# Patient Record
Sex: FEMALE | URBAN - METROPOLITAN AREA
[De-identification: names, ages, dates, MRNs, and addresses within clinical notes are randomized per-mention and may not be internally consistent; named-entity substitution may affect disease eponyms.]

---

## 2023-07-26 ENCOUNTER — HOSPITAL ENCOUNTER (OUTPATIENT)
Age: 78
Setting detail: SPECIMEN
Discharge: HOME | End: 2023-07-26

## 2023-07-26 LAB
ALBUMIN SERPL-MCNC: 4.1 G/DL (ref 3.5–5.2)
ALBUMIN/GLOB SERPL: 1.8 {RATIO} (ref 1–2.5)
ALP SERPL-CCNC: 55 U/L (ref 35–104)
ALT SERPL-CCNC: 15 U/L (ref 5–33)
ANION GAP SERPL CALCULATED.3IONS-SCNC: 14 MMOL/L (ref 9–17)
AST SERPL-CCNC: 17 U/L
BILIRUB SERPL-MCNC: 0.5 MG/DL (ref 0.3–1.2)
BUN SERPL-MCNC: 12 MG/DL (ref 8–23)
CALCIUM SERPL-MCNC: 9.7 MG/DL (ref 8.6–10.4)
CHLORIDE SERPL-SCNC: 104 MMOL/L (ref 98–107)
CO2 SERPL-SCNC: 24 MMOL/L (ref 20–31)
CREAT SERPL-MCNC: 0.5 MG/DL (ref 0.5–0.9)
EST. AVERAGE GLUCOSE BLD GHB EST-MCNC: 123 MG/DL
GFR SERPL CREATININE-BSD FRML MDRD: >60 ML/MIN/1.73M2
GLUCOSE SERPL-MCNC: 116 MG/DL (ref 70–99)
HBA1C MFR BLD: 5.9 % (ref 4–6)
POTASSIUM SERPL-SCNC: 4.2 MMOL/L (ref 3.7–5.3)
PROT SERPL-MCNC: 6.4 G/DL (ref 6.4–8.3)
SODIUM SERPL-SCNC: 142 MMOL/L (ref 135–144)
T4 FREE SERPL-MCNC: 1.3 NG/DL (ref 0.9–1.7)
TSH SERPL DL<=0.05 MIU/L-ACNC: 1.62 UIU/ML (ref 0.3–5)
VIT B12 SERPL-MCNC: 300 PG/ML (ref 232–1245)

## 2023-07-27 ENCOUNTER — HOSPITAL ENCOUNTER (OUTPATIENT)
Age: 78
Setting detail: SPECIMEN
Discharge: HOME | End: 2023-07-27

## 2023-07-27 LAB
CREAT UR-MCNC: 184 MG/DL (ref 28–217)
MICROALBUMIN UR-MCNC: 14 MG/L
MICROALBUMIN/CREAT UR-RTO: 8 MCG/MG CREAT

## 2023-09-05 RX ORDER — TRAZODONE HYDROCHLORIDE 100 MG/1
100 TABLET ORAL NIGHTLY
Qty: 30 TABLET | Refills: 5 | Status: SHIPPED | OUTPATIENT
Start: 2023-09-05 | End: 2024-02-02

## 2023-09-05 RX ORDER — TRAZODONE HYDROCHLORIDE 100 MG/1
TABLET ORAL
Qty: 90 TABLET | Refills: 1 | OUTPATIENT
Start: 2023-09-05

## 2023-09-05 RX ORDER — TRAZODONE HYDROCHLORIDE 100 MG/1
100 TABLET ORAL NIGHTLY
COMMUNITY
Start: 2020-09-28 | End: 2023-09-05 | Stop reason: SDUPTHER

## 2023-09-05 NOTE — TELEPHONE ENCOUNTER
Karlie Du is calling to request a refill on the following medication(s):    Medication Request:  Requested Prescriptions     Pending Prescriptions Disp Refills    traZODone (DESYREL) 100 MG tablet       Sig: Take 1 tablet by mouth nightly       Last Visit Date (If Applicable):  Visit date not found    Next Visit Date:    12/6/2023

## 2023-10-03 ENCOUNTER — TELEPHONE (OUTPATIENT)
Age: 78
End: 2023-10-03

## 2023-10-03 RX ORDER — FLUCONAZOLE 150 MG/1
TABLET ORAL
Qty: 3 TABLET | Refills: 2 | Status: SHIPPED | OUTPATIENT
Start: 2023-10-03

## 2023-11-30 ENCOUNTER — HOSPITAL ENCOUNTER (OUTPATIENT)
Age: 78
Setting detail: SPECIMEN
Discharge: HOME | End: 2023-11-30

## 2023-11-30 LAB
ALBUMIN SERPL-MCNC: 4 G/DL (ref 3.5–5.2)
ALBUMIN/GLOB SERPL: 1 {RATIO} (ref 1–2.5)
ALP SERPL-CCNC: 85 U/L (ref 35–104)
ALT SERPL-CCNC: 16 U/L (ref 10–35)
ANION GAP SERPL CALCULATED.3IONS-SCNC: 10 MMOL/L (ref 9–16)
AST SERPL-CCNC: 22 U/L (ref 10–35)
BASOPHILS # BLD: 0.09 K/UL (ref 0–0.2)
BASOPHILS NFR BLD: 1 % (ref 0–2)
BILIRUB SERPL-MCNC: 0.5 MG/DL (ref 0–1.2)
BUN SERPL-MCNC: 14 MG/DL (ref 8–23)
CALCIUM SERPL-MCNC: 9.4 MG/DL (ref 8.6–10.4)
CHLORIDE SERPL-SCNC: 104 MMOL/L (ref 98–107)
CHOLEST SERPL-MCNC: 166 MG/DL (ref 0–199)
CHOLESTEROL/HDL RATIO: 4
CO2 SERPL-SCNC: 28 MMOL/L (ref 20–31)
CREAT SERPL-MCNC: 0.7 MG/DL (ref 0.5–0.9)
EOSINOPHIL # BLD: 0.18 K/UL (ref 0–0.44)
EOSINOPHILS RELATIVE PERCENT: 3 % (ref 1–4)
ERYTHROCYTE [DISTWIDTH] IN BLOOD BY AUTOMATED COUNT: 14.3 % (ref 11.8–14.4)
EST. AVERAGE GLUCOSE BLD GHB EST-MCNC: 148 MG/DL
GFR SERPL CREATININE-BSD FRML MDRD: >60 ML/MIN/1.73M2
GLUCOSE SERPL-MCNC: 144 MG/DL (ref 74–99)
HBA1C MFR BLD: 6.8 % (ref 4–6)
HCT VFR BLD AUTO: 46.2 % (ref 36.3–47.1)
HDLC SERPL-MCNC: 47 MG/DL
HGB BLD-MCNC: 13.8 G/DL (ref 11.9–15.1)
IMM GRANULOCYTES # BLD AUTO: <0.03 K/UL (ref 0–0.3)
IMM GRANULOCYTES NFR BLD: 0 %
LDLC SERPL CALC-MCNC: 76 MG/DL (ref 0–100)
LYMPHOCYTES NFR BLD: 1.95 K/UL (ref 1.1–3.7)
LYMPHOCYTES RELATIVE PERCENT: 30 % (ref 24–43)
MCH RBC QN AUTO: 27.2 PG (ref 25.2–33.5)
MCHC RBC AUTO-ENTMCNC: 29.9 G/DL (ref 28.4–34.8)
MCV RBC AUTO: 90.9 FL (ref 82.6–102.9)
MONOCYTES NFR BLD: 0.56 K/UL (ref 0.1–1.2)
MONOCYTES NFR BLD: 9 % (ref 3–12)
NEUTROPHILS NFR BLD: 57 % (ref 36–65)
NEUTS SEG NFR BLD: 3.62 K/UL (ref 1.5–8.1)
NRBC BLD-RTO: 0 PER 100 WBC
PLATELET # BLD AUTO: 254 K/UL (ref 138–453)
PMV BLD AUTO: 10 FL (ref 8.1–13.5)
POTASSIUM SERPL-SCNC: 3.9 MMOL/L (ref 3.7–5.3)
PROT SERPL-MCNC: 6.7 G/DL (ref 6.6–8.7)
RBC # BLD AUTO: 5.08 M/UL (ref 3.95–5.11)
SODIUM SERPL-SCNC: 142 MMOL/L (ref 136–145)
TRIGL SERPL-MCNC: 216 MG/DL (ref 0–149)
VLDLC SERPL CALC-MCNC: 43 MG/DL
WBC OTHER # BLD: 6.4 K/UL (ref 3.5–11.3)

## 2023-11-30 NOTE — PROGRESS NOTES
[address] [address]     Date of Visit:  2023  Patient Name: Austin Beltran   Patient :  1945     CHIEF COMPLAINT/HPI:     Chief Complaint   Patient presents with    Check-Up    Discuss Medications        HPI      Austin Beltran, 69 y.o. presents today for 4 month follow-up of hypertension, hyperlipidemia, diabetes, hypothyroidism, and depression. She reports itching and raw feeling of vulva since starting Jardiance, so she has been taking Diflucan 150 mg. Her COPD has been well controlled with Breztri. She continues to experience shortness of breath when walking long distances, but she is no longer experiencing wheezing when she lays down in bed at night. She also reports mucus production in throat has resolved. She notes 3 month supply of Breztri is $500. She has been taking Omeprazole OTC with good control of acid reflux. She denies break through symptoms on medication. She requested prescription. She continues to experience knee pain, however she states it's not as bad as before. She reports experiencing back pain if she over does it such as running the sweeper. Pain improves with sitting down. She takes Meloxicam as needed for pain. She denies sinus headaches or pressure. She reports experiencing rhinorrhea and had been constantly sneezing and blowing her nose a couple weeks ago    She denies headaches, dizziness, syncope, chest pain, shortness of breath, nausea, vomiting, diarrhea, constipation, blood in her stool, lower extremity edema, vision changes, fever, or chills. She denies numbness and tingling in her feet. She denies family history of thyroid disease. She reports gaining weight easily and mentions gaining 5 lbs over . She follows with Dr. Kathi Arechiga for dermatology and recently had procedure on left lower extremity.      She is doing ok with grief, however she admits this time

## 2023-12-04 SDOH — ECONOMIC STABILITY: TRANSPORTATION INSECURITY
IN THE PAST 12 MONTHS, HAS LACK OF TRANSPORTATION KEPT YOU FROM MEETINGS, WORK, OR FROM GETTING THINGS NEEDED FOR DAILY LIVING?: NO

## 2023-12-04 SDOH — ECONOMIC STABILITY: INCOME INSECURITY: HOW HARD IS IT FOR YOU TO PAY FOR THE VERY BASICS LIKE FOOD, HOUSING, MEDICAL CARE, AND HEATING?: NOT HARD AT ALL

## 2023-12-04 SDOH — ECONOMIC STABILITY: HOUSING INSECURITY
IN THE LAST 12 MONTHS, WAS THERE A TIME WHEN YOU DID NOT HAVE A STEADY PLACE TO SLEEP OR SLEPT IN A SHELTER (INCLUDING NOW)?: NO

## 2023-12-04 SDOH — ECONOMIC STABILITY: FOOD INSECURITY: WITHIN THE PAST 12 MONTHS, THE FOOD YOU BOUGHT JUST DIDN'T LAST AND YOU DIDN'T HAVE MONEY TO GET MORE.: NEVER TRUE

## 2023-12-04 SDOH — ECONOMIC STABILITY: FOOD INSECURITY: WITHIN THE PAST 12 MONTHS, YOU WORRIED THAT YOUR FOOD WOULD RUN OUT BEFORE YOU GOT MONEY TO BUY MORE.: NEVER TRUE

## 2023-12-06 ENCOUNTER — OFFICE VISIT (OUTPATIENT)
Age: 78
End: 2023-12-06
Payer: MEDICARE

## 2023-12-06 VITALS
HEART RATE: 63 BPM | SYSTOLIC BLOOD PRESSURE: 120 MMHG | WEIGHT: 196 LBS | RESPIRATION RATE: 12 BRPM | BODY MASS INDEX: 32.65 KG/M2 | DIASTOLIC BLOOD PRESSURE: 88 MMHG | HEIGHT: 65 IN | OXYGEN SATURATION: 96 % | TEMPERATURE: 98.4 F

## 2023-12-06 DIAGNOSIS — Z00.00 MEDICARE ANNUAL WELLNESS VISIT, SUBSEQUENT: ICD-10-CM

## 2023-12-06 DIAGNOSIS — E78.1 HYPERTRIGLYCERIDEMIA: ICD-10-CM

## 2023-12-06 DIAGNOSIS — R42 VERTIGO: ICD-10-CM

## 2023-12-06 DIAGNOSIS — I38 MILD VALVULAR HEART DISEASE: ICD-10-CM

## 2023-12-06 DIAGNOSIS — Z12.31 SCREENING MAMMOGRAM FOR BREAST CANCER: ICD-10-CM

## 2023-12-06 DIAGNOSIS — G47.00 INSOMNIA, UNSPECIFIED TYPE: ICD-10-CM

## 2023-12-06 DIAGNOSIS — K57.90 DIVERTICULOSIS: Primary | ICD-10-CM

## 2023-12-06 DIAGNOSIS — R09.82 POST-NASAL DRIP: ICD-10-CM

## 2023-12-06 DIAGNOSIS — E03.9 HYPOTHYROIDISM, UNSPECIFIED TYPE: ICD-10-CM

## 2023-12-06 DIAGNOSIS — I10 ESSENTIAL HYPERTENSION: ICD-10-CM

## 2023-12-06 DIAGNOSIS — E78.2 MIXED HYPERLIPIDEMIA: ICD-10-CM

## 2023-12-06 DIAGNOSIS — Z12.11 SCREENING FOR COLON CANCER: ICD-10-CM

## 2023-12-06 DIAGNOSIS — K21.9 GASTROESOPHAGEAL REFLUX DISEASE WITHOUT ESOPHAGITIS: ICD-10-CM

## 2023-12-06 DIAGNOSIS — E11.8 TYPE II DIABETES MELLITUS WITH COMPLICATION (HCC): Primary | ICD-10-CM

## 2023-12-06 DIAGNOSIS — F33.0 MAJOR DEPRESSIVE DISORDER, RECURRENT, MILD (HCC): ICD-10-CM

## 2023-12-06 DIAGNOSIS — Z78.0 POST-MENOPAUSAL: ICD-10-CM

## 2023-12-06 DIAGNOSIS — J44.9 CHRONIC OBSTRUCTIVE PULMONARY DISEASE, UNSPECIFIED COPD TYPE (HCC): ICD-10-CM

## 2023-12-06 PROBLEM — E11.9 TYPE 2 DIABETES MELLITUS WITHOUT COMPLICATION (HCC): Status: ACTIVE | Noted: 2023-12-06

## 2023-12-06 PROBLEM — F33.1 MAJOR DEPRESSIVE DISORDER, RECURRENT, MODERATE (HCC): Status: ACTIVE | Noted: 2023-12-06

## 2023-12-06 PROBLEM — M19.90 DJD (DEGENERATIVE JOINT DISEASE): Status: ACTIVE | Noted: 2023-12-06

## 2023-12-06 PROBLEM — F33.9 MAJOR DEPRESSIVE DISORDER, RECURRENT, UNSPECIFIED (HCC): Status: ACTIVE | Noted: 2023-12-06

## 2023-12-06 PROBLEM — G47.30 SLEEP APNEA: Status: ACTIVE | Noted: 2023-12-06

## 2023-12-06 PROCEDURE — G0439 PPPS, SUBSEQ VISIT: HCPCS | Performed by: FAMILY MEDICINE

## 2023-12-06 PROCEDURE — G8417 CALC BMI ABV UP PARAM F/U: HCPCS | Performed by: FAMILY MEDICINE

## 2023-12-06 PROCEDURE — G8484 FLU IMMUNIZE NO ADMIN: HCPCS | Performed by: FAMILY MEDICINE

## 2023-12-06 PROCEDURE — 1123F ACP DISCUSS/DSCN MKR DOCD: CPT | Performed by: FAMILY MEDICINE

## 2023-12-06 PROCEDURE — 1036F TOBACCO NON-USER: CPT | Performed by: FAMILY MEDICINE

## 2023-12-06 PROCEDURE — 3079F DIAST BP 80-89 MM HG: CPT | Performed by: FAMILY MEDICINE

## 2023-12-06 PROCEDURE — 3023F SPIROM DOC REV: CPT | Performed by: FAMILY MEDICINE

## 2023-12-06 PROCEDURE — 3044F HG A1C LEVEL LT 7.0%: CPT | Performed by: FAMILY MEDICINE

## 2023-12-06 PROCEDURE — 99214 OFFICE O/P EST MOD 30 MIN: CPT | Performed by: FAMILY MEDICINE

## 2023-12-06 PROCEDURE — G8400 PT W/DXA NO RESULTS DOC: HCPCS | Performed by: FAMILY MEDICINE

## 2023-12-06 PROCEDURE — G8427 DOCREV CUR MEDS BY ELIG CLIN: HCPCS | Performed by: FAMILY MEDICINE

## 2023-12-06 PROCEDURE — 1090F PRES/ABSN URINE INCON ASSESS: CPT | Performed by: FAMILY MEDICINE

## 2023-12-06 PROCEDURE — 3074F SYST BP LT 130 MM HG: CPT | Performed by: FAMILY MEDICINE

## 2023-12-06 RX ORDER — CITALOPRAM 20 MG/1
20 TABLET ORAL DAILY
COMMUNITY

## 2023-12-06 RX ORDER — ASPIRIN 81 MG/1
81 TABLET ORAL
COMMUNITY
Start: 2023-01-09 | End: 2023-12-06

## 2023-12-06 RX ORDER — CANDESARTAN 32 MG/1
32 TABLET ORAL DAILY
COMMUNITY
Start: 2020-09-28

## 2023-12-06 RX ORDER — OMEPRAZOLE 20 MG/1
20 CAPSULE, DELAYED RELEASE ORAL DAILY
COMMUNITY
End: 2023-12-06 | Stop reason: SDUPTHER

## 2023-12-06 RX ORDER — OMEPRAZOLE 20 MG/1
20 CAPSULE, DELAYED RELEASE ORAL DAILY
Qty: 90 CAPSULE | Refills: 3 | Status: SHIPPED | OUTPATIENT
Start: 2023-12-06 | End: 2024-11-30

## 2023-12-06 RX ORDER — UBIDECARENONE 200 MG
200 CAPSULE ORAL DAILY
COMMUNITY

## 2023-12-06 RX ORDER — CARVEDILOL 6.25 MG/1
6.25 TABLET ORAL 2 TIMES DAILY WITH MEALS
COMMUNITY
Start: 2020-09-28

## 2023-12-06 RX ORDER — LEVOTHYROXINE SODIUM 112 UG/1
112 TABLET ORAL
COMMUNITY
Start: 2020-09-28

## 2023-12-06 RX ORDER — ROSUVASTATIN CALCIUM 20 MG/1
20 TABLET, COATED ORAL DAILY
COMMUNITY

## 2023-12-06 RX ORDER — EMPAGLIFLOZIN 10 MG/1
TABLET, FILM COATED ORAL
COMMUNITY
Start: 2023-10-22 | End: 2023-12-06

## 2023-12-06 RX ORDER — SEMAGLUTIDE 1.34 MG/ML
0.25 INJECTION, SOLUTION SUBCUTANEOUS WEEKLY
Qty: 1 ADJUSTABLE DOSE PRE-FILLED PEN SYRINGE | Refills: 0 | Status: SHIPPED | OUTPATIENT
Start: 2023-12-06

## 2023-12-06 RX ORDER — MELOXICAM 15 MG/1
15 TABLET ORAL DAILY
COMMUNITY
Start: 2020-09-28

## 2023-12-06 RX ORDER — BUDESONIDE, GLYCOPYRROLATE, AND FORMOTEROL FUMARATE 160; 9; 4.8 UG/1; UG/1; UG/1
AEROSOL, METERED RESPIRATORY (INHALATION)
COMMUNITY

## 2023-12-06 RX ORDER — VALSARTAN 320 MG/1
320 TABLET ORAL DAILY
COMMUNITY
End: 2023-12-06

## 2023-12-06 RX ORDER — AMLODIPINE BESYLATE 5 MG/1
5 TABLET ORAL DAILY
COMMUNITY
Start: 2020-09-28

## 2023-12-06 ASSESSMENT — PATIENT HEALTH QUESTIONNAIRE - PHQ9
2. FEELING DOWN, DEPRESSED OR HOPELESS: 0
SUM OF ALL RESPONSES TO PHQ QUESTIONS 1-9: 0
1. LITTLE INTEREST OR PLEASURE IN DOING THINGS: 0
SUM OF ALL RESPONSES TO PHQ QUESTIONS 1-9: 0
SUM OF ALL RESPONSES TO PHQ9 QUESTIONS 1 & 2: 0

## 2023-12-15 ENCOUNTER — HOSPITAL ENCOUNTER (OUTPATIENT)
Dept: MAMMOGRAPHY | Age: 78
End: 2023-12-15
Payer: MEDICARE

## 2023-12-15 ENCOUNTER — ANCILLARY ORDERS (OUTPATIENT)
Age: 78
End: 2023-12-15

## 2023-12-15 DIAGNOSIS — F33.0 MAJOR DEPRESSIVE DISORDER, RECURRENT, MILD (HCC): ICD-10-CM

## 2023-12-15 DIAGNOSIS — K21.9 GASTROESOPHAGEAL REFLUX DISEASE WITHOUT ESOPHAGITIS: ICD-10-CM

## 2023-12-15 DIAGNOSIS — E03.9 HYPOTHYROIDISM, UNSPECIFIED TYPE: ICD-10-CM

## 2023-12-15 DIAGNOSIS — R09.82 POST-NASAL DRIP: ICD-10-CM

## 2023-12-15 DIAGNOSIS — G47.00 INSOMNIA, UNSPECIFIED TYPE: ICD-10-CM

## 2023-12-15 DIAGNOSIS — I10 ESSENTIAL HYPERTENSION: ICD-10-CM

## 2023-12-15 DIAGNOSIS — E11.8 TYPE II DIABETES MELLITUS WITH COMPLICATION (HCC): Primary | ICD-10-CM

## 2023-12-15 DIAGNOSIS — Z00.00 MEDICARE ANNUAL WELLNESS VISIT, SUBSEQUENT: ICD-10-CM

## 2023-12-15 DIAGNOSIS — Z78.0 POST-MENOPAUSAL: ICD-10-CM

## 2023-12-15 DIAGNOSIS — Z12.11 SCREENING FOR COLON CANCER: ICD-10-CM

## 2023-12-15 DIAGNOSIS — Z12.31 SCREENING MAMMOGRAM FOR BREAST CANCER: ICD-10-CM

## 2023-12-15 DIAGNOSIS — E78.2 MIXED HYPERLIPIDEMIA: ICD-10-CM

## 2023-12-15 DIAGNOSIS — J44.9 CHRONIC OBSTRUCTIVE PULMONARY DISEASE, UNSPECIFIED COPD TYPE (HCC): ICD-10-CM

## 2023-12-15 PROCEDURE — 77063 BREAST TOMOSYNTHESIS BI: CPT

## 2024-01-03 ENCOUNTER — TELEPHONE (OUTPATIENT)
Age: 79
End: 2024-01-03

## 2024-01-03 RX ORDER — SEMAGLUTIDE 1.34 MG/ML
0.5 INJECTION, SOLUTION SUBCUTANEOUS WEEKLY
Qty: 1 ADJUSTABLE DOSE PRE-FILLED PEN SYRINGE | Refills: 1 | Status: SHIPPED | OUTPATIENT
Start: 2024-01-03

## 2024-01-03 NOTE — TELEPHONE ENCOUNTER
Clary was wondering if she could get a phone call about her Faizan results she had done on 12/27/23 in the [facility].    Thank you.

## 2024-01-03 NOTE — TELEPHONE ENCOUNTER
Hypertriglyceridemia     Hypothyroidism     Mild valvular heart disease     Sleep apnea     Type 2 diabetes mellitus without complication (HCC)     Vertigo     Major depressive disorder, recurrent, mild     Major depressive disorder, recurrent, moderate     Major depressive disorder, recurrent, unspecified

## 2024-01-15 ENCOUNTER — TELEPHONE (OUTPATIENT)
Age: 79
End: 2024-01-15

## 2024-01-15 NOTE — TELEPHONE ENCOUNTER
Patient wondering the status of the prior auth for her Ozempic    States she has been waiting on this for a couple weeks and now is due for her  next shot this week Thursday    Needs it to be sent to [facility]    She looked up the progress of the prior auth and states that they are waiting on Dr BOUCHER    Patient is aware that Dr BOUCHER not in office until this week Thursday and rounding on Wednesday    Patient asking for an update

## 2024-01-17 ENCOUNTER — TELEPHONE (OUTPATIENT)
Age: 79
End: 2024-01-17

## 2024-01-17 RX ORDER — SEMAGLUTIDE 1.34 MG/ML
0.5 INJECTION, SOLUTION SUBCUTANEOUS WEEKLY
Qty: 1 ADJUSTABLE DOSE PRE-FILLED PEN SYRINGE | Refills: 1 | Status: SHIPPED | OUTPATIENT
Start: 2024-01-17

## 2024-01-17 NOTE — TELEPHONE ENCOUNTER
Spoke to patient and she talked with mail order and they did not get the PA done in time.      Patient needs the PAPER COPY so she can take it to [facility] herself.     Made sure the script was sent to [facility]

## 2024-01-29 RX ORDER — SEMAGLUTIDE 0.68 MG/ML
INJECTION, SOLUTION SUBCUTANEOUS
Qty: 3 ML | Refills: 3 | Status: SHIPPED | OUTPATIENT
Start: 2024-01-29

## 2024-01-29 NOTE — TELEPHONE ENCOUNTER
Clary Harris is calling to request a refill on the following medication(s):    Medication Request:  Requested Prescriptions     Pending Prescriptions Disp Refills    OZEMPIC, 0.25 OR 0.5 MG/DOSE, 2 MG/3ML SOPN [Pharmacy Med Name: OZEMPIC 0.25-0.5 MG/DOSE(2 MG/3 ML)] 3 mL      Sig: INJECT 0.25MG UNDER THE SKIN ONCE A WEEK       Last Visit Date (If Applicable):  12/6/2023    Next Visit Date:    6/10/2024

## 2024-02-27 RX ORDER — SEMAGLUTIDE 0.68 MG/ML
INJECTION, SOLUTION SUBCUTANEOUS
Qty: 3 ML | Refills: 12 | Status: SHIPPED | OUTPATIENT
Start: 2024-02-27

## 2024-02-27 NOTE — TELEPHONE ENCOUNTER
Clary Harris is calling to request a refill on the following medication(s):    Medication Request:  Requested Prescriptions     Pending Prescriptions Disp Refills    OZEMPIC, 0.25 OR 0.5 MG/DOSE, 2 MG/3ML SOPN [Pharmacy Med Name: OZEMPIC PEN (0.25/0.5MG) 3ML 2MG/3ML] 3 mL 12     Sig: INJECT 0.5 MG UNDER THE SKIN ONCE A WEEK       Last Visit Date (If Applicable):  12/6/2023    Next Visit Date:    6/10/2024

## 2024-04-16 DIAGNOSIS — K21.9 GASTROESOPHAGEAL REFLUX DISEASE WITHOUT ESOPHAGITIS: ICD-10-CM

## 2024-04-16 RX ORDER — OMEPRAZOLE 20 MG/1
20 CAPSULE, DELAYED RELEASE ORAL DAILY
Qty: 90 CAPSULE | Refills: 2 | Status: SHIPPED | OUTPATIENT
Start: 2024-04-16

## 2024-04-16 NOTE — TELEPHONE ENCOUNTER
Clary Harris is calling to request a refill on the following medication(s):    Medication Request:  Requested Prescriptions     Pending Prescriptions Disp Refills    omeprazole (PRILOSEC) 20 MG delayed release capsule [Pharmacy Med Name: OMEPRAZOLE DR CAPS 20MG] 90 capsule 0     Sig: Take 1 capsule by mouth Daily       Last Visit Date (If Applicable):  12/6/2023    Next Visit Date:    6/10/2024

## 2024-06-04 ENCOUNTER — HOSPITAL ENCOUNTER (OUTPATIENT)
Age: 79
Setting detail: SPECIMEN
Discharge: HOME | End: 2024-06-04

## 2024-06-04 DIAGNOSIS — E03.9 HYPOTHYROIDISM, UNSPECIFIED TYPE: ICD-10-CM

## 2024-06-04 DIAGNOSIS — E78.2 MIXED HYPERLIPIDEMIA: ICD-10-CM

## 2024-06-04 DIAGNOSIS — E11.8 TYPE II DIABETES MELLITUS WITH COMPLICATION (HCC): ICD-10-CM

## 2024-06-04 LAB
ALBUMIN SERPL-MCNC: 4.3 G/DL (ref 3.5–5.2)
ALBUMIN/GLOB SERPL: 2 {RATIO} (ref 1–2.5)
ALP SERPL-CCNC: 84 U/L (ref 35–104)
ALT SERPL-CCNC: 18 U/L (ref 10–35)
ANION GAP SERPL CALCULATED.3IONS-SCNC: 9 MMOL/L (ref 9–16)
AST SERPL-CCNC: 26 U/L (ref 10–35)
BILIRUB SERPL-MCNC: 0.7 MG/DL (ref 0–1.2)
BUN SERPL-MCNC: 17 MG/DL (ref 8–23)
CALCIUM SERPL-MCNC: 9.7 MG/DL (ref 8.6–10.4)
CHLORIDE SERPL-SCNC: 105 MMOL/L (ref 98–107)
CHOLEST SERPL-MCNC: 134 MG/DL (ref 0–199)
CHOLESTEROL/HDL RATIO: 3
CO2 SERPL-SCNC: 30 MMOL/L (ref 20–31)
CREAT SERPL-MCNC: 0.9 MG/DL (ref 0.5–0.9)
EST. AVERAGE GLUCOSE BLD GHB EST-MCNC: 123 MG/DL
GFR, ESTIMATED: 71 ML/MIN/1.73M2
GLUCOSE SERPL-MCNC: 97 MG/DL (ref 74–99)
HBA1C MFR BLD: 5.9 % (ref 4–6)
HDLC SERPL-MCNC: 50 MG/DL
LDLC SERPL CALC-MCNC: 55 MG/DL (ref 0–100)
POTASSIUM SERPL-SCNC: 4.3 MMOL/L (ref 3.7–5.3)
PROT SERPL-MCNC: 6.7 G/DL (ref 6.6–8.7)
SODIUM SERPL-SCNC: 144 MMOL/L (ref 136–145)
T4 FREE SERPL-MCNC: 1.1 NG/DL (ref 0.92–1.68)
TRIGL SERPL-MCNC: 143 MG/DL
TSH SERPL DL<=0.05 MIU/L-ACNC: 1.63 UIU/ML (ref 0.27–4.2)
VIT B12 SERPL-MCNC: 1351 PG/ML (ref 232–1245)
VLDLC SERPL CALC-MCNC: 29 MG/DL

## 2024-06-06 ENCOUNTER — HOSPITAL ENCOUNTER (OUTPATIENT)
Age: 79
Setting detail: SPECIMEN
Discharge: HOME | End: 2024-06-06

## 2024-06-06 DIAGNOSIS — Z12.11 SCREENING FOR COLON CANCER: ICD-10-CM

## 2024-06-06 DIAGNOSIS — E11.8 TYPE II DIABETES MELLITUS WITH COMPLICATION (HCC): ICD-10-CM

## 2024-06-06 LAB
CREAT UR-MCNC: 20.3 MG/DL (ref 28–217)
MICROALBUMIN UR-MCNC: <12 MG/L (ref 0–20)
MICROALBUMIN/CREAT UR-RTO: ABNORMAL MCG/MG CREAT (ref 0–25)

## 2024-06-10 ENCOUNTER — OFFICE VISIT (OUTPATIENT)
Age: 79
End: 2024-06-10
Payer: MEDICARE

## 2024-06-10 VITALS
BODY MASS INDEX: 32.18 KG/M2 | DIASTOLIC BLOOD PRESSURE: 78 MMHG | RESPIRATION RATE: 14 BRPM | WEIGHT: 193.4 LBS | HEART RATE: 70 BPM | SYSTOLIC BLOOD PRESSURE: 120 MMHG | OXYGEN SATURATION: 97 % | TEMPERATURE: 97.9 F

## 2024-06-10 DIAGNOSIS — F33.0 MAJOR DEPRESSIVE DISORDER, RECURRENT, MILD (HCC): ICD-10-CM

## 2024-06-10 DIAGNOSIS — I10 ESSENTIAL HYPERTENSION: ICD-10-CM

## 2024-06-10 DIAGNOSIS — M15.9 PRIMARY OSTEOARTHRITIS INVOLVING MULTIPLE JOINTS: ICD-10-CM

## 2024-06-10 DIAGNOSIS — E78.2 MIXED HYPERLIPIDEMIA: ICD-10-CM

## 2024-06-10 DIAGNOSIS — E03.9 HYPOTHYROIDISM, UNSPECIFIED TYPE: Primary | ICD-10-CM

## 2024-06-10 DIAGNOSIS — K21.9 GASTROESOPHAGEAL REFLUX DISEASE WITHOUT ESOPHAGITIS: ICD-10-CM

## 2024-06-10 DIAGNOSIS — J44.9 CHRONIC OBSTRUCTIVE PULMONARY DISEASE, UNSPECIFIED COPD TYPE (HCC): ICD-10-CM

## 2024-06-10 DIAGNOSIS — E11.8 TYPE II DIABETES MELLITUS WITH COMPLICATION (HCC): ICD-10-CM

## 2024-06-10 PROCEDURE — 90677 PCV20 VACCINE IM: CPT | Performed by: FAMILY MEDICINE

## 2024-06-10 PROCEDURE — G8427 DOCREV CUR MEDS BY ELIG CLIN: HCPCS | Performed by: FAMILY MEDICINE

## 2024-06-10 PROCEDURE — 1090F PRES/ABSN URINE INCON ASSESS: CPT | Performed by: FAMILY MEDICINE

## 2024-06-10 PROCEDURE — 3023F SPIROM DOC REV: CPT | Performed by: FAMILY MEDICINE

## 2024-06-10 PROCEDURE — 99214 OFFICE O/P EST MOD 30 MIN: CPT | Performed by: FAMILY MEDICINE

## 2024-06-10 PROCEDURE — G8400 PT W/DXA NO RESULTS DOC: HCPCS | Performed by: FAMILY MEDICINE

## 2024-06-10 PROCEDURE — G0009 ADMIN PNEUMOCOCCAL VACCINE: HCPCS | Performed by: FAMILY MEDICINE

## 2024-06-10 PROCEDURE — 1123F ACP DISCUSS/DSCN MKR DOCD: CPT | Performed by: FAMILY MEDICINE

## 2024-06-10 PROCEDURE — 3078F DIAST BP <80 MM HG: CPT | Performed by: FAMILY MEDICINE

## 2024-06-10 PROCEDURE — 3044F HG A1C LEVEL LT 7.0%: CPT | Performed by: FAMILY MEDICINE

## 2024-06-10 PROCEDURE — G8417 CALC BMI ABV UP PARAM F/U: HCPCS | Performed by: FAMILY MEDICINE

## 2024-06-10 PROCEDURE — 3074F SYST BP LT 130 MM HG: CPT | Performed by: FAMILY MEDICINE

## 2024-06-10 PROCEDURE — 1036F TOBACCO NON-USER: CPT | Performed by: FAMILY MEDICINE

## 2024-06-10 RX ORDER — CARVEDILOL 6.25 MG/1
6.25 TABLET ORAL 2 TIMES DAILY WITH MEALS
Qty: 180 TABLET | Refills: 3 | Status: SHIPPED | OUTPATIENT
Start: 2024-06-10

## 2024-06-10 RX ORDER — BUDESONIDE, GLYCOPYRROLATE, AND FORMOTEROL FUMARATE 160; 9; 4.8 UG/1; UG/1; UG/1
2 AEROSOL, METERED RESPIRATORY (INHALATION) 2 TIMES DAILY
Qty: 3 EACH | Refills: 3 | Status: SHIPPED | OUTPATIENT
Start: 2024-06-10

## 2024-06-10 RX ORDER — CITALOPRAM 20 MG/1
20 TABLET ORAL DAILY
Qty: 90 TABLET | Refills: 3 | Status: SHIPPED | OUTPATIENT
Start: 2024-06-10

## 2024-06-10 RX ORDER — CANDESARTAN 32 MG/1
32 TABLET ORAL DAILY
Qty: 90 TABLET | Refills: 3 | Status: SHIPPED | OUTPATIENT
Start: 2024-06-10

## 2024-06-10 RX ORDER — ROSUVASTATIN CALCIUM 20 MG/1
20 TABLET, COATED ORAL DAILY
Qty: 90 TABLET | Refills: 3 | Status: SHIPPED | OUTPATIENT
Start: 2024-06-10

## 2024-06-10 RX ORDER — LEVOTHYROXINE SODIUM 112 UG/1
112 TABLET ORAL DAILY
Qty: 90 TABLET | Refills: 3 | Status: SHIPPED | OUTPATIENT
Start: 2024-06-10

## 2024-06-10 RX ORDER — MELOXICAM 15 MG/1
15 TABLET ORAL DAILY
Qty: 90 TABLET | Refills: 3 | Status: SHIPPED | OUTPATIENT
Start: 2024-06-10

## 2024-06-10 RX ORDER — SEMAGLUTIDE 1.34 MG/ML
1 INJECTION, SOLUTION SUBCUTANEOUS
Qty: 3 ADJUSTABLE DOSE PRE-FILLED PEN SYRINGE | Refills: 2 | Status: SHIPPED | OUTPATIENT
Start: 2024-06-10 | End: 2024-09-08

## 2024-06-10 RX ORDER — TRAZODONE HYDROCHLORIDE 100 MG/1
100 TABLET ORAL NIGHTLY
Qty: 90 TABLET | Refills: 3 | Status: SHIPPED | OUTPATIENT
Start: 2024-06-10 | End: 2025-06-05

## 2024-06-10 RX ORDER — AMLODIPINE BESYLATE 5 MG/1
5 TABLET ORAL DAILY
Qty: 90 TABLET | Refills: 3 | Status: SHIPPED | OUTPATIENT
Start: 2024-06-10

## 2024-06-10 ASSESSMENT — PATIENT HEALTH QUESTIONNAIRE - PHQ9
SUM OF ALL RESPONSES TO PHQ QUESTIONS 1-9: 0
10. IF YOU CHECKED OFF ANY PROBLEMS, HOW DIFFICULT HAVE THESE PROBLEMS MADE IT FOR YOU TO DO YOUR WORK, TAKE CARE OF THINGS AT HOME, OR GET ALONG WITH OTHER PEOPLE: NOT DIFFICULT AT ALL
6. FEELING BAD ABOUT YOURSELF - OR THAT YOU ARE A FAILURE OR HAVE LET YOURSELF OR YOUR FAMILY DOWN: NOT AT ALL
9. THOUGHTS THAT YOU WOULD BE BETTER OFF DEAD, OR OF HURTING YOURSELF: NOT AT ALL
SUM OF ALL RESPONSES TO PHQ QUESTIONS 1-9: 0
7. TROUBLE CONCENTRATING ON THINGS, SUCH AS READING THE NEWSPAPER OR WATCHING TELEVISION: NOT AT ALL
2. FEELING DOWN, DEPRESSED OR HOPELESS: NOT AT ALL
SUM OF ALL RESPONSES TO PHQ QUESTIONS 1-9: 0
3. TROUBLE FALLING OR STAYING ASLEEP: NOT AT ALL
5. POOR APPETITE OR OVEREATING: NOT AT ALL
4. FEELING TIRED OR HAVING LITTLE ENERGY: NOT AT ALL
SUM OF ALL RESPONSES TO PHQ QUESTIONS 1-9: 0
1. LITTLE INTEREST OR PLEASURE IN DOING THINGS: NOT AT ALL
SUM OF ALL RESPONSES TO PHQ9 QUESTIONS 1 & 2: 0
8. MOVING OR SPEAKING SO SLOWLY THAT OTHER PEOPLE COULD HAVE NOTICED. OR THE OPPOSITE, BEING SO FIGETY OR RESTLESS THAT YOU HAVE BEEN MOVING AROUND A LOT MORE THAN USUAL: NOT AT ALL

## 2024-06-10 NOTE — PATIENT INSTRUCTIONS
Clary    Thank you for choosing [facility].  We know you have options when it comes to your healthcare; we appreciate that you chose us. Our goal is to provide exceptional  service and world class care to every patient.  You will be receiving a survey via email or text message asking for your feedback.  Please take a few minutes to share your thoughts about your recent visit. Your comments help us understand what we do well and ways we can improve.  Thank you in advance for your valuable feedback.      Dr. Donna Villafuerte MA

## 2024-06-10 NOTE — PROGRESS NOTES
[facility]  [facility] MEDICINE  [address]  [place] [address]     Date of Visit:  6/10/2024  Patient Name: Clary Harris   Patient :  1945     CHIEF COMPLAINT/HPI:     Chief Complaint   Patient presents with    Diabetes     Patient is  here for  6 month check up  on  Diabetes  and  labs done         HPI      Clary Harris, 78 y.o. presents today for follow-up of hypothyroidism, COPD, depression, diabetes, hypertension, hyperlipidemia, GERD, and osteoarthritis.     She was taken off of Jardiance due to yeast infections and started Ozempic. She is currently using 0.5 mg which she reports tolerating well without side effects. She initially noticed appetite suppression, however she is no longer experiencing that and would like to increase her dosage as she is hoping to lose more weight. She has lost 3 lbs since her last visit. She reports improvement in aching since stopping Metformin and starting Ozempic.     She has been taking Meloxicam daily but is willing to wean to every other day due to recent improvement in aching.     Her wheezing has improved and she reports benefit from routine use of Breztri 160-9-4.8 mcg.     Denies headaches, dizziness, syncope, chest pain, shortness of breath, nausea, vomiting, diarrhea, constipation, blood in the stool, edema, skin changes, vision changes, fever, or chills. Denies numbness or tingling in her feet.     She follows with dermatology and the eye doctor routinely.     She's been doing well with grieving and has lots of family support.     REVIEW OF SYSTEM      Review of Systems:   Constitutional:  Negative for chills, fatigue, fever and unexpected weight change.   Eyes:  Negative for visual disturbance.   Respiratory:  Negative for cough, chest tightness, shortness of breath and wheezing.    Cardiovascular:  Negative for chest pain, palpitations and leg swelling.   Gastrointestinal:  Negative for abdominal distention,

## 2024-06-10 NOTE — PROGRESS NOTES
After obtaining consent, and per orders of Dr. Thompson, injection of Prenvar 20 given in Left deltoid by Josie Villafuerte MA. Patient tolerated the injection well.

## 2024-10-30 RX ORDER — ROSUVASTATIN CALCIUM 5 MG/1
5 TABLET, COATED ORAL DAILY
Qty: 90 TABLET | Refills: 3 | Status: SHIPPED | OUTPATIENT
Start: 2024-10-30 | End: 2024-11-02 | Stop reason: ALTCHOICE

## 2024-11-02 ENCOUNTER — APPOINTMENT (OUTPATIENT)
Dept: GENERAL RADIOLOGY | Age: 79
DRG: 193 | End: 2024-11-02
Payer: MEDICARE

## 2024-11-02 ENCOUNTER — HOSPITAL ENCOUNTER (INPATIENT)
Age: 79
LOS: 20 days | Discharge: HOME HEALTH | DRG: 193 | End: 2024-11-22
Attending: EMERGENCY MEDICINE | Admitting: FAMILY MEDICINE
Payer: MEDICARE

## 2024-11-02 DIAGNOSIS — R07.9 CHEST PAIN, UNSPECIFIED TYPE: Primary | ICD-10-CM

## 2024-11-02 DIAGNOSIS — R06.00 DYSPNEA, UNSPECIFIED TYPE: ICD-10-CM

## 2024-11-02 DIAGNOSIS — J18.9 PNEUMONIA OF BOTH LOWER LOBES DUE TO INFECTIOUS ORGANISM: ICD-10-CM

## 2024-11-02 DIAGNOSIS — J44.9 CHRONIC OBSTRUCTIVE PULMONARY DISEASE, UNSPECIFIED COPD TYPE (HCC): ICD-10-CM

## 2024-11-02 DIAGNOSIS — E03.9 HYPOTHYROIDISM, UNSPECIFIED TYPE: ICD-10-CM

## 2024-11-02 DIAGNOSIS — R09.02 HYPOXIA: ICD-10-CM

## 2024-11-02 DIAGNOSIS — I10 PRIMARY HYPERTENSION: ICD-10-CM

## 2024-11-02 LAB
ALBUMIN SERPL-MCNC: 4 G/DL (ref 3.5–5.2)
ALBUMIN/GLOB SERPL: 1.1 {RATIO} (ref 1–2.5)
ALP SERPL-CCNC: 84 U/L (ref 35–104)
ALT SERPL-CCNC: 12 U/L (ref 5–33)
ANION GAP SERPL CALCULATED.3IONS-SCNC: 9 MMOL/L (ref 9–17)
AST SERPL-CCNC: 14 U/L
BASOPHILS # BLD: 0 K/UL (ref 0–0.2)
BASOPHILS NFR BLD: 0 % (ref 0–2)
BILIRUB SERPL-MCNC: 0.6 MG/DL (ref 0.3–1.2)
BNP SERPL-MCNC: 90 PG/ML
BUN SERPL-MCNC: 27 MG/DL (ref 8–23)
CALCIUM SERPL-MCNC: 9.2 MG/DL (ref 8.6–10.4)
CHLORIDE SERPL-SCNC: 100 MMOL/L (ref 98–107)
CO2 SERPL-SCNC: 27 MMOL/L (ref 20–31)
CREAT SERPL-MCNC: 0.9 MG/DL (ref 0.5–0.9)
D DIMER PPP FEU-MCNC: 1.72 UG/ML FEU
EOSINOPHIL # BLD: 0 K/UL (ref 0–0.4)
EOSINOPHILS RELATIVE PERCENT: 0 % (ref 1–4)
ERYTHROCYTE [DISTWIDTH] IN BLOOD BY AUTOMATED COUNT: 14.5 % (ref 12.5–15.4)
GFR, ESTIMATED: 66 ML/MIN/1.73M2
GLUCOSE SERPL-MCNC: 242 MG/DL (ref 70–99)
HCT VFR BLD AUTO: 40.6 % (ref 36–46)
HGB BLD-MCNC: 13.4 G/DL (ref 12–16)
INR PPP: 0.9
LACTATE BLDV-SCNC: 1.7 MMOL/L (ref 0.5–2.2)
LYMPHOCYTES NFR BLD: 1.2 K/UL (ref 1–4.8)
LYMPHOCYTES RELATIVE PERCENT: 7 % (ref 24–44)
MCH RBC QN AUTO: 28.5 PG (ref 26–34)
MCHC RBC AUTO-ENTMCNC: 32.9 G/DL (ref 31–37)
MCV RBC AUTO: 86.7 FL (ref 80–100)
MONOCYTES NFR BLD: 1.2 K/UL (ref 0.1–1.2)
MONOCYTES NFR BLD: 8 % (ref 2–11)
NEUTROPHILS NFR BLD: 85 % (ref 36–66)
NEUTS SEG NFR BLD: 14.1 K/UL (ref 1.8–7.7)
PARTIAL THROMBOPLASTIN TIME: 25.9 SEC (ref 21.3–31.3)
PLATELET # BLD AUTO: 342 K/UL (ref 140–450)
PMV BLD AUTO: 7.2 FL (ref 6–12)
POTASSIUM SERPL-SCNC: 4 MMOL/L (ref 3.7–5.3)
PROT SERPL-MCNC: 7.6 G/DL (ref 6.4–8.3)
PROTHROMBIN TIME: 9.9 SEC (ref 9.4–12.6)
RBC # BLD AUTO: 4.68 M/UL (ref 4–5.2)
SARS-COV-2 RDRP RESP QL NAA+PROBE: NOT DETECTED
SODIUM SERPL-SCNC: 136 MMOL/L (ref 135–144)
SPECIMEN DESCRIPTION: NORMAL
TROPONIN I SERPL HS-MCNC: <6 NG/L (ref 0–14)
WBC OTHER # BLD: 16.6 K/UL (ref 3.5–11)

## 2024-11-02 PROCEDURE — 83880 ASSAY OF NATRIURETIC PEPTIDE: CPT

## 2024-11-02 PROCEDURE — 6360000002 HC RX W HCPCS: Performed by: FAMILY MEDICINE

## 2024-11-02 PROCEDURE — 87635 SARS-COV-2 COVID-19 AMP PRB: CPT

## 2024-11-02 PROCEDURE — 93005 ELECTROCARDIOGRAM TRACING: CPT | Performed by: EMERGENCY MEDICINE

## 2024-11-02 PROCEDURE — 83605 ASSAY OF LACTIC ACID: CPT

## 2024-11-02 PROCEDURE — 85610 PROTHROMBIN TIME: CPT

## 2024-11-02 PROCEDURE — 85379 FIBRIN DEGRADATION QUANT: CPT

## 2024-11-02 PROCEDURE — 99285 EMERGENCY DEPT VISIT HI MDM: CPT

## 2024-11-02 PROCEDURE — 80053 COMPREHEN METABOLIC PANEL: CPT

## 2024-11-02 PROCEDURE — 36415 COLL VENOUS BLD VENIPUNCTURE: CPT

## 2024-11-02 PROCEDURE — 87040 BLOOD CULTURE FOR BACTERIA: CPT

## 2024-11-02 PROCEDURE — 84484 ASSAY OF TROPONIN QUANT: CPT

## 2024-11-02 PROCEDURE — 71045 X-RAY EXAM CHEST 1 VIEW: CPT

## 2024-11-02 PROCEDURE — 85730 THROMBOPLASTIN TIME PARTIAL: CPT

## 2024-11-02 PROCEDURE — 1200000000 HC SEMI PRIVATE

## 2024-11-02 PROCEDURE — 85025 COMPLETE CBC W/AUTO DIFF WBC: CPT

## 2024-11-02 RX ORDER — POLYETHYLENE GLYCOL 3350 17 G/17G
17 POWDER, FOR SOLUTION ORAL DAILY PRN
Status: DISCONTINUED | OUTPATIENT
Start: 2024-11-02 | End: 2024-11-22 | Stop reason: HOSPADM

## 2024-11-02 RX ORDER — SODIUM CHLORIDE 0.9 % (FLUSH) 0.9 %
5-40 SYRINGE (ML) INJECTION PRN
Status: DISCONTINUED | OUTPATIENT
Start: 2024-11-02 | End: 2024-11-22 | Stop reason: HOSPADM

## 2024-11-02 RX ORDER — SODIUM CHLORIDE 9 MG/ML
INJECTION, SOLUTION INTRAVENOUS CONTINUOUS
Status: ACTIVE | OUTPATIENT
Start: 2024-11-02 | End: 2024-11-03

## 2024-11-02 RX ORDER — VALSARTAN 160 MG/1
80 TABLET ORAL DAILY
Status: DISCONTINUED | OUTPATIENT
Start: 2024-11-03 | End: 2024-11-22 | Stop reason: HOSPADM

## 2024-11-02 RX ORDER — CARVEDILOL 3.12 MG/1
6.25 TABLET ORAL 2 TIMES DAILY WITH MEALS
Status: DISCONTINUED | OUTPATIENT
Start: 2024-11-03 | End: 2024-11-22 | Stop reason: HOSPADM

## 2024-11-02 RX ORDER — MAGNESIUM SULFATE IN WATER 40 MG/ML
2000 INJECTION, SOLUTION INTRAVENOUS PRN
Status: DISCONTINUED | OUTPATIENT
Start: 2024-11-02 | End: 2024-11-22 | Stop reason: HOSPADM

## 2024-11-02 RX ORDER — ACETAMINOPHEN 325 MG/1
650 TABLET ORAL EVERY 6 HOURS PRN
Status: DISCONTINUED | OUTPATIENT
Start: 2024-11-02 | End: 2024-11-22 | Stop reason: HOSPADM

## 2024-11-02 RX ORDER — PANTOPRAZOLE SODIUM 40 MG/1
40 TABLET, DELAYED RELEASE ORAL
Status: DISCONTINUED | OUTPATIENT
Start: 2024-11-03 | End: 2024-11-22 | Stop reason: HOSPADM

## 2024-11-02 RX ORDER — CITALOPRAM HYDROBROMIDE 20 MG/1
20 TABLET ORAL DAILY
Status: DISCONTINUED | OUTPATIENT
Start: 2024-11-03 | End: 2024-11-22 | Stop reason: HOSPADM

## 2024-11-02 RX ORDER — POTASSIUM CHLORIDE 1500 MG/1
40 TABLET, EXTENDED RELEASE ORAL PRN
Status: DISCONTINUED | OUTPATIENT
Start: 2024-11-02 | End: 2024-11-22 | Stop reason: HOSPADM

## 2024-11-02 RX ORDER — ACETAMINOPHEN 650 MG/1
650 SUPPOSITORY RECTAL EVERY 6 HOURS PRN
Status: DISCONTINUED | OUTPATIENT
Start: 2024-11-02 | End: 2024-11-22 | Stop reason: HOSPADM

## 2024-11-02 RX ORDER — ENOXAPARIN SODIUM 100 MG/ML
40 INJECTION SUBCUTANEOUS DAILY
Status: DISCONTINUED | OUTPATIENT
Start: 2024-11-03 | End: 2024-11-22 | Stop reason: HOSPADM

## 2024-11-02 RX ORDER — MORPHINE SULFATE 2 MG/ML
2 INJECTION, SOLUTION INTRAMUSCULAR; INTRAVENOUS EVERY 4 HOURS PRN
Status: DISPENSED | OUTPATIENT
Start: 2024-11-02 | End: 2024-11-05

## 2024-11-02 RX ORDER — AMLODIPINE BESYLATE 5 MG/1
5 TABLET ORAL DAILY
Status: DISCONTINUED | OUTPATIENT
Start: 2024-11-03 | End: 2024-11-22 | Stop reason: HOSPADM

## 2024-11-02 RX ORDER — POTASSIUM CHLORIDE 7.45 MG/ML
10 INJECTION INTRAVENOUS PRN
Status: DISCONTINUED | OUTPATIENT
Start: 2024-11-02 | End: 2024-11-22 | Stop reason: HOSPADM

## 2024-11-02 RX ORDER — ONDANSETRON 4 MG/1
4 TABLET, ORALLY DISINTEGRATING ORAL EVERY 8 HOURS PRN
Status: DISCONTINUED | OUTPATIENT
Start: 2024-11-02 | End: 2024-11-22 | Stop reason: HOSPADM

## 2024-11-02 RX ORDER — ONDANSETRON 2 MG/ML
4 INJECTION INTRAMUSCULAR; INTRAVENOUS EVERY 6 HOURS PRN
Status: DISCONTINUED | OUTPATIENT
Start: 2024-11-02 | End: 2024-11-22 | Stop reason: HOSPADM

## 2024-11-02 RX ORDER — SODIUM CHLORIDE 0.9 % (FLUSH) 0.9 %
5-40 SYRINGE (ML) INJECTION EVERY 12 HOURS SCHEDULED
Status: DISCONTINUED | OUTPATIENT
Start: 2024-11-02 | End: 2024-11-22 | Stop reason: HOSPADM

## 2024-11-02 RX ORDER — SODIUM CHLORIDE 9 MG/ML
INJECTION, SOLUTION INTRAVENOUS PRN
Status: DISCONTINUED | OUTPATIENT
Start: 2024-11-02 | End: 2024-11-22 | Stop reason: HOSPADM

## 2024-11-02 RX ADMIN — MORPHINE SULFATE 2 MG: 2 INJECTION, SOLUTION INTRAMUSCULAR; INTRAVENOUS at 23:53

## 2024-11-02 RX ADMIN — Medication 1000 MG: at 23:45

## 2024-11-02 ASSESSMENT — LIFESTYLE VARIABLES
HOW MANY STANDARD DRINKS CONTAINING ALCOHOL DO YOU HAVE ON A TYPICAL DAY: PATIENT DOES NOT DRINK
HOW OFTEN DO YOU HAVE A DRINK CONTAINING ALCOHOL: NEVER

## 2024-11-02 ASSESSMENT — PAIN SCALES - GENERAL: PAINLEVEL_OUTOF10: 9

## 2024-11-02 ASSESSMENT — PAIN - FUNCTIONAL ASSESSMENT: PAIN_FUNCTIONAL_ASSESSMENT: 0-10

## 2024-11-03 ENCOUNTER — APPOINTMENT (OUTPATIENT)
Dept: CT IMAGING | Age: 79
DRG: 193 | End: 2024-11-03
Payer: MEDICARE

## 2024-11-03 PROBLEM — R07.9 RIGHT-SIDED CHEST PAIN: Status: ACTIVE | Noted: 2024-11-03

## 2024-11-03 LAB
B PARAP IS1001 DNA NPH QL NAA+NON-PROBE: NOT DETECTED
B PERT DNA SPEC QL NAA+PROBE: NOT DETECTED
BASOPHILS # BLD: 0 K/UL (ref 0–0.2)
BASOPHILS NFR BLD: 0 % (ref 0–2)
C PNEUM DNA NPH QL NAA+NON-PROBE: NOT DETECTED
EOSINOPHIL # BLD: 0 K/UL (ref 0–0.4)
EOSINOPHILS RELATIVE PERCENT: 0 % (ref 1–4)
ERYTHROCYTE [DISTWIDTH] IN BLOOD BY AUTOMATED COUNT: 14.6 % (ref 12.5–15.4)
EST. AVERAGE GLUCOSE BLD GHB EST-MCNC: 128 MG/DL
FLUAV RNA NPH QL NAA+NON-PROBE: NOT DETECTED
FLUBV RNA NPH QL NAA+NON-PROBE: NOT DETECTED
GLUCOSE BLD-MCNC: 168 MG/DL (ref 65–105)
GLUCOSE BLD-MCNC: 195 MG/DL (ref 65–105)
GLUCOSE BLD-MCNC: 199 MG/DL (ref 65–105)
HADV DNA NPH QL NAA+NON-PROBE: NOT DETECTED
HBA1C MFR BLD: 6.1 % (ref 4–6)
HCOV 229E RNA NPH QL NAA+NON-PROBE: NOT DETECTED
HCOV HKU1 RNA NPH QL NAA+NON-PROBE: NOT DETECTED
HCOV NL63 RNA NPH QL NAA+NON-PROBE: NOT DETECTED
HCOV OC43 RNA NPH QL NAA+NON-PROBE: NOT DETECTED
HCT VFR BLD AUTO: 38.1 % (ref 36–46)
HGB BLD-MCNC: 12.2 G/DL (ref 12–16)
HMPV RNA NPH QL NAA+NON-PROBE: NOT DETECTED
HPIV1 RNA NPH QL NAA+NON-PROBE: NOT DETECTED
HPIV2 RNA NPH QL NAA+NON-PROBE: NOT DETECTED
HPIV3 RNA NPH QL NAA+NON-PROBE: NOT DETECTED
HPIV4 RNA NPH QL NAA+NON-PROBE: NOT DETECTED
LYMPHOCYTES NFR BLD: 0.97 K/UL (ref 1–4.8)
LYMPHOCYTES RELATIVE PERCENT: 4 % (ref 24–44)
M PNEUMO DNA NPH QL NAA+NON-PROBE: NOT DETECTED
MCH RBC QN AUTO: 28 PG (ref 26–34)
MCHC RBC AUTO-ENTMCNC: 32 G/DL (ref 31–37)
MCV RBC AUTO: 87.5 FL (ref 80–100)
MONOCYTES NFR BLD: 1.7 K/UL (ref 0.1–1.2)
MONOCYTES NFR BLD: 7 % (ref 2–11)
MORPHOLOGY: NORMAL
NEUTROPHILS NFR BLD: 89 % (ref 36–66)
NEUTS SEG NFR BLD: 21.63 K/UL (ref 1.8–7.7)
PLATELET # BLD AUTO: 280 K/UL (ref 140–450)
PMV BLD AUTO: 7.2 FL (ref 6–12)
PROCALCITONIN SERPL-MCNC: 0.51 NG/ML (ref 0–0.09)
RBC # BLD AUTO: 4.36 M/UL (ref 4–5.2)
RSV RNA NPH QL NAA+NON-PROBE: NOT DETECTED
RV+EV RNA NPH QL NAA+NON-PROBE: NOT DETECTED
SARS-COV-2 RNA NPH QL NAA+NON-PROBE: NOT DETECTED
SPECIMEN DESCRIPTION: NORMAL
WBC OTHER # BLD: 24.3 K/UL (ref 3.5–11)

## 2024-11-03 PROCEDURE — 6370000000 HC RX 637 (ALT 250 FOR IP): Performed by: NURSE PRACTITIONER

## 2024-11-03 PROCEDURE — 94640 AIRWAY INHALATION TREATMENT: CPT

## 2024-11-03 PROCEDURE — 71260 CT THORAX DX C+: CPT

## 2024-11-03 PROCEDURE — 85025 COMPLETE CBC W/AUTO DIFF WBC: CPT

## 2024-11-03 PROCEDURE — 87449 NOS EACH ORGANISM AG IA: CPT

## 2024-11-03 PROCEDURE — 6370000000 HC RX 637 (ALT 250 FOR IP): Performed by: FAMILY MEDICINE

## 2024-11-03 PROCEDURE — 84145 PROCALCITONIN (PCT): CPT

## 2024-11-03 PROCEDURE — 2700000000 HC OXYGEN THERAPY PER DAY

## 2024-11-03 PROCEDURE — 36415 COLL VENOUS BLD VENIPUNCTURE: CPT

## 2024-11-03 PROCEDURE — 6360000002 HC RX W HCPCS: Performed by: FAMILY MEDICINE

## 2024-11-03 PROCEDURE — 1200000000 HC SEMI PRIVATE

## 2024-11-03 PROCEDURE — 0202U NFCT DS 22 TRGT SARS-COV-2: CPT

## 2024-11-03 PROCEDURE — 83036 HEMOGLOBIN GLYCOSYLATED A1C: CPT

## 2024-11-03 PROCEDURE — 87899 AGENT NOS ASSAY W/OPTIC: CPT

## 2024-11-03 PROCEDURE — 99223 1ST HOSP IP/OBS HIGH 75: CPT | Performed by: FAMILY MEDICINE

## 2024-11-03 PROCEDURE — 6360000004 HC RX CONTRAST MEDICATION: Performed by: INTERNAL MEDICINE

## 2024-11-03 PROCEDURE — 2580000003 HC RX 258: Performed by: FAMILY MEDICINE

## 2024-11-03 PROCEDURE — 2580000003 HC RX 258: Performed by: INTERNAL MEDICINE

## 2024-11-03 PROCEDURE — 82947 ASSAY GLUCOSE BLOOD QUANT: CPT

## 2024-11-03 RX ORDER — DEXTROMETHORPHAN POLISTIREX 30 MG/5ML
60 SUSPENSION ORAL EVERY 12 HOURS SCHEDULED
Status: DISCONTINUED | OUTPATIENT
Start: 2024-11-03 | End: 2024-11-22 | Stop reason: HOSPADM

## 2024-11-03 RX ORDER — IPRATROPIUM BROMIDE AND ALBUTEROL SULFATE 2.5; .5 MG/3ML; MG/3ML
1 SOLUTION RESPIRATORY (INHALATION)
Status: DISCONTINUED | OUTPATIENT
Start: 2024-11-03 | End: 2024-11-03

## 2024-11-03 RX ORDER — IPRATROPIUM BROMIDE AND ALBUTEROL SULFATE 2.5; .5 MG/3ML; MG/3ML
1 SOLUTION RESPIRATORY (INHALATION)
Status: DISCONTINUED | OUTPATIENT
Start: 2024-11-03 | End: 2024-11-04

## 2024-11-03 RX ORDER — TRAZODONE HYDROCHLORIDE 100 MG/1
100 TABLET ORAL NIGHTLY
Status: DISCONTINUED | OUTPATIENT
Start: 2024-11-03 | End: 2024-11-22 | Stop reason: HOSPADM

## 2024-11-03 RX ORDER — BENZONATATE 100 MG/1
200 CAPSULE ORAL 3 TIMES DAILY PRN
Status: DISCONTINUED | OUTPATIENT
Start: 2024-11-03 | End: 2024-11-22 | Stop reason: HOSPADM

## 2024-11-03 RX ORDER — 0.9 % SODIUM CHLORIDE 0.9 %
80 INTRAVENOUS SOLUTION INTRAVENOUS ONCE
Status: DISCONTINUED | OUTPATIENT
Start: 2024-11-03 | End: 2024-11-22 | Stop reason: HOSPADM

## 2024-11-03 RX ORDER — MELOXICAM 7.5 MG/1
15 TABLET ORAL DAILY
Status: DISCONTINUED | OUTPATIENT
Start: 2024-11-03 | End: 2024-11-22 | Stop reason: HOSPADM

## 2024-11-03 RX ORDER — SODIUM CHLORIDE 0.9 % (FLUSH) 0.9 %
10 SYRINGE (ML) INJECTION ONCE
Status: COMPLETED | OUTPATIENT
Start: 2024-11-03 | End: 2024-11-03

## 2024-11-03 RX ORDER — ROSUVASTATIN CALCIUM 20 MG/1
20 TABLET, COATED ORAL NIGHTLY
Status: DISCONTINUED | OUTPATIENT
Start: 2024-11-03 | End: 2024-11-08

## 2024-11-03 RX ORDER — IOPAMIDOL 755 MG/ML
75 INJECTION, SOLUTION INTRAVASCULAR
Status: COMPLETED | OUTPATIENT
Start: 2024-11-03 | End: 2024-11-03

## 2024-11-03 RX ADMIN — CARVEDILOL 6.25 MG: 3.12 TABLET, FILM COATED ORAL at 17:58

## 2024-11-03 RX ADMIN — TRAZODONE HYDROCHLORIDE 100 MG: 100 TABLET ORAL at 00:31

## 2024-11-03 RX ADMIN — MELOXICAM 15 MG: 7.5 TABLET ORAL at 09:22

## 2024-11-03 RX ADMIN — IOPAMIDOL 75 ML: 755 INJECTION, SOLUTION INTRAVENOUS at 14:28

## 2024-11-03 RX ADMIN — AMLODIPINE BESYLATE 5 MG: 5 TABLET ORAL at 09:43

## 2024-11-03 RX ADMIN — Medication 80 ML: at 14:28

## 2024-11-03 RX ADMIN — MORPHINE SULFATE 2 MG: 2 INJECTION, SOLUTION INTRAMUSCULAR; INTRAVENOUS at 19:56

## 2024-11-03 RX ADMIN — IPRATROPIUM BROMIDE AND ALBUTEROL SULFATE 1 DOSE: .5; 2.5 SOLUTION RESPIRATORY (INHALATION) at 20:44

## 2024-11-03 RX ADMIN — MORPHINE SULFATE 2 MG: 2 INJECTION, SOLUTION INTRAMUSCULAR; INTRAVENOUS at 13:33

## 2024-11-03 RX ADMIN — AZITHROMYCIN DIHYDRATE 500 MG: 500 INJECTION, POWDER, LYOPHILIZED, FOR SOLUTION INTRAVENOUS at 00:02

## 2024-11-03 RX ADMIN — VALSARTAN 80 MG: 160 TABLET, FILM COATED ORAL at 09:43

## 2024-11-03 RX ADMIN — SODIUM CHLORIDE: 9 INJECTION, SOLUTION INTRAVENOUS at 13:14

## 2024-11-03 RX ADMIN — SODIUM CHLORIDE, PRESERVATIVE FREE 10 ML: 5 INJECTION INTRAVENOUS at 09:43

## 2024-11-03 RX ADMIN — CARVEDILOL 6.25 MG: 3.12 TABLET, FILM COATED ORAL at 09:21

## 2024-11-03 RX ADMIN — SODIUM CHLORIDE, PRESERVATIVE FREE 10 ML: 5 INJECTION INTRAVENOUS at 14:28

## 2024-11-03 RX ADMIN — IPRATROPIUM BROMIDE AND ALBUTEROL SULFATE 1 DOSE: .5; 2.5 SOLUTION RESPIRATORY (INHALATION) at 11:12

## 2024-11-03 RX ADMIN — ENOXAPARIN SODIUM 40 MG: 100 INJECTION SUBCUTANEOUS at 09:22

## 2024-11-03 RX ADMIN — ROSUVASTATIN 20 MG: 20 TABLET, FILM COATED ORAL at 00:31

## 2024-11-03 RX ADMIN — DEXTROMETHORPHAN POLISTIREX 60 MG: 30 SUSPENSION ORAL at 21:34

## 2024-11-03 RX ADMIN — ROSUVASTATIN 20 MG: 20 TABLET, FILM COATED ORAL at 21:25

## 2024-11-03 RX ADMIN — CITALOPRAM HYDROBROMIDE 20 MG: 20 TABLET ORAL at 09:42

## 2024-11-03 RX ADMIN — MORPHINE SULFATE 2 MG: 2 INJECTION, SOLUTION INTRAMUSCULAR; INTRAVENOUS at 09:22

## 2024-11-03 RX ADMIN — MORPHINE SULFATE 2 MG: 2 INJECTION, SOLUTION INTRAMUSCULAR; INTRAVENOUS at 04:27

## 2024-11-03 RX ADMIN — PANTOPRAZOLE SODIUM 40 MG: 40 TABLET, DELAYED RELEASE ORAL at 09:22

## 2024-11-03 RX ADMIN — TRAZODONE HYDROCHLORIDE 100 MG: 100 TABLET ORAL at 21:25

## 2024-11-03 RX ADMIN — DEXTROMETHORPHAN POLISTIREX 60 MG: 30 SUSPENSION ORAL at 11:59

## 2024-11-03 ASSESSMENT — PAIN SCALES - GENERAL
PAINLEVEL_OUTOF10: 8
PAINLEVEL_OUTOF10: 5
PAINLEVEL_OUTOF10: 3
PAINLEVEL_OUTOF10: 9
PAINLEVEL_OUTOF10: 6

## 2024-11-03 ASSESSMENT — PAIN DESCRIPTION - ORIENTATION
ORIENTATION: RIGHT
ORIENTATION: RIGHT

## 2024-11-03 ASSESSMENT — PAIN DESCRIPTION - LOCATION
LOCATION: RIB CAGE
LOCATION: RIB CAGE

## 2024-11-03 ASSESSMENT — PAIN DESCRIPTION - ONSET: ONSET: ON-GOING

## 2024-11-03 ASSESSMENT — PAIN DESCRIPTION - DESCRIPTORS
DESCRIPTORS: ACHING;GNAWING;DISCOMFORT
DESCRIPTORS: ACHING;GNAWING

## 2024-11-03 ASSESSMENT — PAIN SCALES - WONG BAKER: WONGBAKER_NUMERICALRESPONSE: NO HURT

## 2024-11-03 ASSESSMENT — PAIN - FUNCTIONAL ASSESSMENT
PAIN_FUNCTIONAL_ASSESSMENT: ACTIVITIES ARE NOT PREVENTED
PAIN_FUNCTIONAL_ASSESSMENT: ACTIVITIES ARE NOT PREVENTED

## 2024-11-03 ASSESSMENT — PAIN DESCRIPTION - FREQUENCY: FREQUENCY: INTERMITTENT

## 2024-11-03 ASSESSMENT — PAIN DESCRIPTION - PAIN TYPE: TYPE: ACUTE PAIN

## 2024-11-03 NOTE — PROGRESS NOTES
11/03/24 1219   RT Protocol   History Pulmonary Disease 1   Respiratory pattern 0   Breath sounds 2   Cough 1   Indications for Bronchodilator Therapy Wheezing associated with pulm disorder   Bronchodilator Assessment Score 4

## 2024-11-03 NOTE — ED PROVIDER NOTES
[facility] Emergency Department  [address]  [facility] [address]  Phone: [phone]  Fax: [phone]    eMERGENCY dEPARTMENT eNCOUnter        Pt Name: Clray Harris  MRN: [id number]  Birthdate 1945  Date of evaluation: 11/2/24    CHIEF COMPLAINT     Chief Complaint   Patient presents with    Shortness of Breath    Chest Pain     Onset Wednesday    Chills       HISTORY OF PRESENT ILLNESS    Clary Harris is a 78 y.o. female who presents to the emergency department with pain in the right posterior shoulder and wrapping around to underneath her breast on that same right side.  She states been pretty constant waxing and waning with intensity since.  Patient stated deep breathing actually intensifies the pain and discomfort.  She also has shortness of breath and chest discomfort as described on that right side only.  It does not radiate to her neck or down her arm.  She denies any cough or congestion.  No recent immunizations.  No exposure to flu or COVID that she is aware.  She had subjective chills denied any fever.  No abdominal pain nausea or vomiting or diarrhea.  No recent falls injury or trauma.  No leg pain or swelling.  She does have history of COPD is not on home oxygen.  She quit smoking 30 years ago.  She has no history of heart disease kidney disease or cancer that she is aware.    REVIEW OF SYSTEMS     Review of Systems   Constitutional:  Negative for chills and fever.   HENT:  Negative for congestion, ear pain and sore throat.    Respiratory:  Positive for shortness of breath. Negative for cough and wheezing.    Cardiovascular:  Positive for chest pain. Negative for palpitations and leg swelling.   Gastrointestinal:  Negative for abdominal pain, diarrhea, nausea and vomiting.   Genitourinary:  Negative for dysuria, flank pain, frequency and hematuria.   Musculoskeletal:  Negative for back pain.   Skin:  Negative for rash.   Neurological:  Negative for  dizziness, light-headedness, numbness and headaches.       PAST MEDICAL HISTORY    has a past medical history of COPD (chronic obstructive pulmonary disease) (HCC), Diverticulosis, DJD (degenerative joint disease), Hypertension, Hypertriglyceridemia, Hypothyroidism, Mild valvular heart disease, Osteoarthritis, Sleep apnea, Type 2 diabetes mellitus without complication (HCC), and Vertigo.    SURGICAL HISTORY      has a past surgical history that includes Tubal ligation; Cholecystectomy; Colonoscopy; and Breast biopsy.    CURRENT MEDICATIONS       Previous Medications    AMLODIPINE (NORVASC) 5 MG TABLET    Take 1 tablet by mouth daily    BREZTRI AEROSPHERE 160-9-4.8 MCG/ACT AERO    Inhale 2 puffs into the lungs in the morning and at bedtime    CANDESARTAN (ATACAND) 32 MG TABLET    Take 1 tablet by mouth daily    CARVEDILOL (COREG) 6.25 MG TABLET    Take 1 tablet by mouth 2 times daily (with meals)    CITALOPRAM (CELEXA) 20 MG TABLET    Take 1 tablet by mouth daily    COENZYME Q10 200 MG CAPS CAPSULE    Take 1 capsule by mouth daily    LEVOTHYROXINE (SYNTHROID) 112 MCG TABLET    Take 1 tablet by mouth Daily    MELOXICAM (MOBIC) 15 MG TABLET    Take 1 tablet by mouth daily    OMEPRAZOLE (PRILOSEC) 20 MG DELAYED RELEASE CAPSULE    Take 1 capsule by mouth Daily    ROSUVASTATIN (CRESTOR) 20 MG TABLET    Take 1 tablet by mouth daily    SEMAGLUTIDE, 1 MG/DOSE, 2 MG/1.5ML SOPN    Inject into the skin    TRAZODONE (DESYREL) 100 MG TABLET    Take 1 tablet by mouth nightly       ALLERGIES     is allergic to furosemide, hydrochlorothiazide, and sulfa antibiotics.    FAMILY HISTORY     She indicated that her mother is . She indicated that her father is .     family history includes Heart Disease in her father; High Blood Pressure in her father.    SOCIAL HISTORY      reports that she quit smoking about 34 years ago. Her smoking use included cigarettes. She started smoking about 60 years ago. She has a 13 pack-year

## 2024-11-03 NOTE — H&P
[facility] Medicine   IN-PATIENT [facility]    HISTORY AND PHYSICAL EXAMINATION            Date:   11/3/2024  Patient name:  Clary Harris  Date of admission:  11/2/2024  9:06 PM  MRN:   [id number]  Account:  [id number]  YOB: 1945  PCP:    Kandi Thompson DO  Room:   [address] [place] [address]  Code Status:    Full Code      History Obtained From:     patient and daughter who is present    History of Present Illness:     Clary Harris is a 78 y.o. Non- / non  female who presents with Shortness of Breath, Chest Pain (Onset Wednesday), and Chills   and is admitted to the hospital for the management of Pneumonia due to infectious agent.    Patient developed pain right chest last week, no obvious precipitating factor.  Had noticed with that some ongoing intermittent increase in wheezing and shortness of breath.  No significant fever, occasional chills, came to ER where she was diagnosed to have acute pneumonia with pleuritic component.  Pulmonary service has been consulted to clarify if minimal elevation D-dimer could be related to blood clot, and CT scan chest may be ordered.  Patient has not seen pulmonary specialist for years, does take Breztri and were not we were not able to schedule it through hospital pharmacy.      Review of Systems     Constitutional denies fever, occasional chills, ongoing right chest pain  HEENT no sore throat,  Cardiac no palpitations  Respiratory increased dyspnea and increased wheezing, and ongoing pain right chest which gets worse with a deep breath  Gastrointestinal no nausea vomiting or diarrhea  Genitourinary unremarkable  Neurologic unremarkable  Bones and joints discomfort right chest as mentioned above  Skin no rashes  Psychological unremarkable      Past Medical History:     Past Medical History:   Diagnosis Date    COPD (chronic obstructive pulmonary disease) (HCC)     Diverticulosis     DJD (degenerative joint disease)     right  4.0 - 5.2 m/uL    Hemoglobin 12.2 12.0 - 16.0 g/dL    Hematocrit 38.1 36 - 46 %    MCV 87.5 80 - 100 fL    MCH 28.0 26 - 34 pg    MCHC 32.0 31 - 37 g/dL    RDW 14.6 12.5 - 15.4 %    Platelets 280 140 - 450 k/uL    MPV 7.2 6.0 - 12.0 fL    Neutrophils % 89 (H) 36 - 66 %    Lymphocytes % 4 (L) 24 - 44 %    Monocytes % 7 2 - 11 %    Eosinophils % 0 (L) 1 - 4 %    Basophils % 0 0 - 2 %    Neutrophils Absolute 21.63 (H) 1.8 - 7.7 k/uL    Lymphocytes Absolute 0.97 (L) 1.0 - 4.8 k/uL    Monocytes Absolute 1.70 (H) 0.1 - 1.2 k/uL    Eosinophils Absolute 0.00 0.0 - 0.4 k/uL    Basophils Absolute 0.00 0.0 - 0.2 k/uL    Morphology Normal        Imaging/Diagnostics:  XR CHEST PORTABLE    Result Date: 11/2/2024  1. Limited study due to small lung volumes and elevation the right hemidiaphragm. 2. Mild bibasilar opacities are predominately linear and favored to represent atelectasis related to small lung volumes.  Pneumonia is considered less likely.       Assessment :      Hospital Problems             Last Modified POA    * (Principal) Pneumonia due to infectious agent 11/2/2024 Yes    COPD (chronic obstructive pulmonary disease) ([facility]) 11/3/2024 Yes    Hypertension 11/3/2024 Yes    Hypothyroidism 11/3/2024 Yes    Mild valvular heart disease 11/3/2024 Yes    Type 2 diabetes mellitus without complication ([facility]) 11/3/2024 Yes    Right-sided chest pain 11/3/2024 Yes       Plan:     Patient admitted with pneumonia with pleuritic component, pulmonary service involved and sorting out whether or not patient may need further testing such as CT chest to exclude pulmonary embolism.  No obvious trauma to account for her right-sided chest pain, and could be related to pleuritic component of pneumonia.  Appropriate meds ordered for pain relief, and we will order some oral tramadol since Tylenol ineffective and only alternative otherwise is IV morphine.  Pulmonary service has been consulted.  Will repeat chest x-ray or get CT chest in the near

## 2024-11-03 NOTE — PLAN OF CARE
Problem: Respiratory - Adult  Goal: Achieves optimal ventilation and oxygenation  Outcome: Progressing  Flowsheets (Taken 11/3/2024 1135)  Achieves optimal ventilation and oxygenation: Assess for changes in respiratory status

## 2024-11-03 NOTE — CONSULTS
[facility] PULMONARY, CRITICAL CARE & SLEEP   Jose Ramon Oh MD/ Lopez ROJAS AGACNPOLO-BC NP-C  Elisabet ROJAS NP-C  Bruno ROJAS NP-C   CONSULT NOTE      Date of Admission: 11/2/2024  9:06 PM    Chief complaint: \"I was short of breath\"    Referring Physician: * No referring provider recorded for this case *  PCP: Kandi Thompson DO     History of Present Illness: Clary Harris is a 78 y.o. White (non-)   female who presents with chills, fatigue, cough and shortness of breath.  She states her symptoms started on Wednesday.  However she does tell me she had been ill for about 2 weeks.  She states she is on Breztri for history of COPD, she does not follow-up with pulmonology.  She states years ago she did see Dr. Olea and did have a PFT at [facility] showing some mild COPD.  She is not on an albuterol rescue inhaler or a nebulizer.  She lives alone, she states she has not been exposed anyone that has been ill that she is aware of.  She did have a rapid COVID which was negative.  She did have an elevated white count upon admission greater than 16.  She was placed on empiric antibiotics.  Chest x-ray did show some bibasilar infiltrates.    She tells me she started to have some right sided under her breast pain, worse with deep breath.  She states she is not bringing up any phlegm.  She did have a pulse ox of 86% on room air when she came into the emergency room, she was placed on 2 L.  She now has a pulse ox 93 to 94%.  She is not normally on any oxygen.  She does admit to feeling better today.  She did have a temp 99 range in the ER.  She states at home she never had a temp greater than 99.    She denies any nausea, vomiting or diarrhea.  She denies any history of allergies.  She denies any prior history of asthma.  She did have history of sleep apnea many years ago and was placed on CPAP for a while however she had been retested and was        Family History:   Family History   Problem Relation Age of Onset    High Blood Pressure Father     Heart Disease Father        Review of Systems  Gen: Positive for fatigue, chills and malaise  HEENT: Denies any sore throat or runny nose  CV: No chest pain  Lungs: Positive for pleuritic pain on the right side, worse with deep breathing or cough, positive for some shortness of breath, had wheezing earlier which has resolved prior to admission, no hemoptysis  Abd: No abdominal pain, no nausea vomiting diarrhea  Extr: No lower extremity calf pain or swelling  Skin: No new rashes or lesions  Neuro: No headaches or dizziness    Physical Exam  Vital Signs: Blood pressure 101/88, pulse 95, temperature 98.5 °F (36.9 °C), temperature source Oral, resp. rate 17, height 1.651 m (5' 5\"), weight 83.9 kg (185 lb), SpO2 92%.    Admission Weight: Weight - Scale: 83.9 kg (185 lb)    General: Alert, elderly female, looks fairly well for her age, not in any distress sitting up in bed  Ears: Hearing is intact  Nose: Nares are patent, nasal cannula in place  Throat: No oral candidiasis  Neck: Supple, no JVD  Chest: Chest wall expansion is equal bilaterally  Lungs: Lung sounds with bibasilar rales noted, worse on the left, no wheezing, no rhonchi, respirations easy at rest on 2 L, pulse ox 93 to 94%  Cardiac: S1-S2, no murmur  Abdomen: Obese, soft  Back: Normal curvature  Extremities: No edema  Neurologic: Alert and appropriate  Skin: Pink, warm, dry       XR CHEST PORTABLE    Result Date: 11/2/2024  EXAMINATION: ONE XRAY VIEW OF THE CHEST 11/2/2024 8:27 pm COMPARISON: None. HISTORY: ORDERING SYSTEM PROVIDED HISTORY: cp TECHNOLOGIST PROVIDED HISTORY: cp Reason for Exam: short of breath, chest pain FINDINGS: Lung volumes are small.  There is mild elevation the right hemidiaphragm. There are mild opacities at the lung bases which appear predominately linear. The lungs are otherwise clear.  No effusion is noted.  Cardiac silhouette is

## 2024-11-03 NOTE — ED TRIAGE NOTES
Meds:  Medications   sodium chloride flush 0.9 % injection 5-40 mL (has no administration in time range)   sodium chloride flush 0.9 % injection 5-40 mL (has no administration in time range)   0.9 % sodium chloride infusion (has no administration in time range)   potassium chloride (KLOR-CON M) extended release tablet 40 mEq (has no administration in time range)     Or   potassium bicarb-citric acid (EFFER-K) effervescent tablet 40 mEq (has no administration in time range)     Or   potassium chloride 10 mEq/100 mL IVPB (Peripheral Line) (has no administration in time range)   magnesium sulfate 2000 mg in 50 mL IVPB premix (has no administration in time range)   enoxaparin (LOVENOX) injection 40 mg (has no administration in time range)   ondansetron (ZOFRAN-ODT) disintegrating tablet 4 mg (has no administration in time range)     Or   ondansetron (ZOFRAN) injection 4 mg (has no administration in time range)   polyethylene glycol (GLYCOLAX) packet 17 g (has no administration in time range)   acetaminophen (TYLENOL) tablet 650 mg (has no administration in time range)     Or   acetaminophen (TYLENOL) suppository 650 mg (has no administration in time range)   0.9 % sodium chloride infusion (has no administration in time range)   cefTRIAXone (ROCEPHIN) 1000 mg in sterile water 10 mL IV syringe (has no administration in time range)   azithromycin (ZITHROMAX) 500 mg in sodium chloride 0.9 % 250 mL IVPB ([id number]) (has no administration in time range)          Ambulatory Status:  Presents to emergency department  because of falls (Syncope, seizure, or loss of consciousness): No, Age > 70: Yes, Altered Mental Status, Intoxication with alcohol or substance confusion (Disorientation, impaired judgment, poor safety awaremess, or inability to follow instructions): No, Impaired Mobility: Ambulates or transfers with assistive devices or assistance; Unable to ambulate or transer.: No, Nursing Judgement:  daily    LEVOTHYROXINE (SYNTHROID) 112 MCG TABLET    Take 1 tablet by mouth Daily    MELOXICAM (MOBIC) 15 MG TABLET    Take 1 tablet by mouth daily    OMEPRAZOLE (PRILOSEC) 20 MG DELAYED RELEASE CAPSULE    Take 1 capsule by mouth Daily    ROSUVASTATIN (CRESTOR) 20 MG TABLET    Take 1 tablet by mouth daily    SEMAGLUTIDE, 1 MG/DOSE, 2 MG/1.5ML SOPN    Inject into the skin    TRAZODONE (DESYREL) 100 MG TABLET    Take 1 tablet by mouth nightly     Orders Placed This Encounter   Medications    DISCONTD: cefTRIAXone (ROCEPHIN) 1000 mg in sterile water 10 mL IV syringe     Order Specific Question:   Antimicrobial Indications     Answer:   Pneumonia (CAP)    DISCONTD: azithromycin (ZITHROMAX) 500 mg in sodium chloride 0.9 % 250 mL IVPB ([id number])     Order Specific Question:   Antimicrobial Indications     Answer:   Pneumonia (CAP)    sodium chloride flush 0.9 % injection 5-40 mL    sodium chloride flush 0.9 % injection 5-40 mL    0.9 % sodium chloride infusion    OR Linked Order Group     potassium chloride (KLOR-CON M) extended release tablet 40 mEq     potassium bicarb-citric acid (EFFER-K) effervescent tablet 40 mEq     potassium chloride 10 mEq/100 mL IVPB (Peripheral Line)    magnesium sulfate 2000 mg in 50 mL IVPB premix    enoxaparin (LOVENOX) injection 40 mg     Order Specific Question:   Indication of Use     Answer:   Prophylaxis-DVT/PE    OR Linked Order Group     ondansetron (ZOFRAN-ODT) disintegrating tablet 4 mg     ondansetron (ZOFRAN) injection 4 mg    polyethylene glycol (GLYCOLAX) packet 17 g    OR Linked Order Group     acetaminophen (TYLENOL) tablet 650 mg     acetaminophen (TYLENOL) suppository 650 mg    0.9 % sodium chloride infusion    cefTRIAXone (ROCEPHIN) 1000 mg in sterile water 10 mL IV syringe     Order Specific Question:   Antimicrobial Indications     Answer:   Pneumonia (CAP)     Order Specific Question:   CAP duration of therapy     Answer:   5 days     Order Specific Question:   Suspected

## 2024-11-04 ENCOUNTER — APPOINTMENT (OUTPATIENT)
Dept: GENERAL RADIOLOGY | Age: 79
DRG: 193 | End: 2024-11-04
Payer: MEDICARE

## 2024-11-04 ENCOUNTER — APPOINTMENT (OUTPATIENT)
Dept: ULTRASOUND IMAGING | Age: 79
DRG: 193 | End: 2024-11-04
Attending: INTERNAL MEDICINE
Payer: MEDICARE

## 2024-11-04 LAB
ERYTHROCYTE [DISTWIDTH] IN BLOOD BY AUTOMATED COUNT: 15.2 % (ref 12.5–15.4)
GLUCOSE BLD-MCNC: 119 MG/DL (ref 65–105)
GLUCOSE BLD-MCNC: 176 MG/DL (ref 65–105)
GLUCOSE BLD-MCNC: 247 MG/DL (ref 65–105)
HCT VFR BLD AUTO: 37.8 % (ref 36–46)
HGB BLD-MCNC: 12.2 G/DL (ref 12–16)
L PNEUMO1 AG UR QL IA.RAPID: NEGATIVE
MCH RBC QN AUTO: 28.5 PG (ref 26–34)
MCHC RBC AUTO-ENTMCNC: 32.3 G/DL (ref 31–37)
MCV RBC AUTO: 88.2 FL (ref 80–100)
PLATELET # BLD AUTO: 342 K/UL (ref 140–450)
PMV BLD AUTO: 7.6 FL (ref 6–12)
RBC # BLD AUTO: 4.29 M/UL (ref 4–5.2)
S PNEUM AG SPEC QL: NEGATIVE
SPECIMEN SOURCE: NORMAL
WBC OTHER # BLD: 22.1 K/UL (ref 3.5–11)

## 2024-11-04 PROCEDURE — 6360000002 HC RX W HCPCS: Performed by: FAMILY MEDICINE

## 2024-11-04 PROCEDURE — 76604 US EXAM CHEST: CPT

## 2024-11-04 PROCEDURE — 6370000000 HC RX 637 (ALT 250 FOR IP): Performed by: FAMILY MEDICINE

## 2024-11-04 PROCEDURE — 85027 COMPLETE CBC AUTOMATED: CPT

## 2024-11-04 PROCEDURE — 36415 COLL VENOUS BLD VENIPUNCTURE: CPT

## 2024-11-04 PROCEDURE — 1200000000 HC SEMI PRIVATE

## 2024-11-04 PROCEDURE — 94640 AIRWAY INHALATION TREATMENT: CPT

## 2024-11-04 PROCEDURE — 2700000000 HC OXYGEN THERAPY PER DAY

## 2024-11-04 PROCEDURE — 71045 X-RAY EXAM CHEST 1 VIEW: CPT

## 2024-11-04 PROCEDURE — 6370000000 HC RX 637 (ALT 250 FOR IP): Performed by: NURSE PRACTITIONER

## 2024-11-04 PROCEDURE — 82947 ASSAY GLUCOSE BLOOD QUANT: CPT

## 2024-11-04 PROCEDURE — 94761 N-INVAS EAR/PLS OXIMETRY MLT: CPT

## 2024-11-04 PROCEDURE — 2580000003 HC RX 258: Performed by: FAMILY MEDICINE

## 2024-11-04 RX ORDER — ALBUTEROL SULFATE 0.83 MG/ML
2.5 SOLUTION RESPIRATORY (INHALATION) EVERY 4 HOURS PRN
Status: DISCONTINUED | OUTPATIENT
Start: 2024-11-04 | End: 2024-11-22 | Stop reason: HOSPADM

## 2024-11-04 RX ORDER — IPRATROPIUM BROMIDE AND ALBUTEROL SULFATE 2.5; .5 MG/3ML; MG/3ML
1 SOLUTION RESPIRATORY (INHALATION)
Status: DISCONTINUED | OUTPATIENT
Start: 2024-11-05 | End: 2024-11-05

## 2024-11-04 RX ORDER — LEVOTHYROXINE SODIUM 50 UG/1
100 TABLET ORAL DAILY
Status: DISCONTINUED | OUTPATIENT
Start: 2024-11-04 | End: 2024-11-22 | Stop reason: HOSPADM

## 2024-11-04 RX ADMIN — Medication 1000 MG: at 00:03

## 2024-11-04 RX ADMIN — SODIUM CHLORIDE, PRESERVATIVE FREE 10 ML: 5 INJECTION INTRAVENOUS at 20:57

## 2024-11-04 RX ADMIN — VALSARTAN 80 MG: 160 TABLET, FILM COATED ORAL at 08:53

## 2024-11-04 RX ADMIN — AMLODIPINE BESYLATE 5 MG: 5 TABLET ORAL at 08:53

## 2024-11-04 RX ADMIN — AZITHROMYCIN DIHYDRATE 500 MG: 500 INJECTION, POWDER, LYOPHILIZED, FOR SOLUTION INTRAVENOUS at 00:12

## 2024-11-04 RX ADMIN — DEXTROMETHORPHAN POLISTIREX 60 MG: 30 SUSPENSION ORAL at 08:54

## 2024-11-04 RX ADMIN — SODIUM CHLORIDE, PRESERVATIVE FREE 10 ML: 5 INJECTION INTRAVENOUS at 08:54

## 2024-11-04 RX ADMIN — IPRATROPIUM BROMIDE AND ALBUTEROL SULFATE 1 DOSE: .5; 2.5 SOLUTION RESPIRATORY (INHALATION) at 20:54

## 2024-11-04 RX ADMIN — PANTOPRAZOLE SODIUM 40 MG: 40 TABLET, DELAYED RELEASE ORAL at 05:52

## 2024-11-04 RX ADMIN — TRAZODONE HYDROCHLORIDE 100 MG: 100 TABLET ORAL at 21:13

## 2024-11-04 RX ADMIN — POLYETHYLENE GLYCOL 3350 17 G: 17 POWDER, FOR SOLUTION ORAL at 05:55

## 2024-11-04 RX ADMIN — DEXTROMETHORPHAN POLISTIREX 60 MG: 30 SUSPENSION ORAL at 21:15

## 2024-11-04 RX ADMIN — MELOXICAM 15 MG: 7.5 TABLET ORAL at 08:53

## 2024-11-04 RX ADMIN — CARVEDILOL 6.25 MG: 3.12 TABLET, FILM COATED ORAL at 08:54

## 2024-11-04 RX ADMIN — SODIUM CHLORIDE, PRESERVATIVE FREE 10 ML: 5 INJECTION INTRAVENOUS at 05:25

## 2024-11-04 RX ADMIN — ROSUVASTATIN 20 MG: 20 TABLET, FILM COATED ORAL at 21:13

## 2024-11-04 RX ADMIN — CITALOPRAM HYDROBROMIDE 20 MG: 20 TABLET ORAL at 08:53

## 2024-11-04 RX ADMIN — ENOXAPARIN SODIUM 40 MG: 100 INJECTION SUBCUTANEOUS at 08:53

## 2024-11-04 ASSESSMENT — PAIN DESCRIPTION - ORIENTATION: ORIENTATION: RIGHT

## 2024-11-04 ASSESSMENT — PAIN SCALES - GENERAL: PAINLEVEL_OUTOF10: 2

## 2024-11-04 ASSESSMENT — PAIN DESCRIPTION - LOCATION: LOCATION: RIB CAGE

## 2024-11-04 NOTE — CARE COORDINATION
ANA MARIA updated that patient is scheduled for 1230 IR at [place] on 11/6/24/ 1130 transportation request for 11/6/24 faxed to [facility]. RN to clarify with pulmonology if plan for chest tube will include intrapleural tpa and if patient will need transferred to [facility].

## 2024-11-04 NOTE — CARE COORDINATION
to RETURNING to current housing:   Potential Assistance needed at discharge: N/A            Potential DME:    Patient expects to discharge to: House  Plan for transportation at discharge: Family    Financial    Payor: MEDICARE / Plan: MEDICARE PART A AND B / Product Type: *No Product type* /     Does insurance require precert for SNF: Yes    Potential assistance Purchasing Medications: No  Meds-to-Beds request:        [id number] - [place], [place] - [address] - P [phone] - F [phone]  [address] [address]  Phone: [phone] Fax: [phone]    [facility] - [place], [place] - [address] [place] [address] - P [phone] - F [phone]  [address] [address]  Phone: [phone] Fax: [phone]      Notes:    Factors facilitating achievement of predicted outcomes: Motivated, Cooperative, and Pleasant    Barriers to discharge: Decreased endurance and Medical complications    Additional Case Management Notes:   CM spoke with patient to discuss transitional planning. Patient plans to return home independently at discharge. Patient is currently on 3L NC and will need home O2 eval prior to discharge if she is unable to wean off of supplemental O2.    The Plan for Transition of Care is related to the following treatment goals of Hypoxia [R09.02]  Pneumonia due to infectious agent [J18.9]  Pneumonia of both lower lobes due to infectious organism [J18.9]  Dyspnea, unspecified type [R06.00]  Chest pain, unspecified type [R07.9]    IF APPLICABLE: The Patient and/or patient representative Clary and her family were provided with a choice of provider and agrees with the discharge plan. Freedom of choice list with basic dialogue that supports the patient's individualized plan of care/goals and shares the quality data associated with the providers was provided to: Patient   Patient Representative Name:       The Patient and/or  Patient Representative Agree with the Discharge Plan? Yes    Izabela Aggarwal RN  Case Management Department

## 2024-11-04 NOTE — RT PROTOCOL NOTE
RT Inhaler-Nebulizer Bronchodilator Protocol Note    There is a bronchodilator order in the chart from a provider indicating to follow the RT Bronchodilator Protocol and there is an “Initiate RT Inhaler-Nebulizer Bronchodilator Protocol” order as well (see protocol at bottom of note).    CXR Findings:  XR CHEST PORTABLE    Result Date: 11/4/2024  Mild interval worsening or severe distribution right moderate pleural effusion with associated airspace opacities, which may represent atelectasis when compared to most recent CT chest.     XR CHEST PORTABLE    Result Date: 11/2/2024  1. Limited study due to small lung volumes and elevation the right hemidiaphragm. 2. Mild bibasilar opacities are predominately linear and favored to represent atelectasis related to small lung volumes.  Pneumonia is considered less likely.       The findings from the last RT Protocol Assessment were as follows:   History Pulmonary Disease: Smoker 15 pack years or more  Respiratory Pattern: Dyspnea on exertion or RR 21-25 bpm  Breath Sounds: Slightly diminished and/or crackles  Cough: Strong, spontaneous, non-productive  Indication for Bronchodilator Therapy: Wheezing associated with pulm disorder  Bronchodilator Assessment Score: 5    Aerosolized bronchodilator medication orders have been revised according to the RT Inhaler-Nebulizer Bronchodilator Protocol below.    Respiratory Therapist to perform RT Therapy Protocol Assessment initially then follow the protocol.  Repeat RT Therapy Protocol Assessment PRN for score 0-3 or on second treatment, BID, and PRN for scores above 3.    No Indications - adjust the frequency to every 6 hours PRN wheezing or bronchospasm, if no treatments needed after 48 hours then discontinue using Per Protocol order mode.     If indication present, adjust the RT bronchodilator orders based on the Bronchodilator Assessment Score as indicated below.  Use Inhaler orders unless patient has one or more of the following:

## 2024-11-04 NOTE — PROGRESS NOTES
Patient daughter, Milagro, was not satisfied with having the chest tube/thoracentesis on Thursday.  Requested to have Dr Brewer call her directly to answer questions.  Per daughter, Dr Brewer told her it was Dr Chand's call to transfer to patient for this procedure.  Dtr requested Dr Chand to call her directly.  Dr Chand spoke with dtr then this nurse and asked if this nurse would try to schedule at [facility] earlier than Thursday.  Called [facility], placed on schedule for Wednesday at 1230 pm arrival time.   Updated daughter, Dr Galvan, and Dr Brewer.    Dr Brewer was satisifed with this and stated \"once tube is in, he will instill TPA and/or make sure a sample is sent for lab testing\".

## 2024-11-04 NOTE — PROGRESS NOTES
11/2/2024 Yes    COPD (chronic obstructive pulmonary disease) (HCC) 11/3/2024 Yes    Hypertension 11/3/2024 Yes    Hypothyroidism 11/3/2024 Yes    Mild valvular heart disease 11/3/2024 Yes    Type 2 diabetes mellitus without complication (HCC) 11/3/2024 Yes    Right-sided chest pain 11/3/2024 Yes       Plan:     Patient being followed by pulmonary service, they are planning on trying to drain fluid from right chest today for diagnostic purposes.  Breztri has been reordered, and continue current treatment plan.    Medical Decision Making: Ildefonso Galvan MD  11/4/2024  10:03 AM

## 2024-11-04 NOTE — PROGRESS NOTES
Physician Progress Note      PATIENT:               SIN GUARDADO  CSN #:                  [id number]  :                       1945  ADMIT DATE:       2024 9:06 PM  DISCH DATE:  RESPONDING  PROVIDER #:        Luis Galvan MD          QUERY TEXT:    Pt admitted with PNA and acute hypoxic respiratory failure. Pt noted to have   WBC 16.6>24.3, Procal 0.51, temp 99.2, HR , B/P's 101/88, 139/95, RR   16-22 O2 2-4 L NC 87-92%. If possible, please document in the progress notes   and discharge summary if you are evaluating and /or treating any of the   following:  The medical record reflects the following:  Risk Factors: Age 78 PNA KARENA COPD Former smoker  Clinical Indicators:  WBC 16.6>24.3, Procal 0.51, temp 99.2, HR , B/P's   101/88, 139/95, RR 16-22 O2 2-4 L NC 87-92%. CT Chest No PE. CXR Mild   bibasilar opacities are predominately linear and favored to represent   atelectasis related to small lung volumes. Pneumonia is considered less  likely. H/P Patient admitted with pneumonia with pleuritic component Pulm   Consult Acute hypoxic respiratory insufficiency related to pneumonia, pulse ox   86% on room air, now on 2 L Community-acquired pneumonia Right-sided pleurisy   Elevated D-dimer, felt to be related to current infection  COPD, mild per patient, Leukocytosis related to pneumonia       Treatment: IV Zithromax/Rocephin, CXR CT Chest Oxygen labs monitor, Proventil,   tessalon    Any questions  Please Call  Indiana Moralez RN,BSN,[facility]  -,-[phone]  [phone]  Options provided:  -- Sepsis, present on admission  -- PNA without Sepsis  -- Other - I will add my own diagnosis  -- Disagree - Not applicable / Not valid  -- Disagree - Clinically unable to determine / Unknown  -- Refer to Clinical Documentation Reviewer    PROVIDER RESPONSE TEXT:    This patient has PNA without Sepsis.    Query created by: Indiana Moralez on 2024 11:43 AM      Electronically signed by:

## 2024-11-04 NOTE — PLAN OF CARE
Problem: Chronic Conditions and Co-morbidities  Goal: Patient's chronic conditions and co-morbidity symptoms are monitored and maintained or improved  Outcome: Progressing  Flowsheets (Taken 11/3/2024 1955)  Care Plan - Patient's Chronic Conditions and Co-Morbidity Symptoms are Monitored and Maintained or Improved: Monitor and assess patient's chronic conditions and comorbid symptoms for stability, deterioration, or improvement     Problem: Discharge Planning  Goal: Discharge to home or other facility with appropriate resources  Outcome: Progressing  Flowsheets (Taken 11/3/2024 1955)  Discharge to home or other facility with appropriate resources:   Identify barriers to discharge with patient and caregiver   Arrange for needed discharge resources and transportation as appropriate   Identify discharge learning needs (meds, wound care, etc)   Refer to discharge planning if patient needs post-hospital services based on physician order or complex needs related to functional status, cognitive ability or social support system     Problem: Pain  Goal: Verbalizes/displays adequate comfort level or baseline comfort level  Outcome: Progressing  Flowsheets  Taken 11/4/2024 0557  Verbalizes/displays adequate comfort level or baseline comfort level:   Assess pain using appropriate pain scale   Administer analgesics based on type and severity of pain and evaluate response   Implement non-pharmacological measures as appropriate and evaluate response  Taken 11/4/2024 0000  Verbalizes/displays adequate comfort level or baseline comfort level:   Assess pain using appropriate pain scale   Administer analgesics based on type and severity of pain and evaluate response   Implement non-pharmacological measures as appropriate and evaluate response  Taken 11/3/2024 2026  Verbalizes/displays adequate comfort level or baseline comfort level:   Assess pain using appropriate pain scale   Administer analgesics based on type and severity of  pain and evaluate response   Implement non-pharmacological measures as appropriate and evaluate response  Taken 11/3/2024 1955  Verbalizes/displays adequate comfort level or baseline comfort level:   Encourage patient to monitor pain and request assistance   Assess pain using appropriate pain scale   Administer analgesics based on type and severity of pain and evaluate response   Implement non-pharmacological measures as appropriate and evaluate response   Notify Licensed Independent Practitioner if interventions unsuccessful or patient reports new pain     Problem: Respiratory - Adult  Goal: Achieves optimal ventilation and oxygenation  Outcome: Progressing  Flowsheets  Taken 11/3/2024 2044 by Dari Simmons RCP  Achieves optimal ventilation and oxygenation: Assess for changes in respiratory status  Taken 11/3/2024 1955 by Shanon Cooper RN  Achieves optimal ventilation and oxygenation:   Assess for changes in respiratory status   Assess for changes in mentation and behavior   Position to facilitate oxygenation and minimize respiratory effort   Oxygen supplementation based on oxygen saturation or arterial blood gases   Respiratory therapy support as indicated   Assess and instruct to report shortness of breath or any respiratory difficulty     Problem: Safety - Adult  Goal: Free from fall injury  Outcome: Progressing

## 2024-11-04 NOTE — PROGRESS NOTES
Called [facility] IR for IR consult for chest tube placement.  Spoke with Gala, will return call to this nurse after speaking with provider.      Per Dr Hilton, not chest tube not recommended at this time and will re-evaluate on Thursday when IR is at [place].  At that time, they may place chest tube or do a simple thoracentesis.

## 2024-11-05 ENCOUNTER — APPOINTMENT (OUTPATIENT)
Dept: GENERAL RADIOLOGY | Age: 79
DRG: 193 | End: 2024-11-05
Payer: MEDICARE

## 2024-11-05 ENCOUNTER — HOSPITAL ENCOUNTER (OUTPATIENT)
Dept: INTERVENTIONAL RADIOLOGY/VASCULAR | Age: 79
Discharge: HOME | End: 2024-11-07
Payer: MEDICARE

## 2024-11-05 VITALS
RESPIRATION RATE: 28 BRPM | SYSTOLIC BLOOD PRESSURE: 116 MMHG | HEART RATE: 80 BPM | OXYGEN SATURATION: 91 % | DIASTOLIC BLOOD PRESSURE: 79 MMHG

## 2024-11-05 LAB
ALBUMIN SERPL-MCNC: 3.1 G/DL (ref 3.5–5.2)
ALBUMIN/GLOB SERPL: 0.9 {RATIO} (ref 1–2.5)
ALP SERPL-CCNC: 128 U/L (ref 35–104)
ALT SERPL-CCNC: 81 U/L (ref 5–33)
ANION GAP SERPL CALCULATED.3IONS-SCNC: 7 MMOL/L (ref 9–17)
AST SERPL-CCNC: 167 U/L
BASOPHILS # BLD: 0 K/UL (ref 0–0.2)
BASOPHILS NFR BLD: 0 % (ref 0–2)
BILIRUB SERPL-MCNC: 0.3 MG/DL (ref 0.3–1.2)
BUN SERPL-MCNC: 19 MG/DL (ref 8–23)
CALCIUM SERPL-MCNC: 8.9 MG/DL (ref 8.6–10.4)
CHLORIDE SERPL-SCNC: 101 MMOL/L (ref 98–107)
CO2 SERPL-SCNC: 28 MMOL/L (ref 20–31)
CREAT SERPL-MCNC: 0.7 MG/DL (ref 0.5–0.9)
EOSINOPHIL # BLD: 0 K/UL (ref 0–0.4)
EOSINOPHILS RELATIVE PERCENT: 0 % (ref 1–4)
ERYTHROCYTE [DISTWIDTH] IN BLOOD BY AUTOMATED COUNT: 15.1 % (ref 12.5–15.4)
GFR, ESTIMATED: 89 ML/MIN/1.73M2
GLUCOSE BLD-MCNC: 143 MG/DL (ref 65–105)
GLUCOSE SERPL-MCNC: 263 MG/DL (ref 70–99)
HCT VFR BLD AUTO: 35.7 % (ref 36–46)
HGB BLD-MCNC: 11.7 G/DL (ref 12–16)
LYMPHOCYTES NFR BLD: 0.5 K/UL (ref 1–4.8)
LYMPHOCYTES RELATIVE PERCENT: 3 % (ref 24–44)
MCH RBC QN AUTO: 28.6 PG (ref 26–34)
MCHC RBC AUTO-ENTMCNC: 32.7 G/DL (ref 31–37)
MCV RBC AUTO: 87.4 FL (ref 80–100)
MONOCYTES NFR BLD: 1 K/UL (ref 0.1–1.2)
MONOCYTES NFR BLD: 6 % (ref 2–11)
NEUTROPHILS NFR BLD: 91 % (ref 36–66)
NEUTS SEG NFR BLD: 15.9 K/UL (ref 1.8–7.7)
PLATELET # BLD AUTO: 334 K/UL (ref 140–450)
PMV BLD AUTO: 7.5 FL (ref 6–12)
POTASSIUM SERPL-SCNC: 4.2 MMOL/L (ref 3.7–5.3)
PROT SERPL-MCNC: 6.6 G/DL (ref 6.4–8.3)
RBC # BLD AUTO: 4.09 M/UL (ref 4–5.2)
SODIUM SERPL-SCNC: 136 MMOL/L (ref 135–144)
WBC OTHER # BLD: 17.4 K/UL (ref 3.5–11)

## 2024-11-05 PROCEDURE — 85025 COMPLETE CBC W/AUTO DIFF WBC: CPT

## 2024-11-05 PROCEDURE — 0W9930Z DRAINAGE OF RIGHT PLEURAL CAVITY WITH DRAINAGE DEVICE, PERCUTANEOUS APPROACH: ICD-10-PCS | Performed by: RADIOLOGY

## 2024-11-05 PROCEDURE — 87070 CULTURE OTHR SPECIMN AEROBIC: CPT

## 2024-11-05 PROCEDURE — 84157 ASSAY OF PROTEIN OTHER: CPT

## 2024-11-05 PROCEDURE — 87075 CULTR BACTERIA EXCEPT BLOOD: CPT

## 2024-11-05 PROCEDURE — 6370000000 HC RX 637 (ALT 250 FOR IP): Performed by: FAMILY MEDICINE

## 2024-11-05 PROCEDURE — 32557 INSERT CATH PLEURA W/ IMAGE: CPT

## 2024-11-05 PROCEDURE — 2580000003 HC RX 258

## 2024-11-05 PROCEDURE — 2580000003 HC RX 258: Performed by: FAMILY MEDICINE

## 2024-11-05 PROCEDURE — 82947 ASSAY GLUCOSE BLOOD QUANT: CPT

## 2024-11-05 PROCEDURE — 87205 SMEAR GRAM STAIN: CPT

## 2024-11-05 PROCEDURE — 2060000000 HC ICU INTERMEDIATE R&B

## 2024-11-05 PROCEDURE — 80053 COMPREHEN METABOLIC PANEL: CPT

## 2024-11-05 PROCEDURE — 82945 GLUCOSE OTHER FLUID: CPT

## 2024-11-05 PROCEDURE — 6360000002 HC RX W HCPCS

## 2024-11-05 PROCEDURE — 36415 COLL VENOUS BLD VENIPUNCTURE: CPT

## 2024-11-05 PROCEDURE — 32551 INSERTION OF CHEST TUBE: CPT

## 2024-11-05 PROCEDURE — 2709999900 IR CHEST TUBE INSERTION

## 2024-11-05 PROCEDURE — 99233 SBSQ HOSP IP/OBS HIGH 50: CPT | Performed by: FAMILY MEDICINE

## 2024-11-05 PROCEDURE — 83615 LACTATE (LD) (LDH) ENZYME: CPT

## 2024-11-05 PROCEDURE — 94640 AIRWAY INHALATION TREATMENT: CPT

## 2024-11-05 PROCEDURE — 71045 X-RAY EXAM CHEST 1 VIEW: CPT

## 2024-11-05 PROCEDURE — 6360000002 HC RX W HCPCS: Performed by: FAMILY MEDICINE

## 2024-11-05 PROCEDURE — 87641 MR-STAPH DNA AMP PROBE: CPT

## 2024-11-05 RX ORDER — INSULIN LISPRO 100 [IU]/ML
3-15 INJECTION, SOLUTION INTRAVENOUS; SUBCUTANEOUS
Status: DISCONTINUED | OUTPATIENT
Start: 2024-11-05 | End: 2024-11-22 | Stop reason: HOSPADM

## 2024-11-05 RX ORDER — GLUCAGON 1 MG/ML
1 KIT INJECTION PRN
Status: DISCONTINUED | OUTPATIENT
Start: 2024-11-05 | End: 2024-11-22 | Stop reason: HOSPADM

## 2024-11-05 RX ORDER — DEXTROSE MONOHYDRATE 100 MG/ML
INJECTION, SOLUTION INTRAVENOUS CONTINUOUS PRN
Status: DISCONTINUED | OUTPATIENT
Start: 2024-11-05 | End: 2024-11-22 | Stop reason: HOSPADM

## 2024-11-05 RX ADMIN — ROSUVASTATIN 20 MG: 20 TABLET, FILM COATED ORAL at 21:31

## 2024-11-05 RX ADMIN — TRAZODONE HYDROCHLORIDE 100 MG: 100 TABLET ORAL at 21:31

## 2024-11-05 RX ADMIN — MELOXICAM 15 MG: 7.5 TABLET ORAL at 09:40

## 2024-11-05 RX ADMIN — VALSARTAN 80 MG: 160 TABLET, FILM COATED ORAL at 09:39

## 2024-11-05 RX ADMIN — CARVEDILOL 6.25 MG: 3.12 TABLET, FILM COATED ORAL at 16:09

## 2024-11-05 RX ADMIN — LEVOTHYROXINE SODIUM 100 MCG: 50 TABLET ORAL at 06:14

## 2024-11-05 RX ADMIN — Medication 1000 MG: at 00:38

## 2024-11-05 RX ADMIN — AMPICILLIN SODIUM AND SULBACTAM SODIUM 3000 MG: 2; 1 INJECTION, POWDER, FOR SOLUTION INTRAMUSCULAR; INTRAVENOUS at 21:31

## 2024-11-05 RX ADMIN — SODIUM CHLORIDE, PRESERVATIVE FREE 10 ML: 5 INJECTION INTRAVENOUS at 09:40

## 2024-11-05 RX ADMIN — CARVEDILOL 6.25 MG: 3.12 TABLET, FILM COATED ORAL at 09:40

## 2024-11-05 RX ADMIN — AMLODIPINE BESYLATE 5 MG: 5 TABLET ORAL at 09:40

## 2024-11-05 RX ADMIN — PANTOPRAZOLE SODIUM 40 MG: 40 TABLET, DELAYED RELEASE ORAL at 06:14

## 2024-11-05 RX ADMIN — ENOXAPARIN SODIUM 40 MG: 100 INJECTION SUBCUTANEOUS at 09:40

## 2024-11-05 RX ADMIN — CITALOPRAM HYDROBROMIDE 20 MG: 20 TABLET ORAL at 09:39

## 2024-11-05 RX ADMIN — AMPICILLIN SODIUM AND SULBACTAM SODIUM 3000 MG: 2; 1 INJECTION, POWDER, FOR SOLUTION INTRAMUSCULAR; INTRAVENOUS at 16:12

## 2024-11-05 RX ADMIN — AZITHROMYCIN DIHYDRATE 500 MG: 500 INJECTION, POWDER, LYOPHILIZED, FOR SOLUTION INTRAVENOUS at 00:48

## 2024-11-05 RX ADMIN — SODIUM CHLORIDE: 9 INJECTION, SOLUTION INTRAVENOUS at 00:48

## 2024-11-05 RX ADMIN — SODIUM CHLORIDE, PRESERVATIVE FREE 10 ML: 5 INJECTION INTRAVENOUS at 21:32

## 2024-11-05 ASSESSMENT — PAIN SCALES - GENERAL: PAINLEVEL_OUTOF10: 6

## 2024-11-05 NOTE — PLAN OF CARE
Problem: Chronic Conditions and Co-morbidities  Goal: Patient's chronic conditions and co-morbidity symptoms are monitored and maintained or improved  11/5/2024 1537 by Adriana Abbott RN  Outcome: Progressing  Flowsheets  Taken 11/5/2024 1519 by Adriana Abbott RN  Care Plan - Patient's Chronic Conditions and Co-Morbidity Symptoms are Monitored and Maintained or Improved: Monitor and assess patient's chronic conditions and comorbid symptoms for stability, deterioration, or improvement  Taken 11/5/2024 0939 by Bee Mujica RN  Care Plan - Patient's Chronic Conditions and Co-Morbidity Symptoms are Monitored and Maintained or Improved: Monitor and assess patient's chronic conditions and comorbid symptoms for stability, deterioration, or improvement  11/5/2024 0410 by Eugenio Espinosa RN  Outcome: Progressing  Flowsheets (Taken 11/4/2024 2122)  Care Plan - Patient's Chronic Conditions and Co-Morbidity Symptoms are Monitored and Maintained or Improved: Monitor and assess patient's chronic conditions and comorbid symptoms for stability, deterioration, or improvement     Problem: Discharge Planning  Goal: Discharge to home or other facility with appropriate resources  11/5/2024 1537 by Adriana Abbott RN  Outcome: Progressing  Flowsheets  Taken 11/5/2024 1519 by Adriana Abbott RN  Discharge to home or other facility with appropriate resources: Identify barriers to discharge with patient and caregiver  Taken 11/5/2024 0939 by Bee Mujica RN  Discharge to home or other facility with appropriate resources: Identify barriers to discharge with patient and caregiver  11/5/2024 0410 by Eugenio Espinosa RN  Outcome: Progressing  Flowsheets (Taken 11/4/2024 2122)  Discharge to home or other facility with appropriate resources: Identify barriers to discharge with patient and caregiver     Problem: Pain  Goal: Verbalizes/displays adequate comfort level or baseline comfort level  11/5/2024 1537 by Adriana Abbott  RN  Outcome: Progressing  11/5/2024 0410 by Eugenio Espinosa RN  Outcome: Progressing     Problem: Respiratory - Adult  Goal: Achieves optimal ventilation and oxygenation  11/5/2024 1537 by Adriana Abbott RN  Outcome: Progressing  Flowsheets  Taken 11/5/2024 1519 by Adriana Abbott RN  Achieves optimal ventilation and oxygenation: Assess for changes in respiratory status  Taken 11/5/2024 0939 by Bee Mujica RN  Achieves optimal ventilation and oxygenation: Assess for changes in respiratory status  11/5/2024 0410 by Eugenio Espinosa RN  Outcome: Progressing  Flowsheets (Taken 11/4/2024 2122)  Achieves optimal ventilation and oxygenation: Assess for changes in respiratory status     Problem: Safety - Adult  Goal: Free from fall injury  11/5/2024 1537 by Adriana Abbott RN  Outcome: Progressing  11/5/2024 0410 by Eugenio Espinosa RN  Outcome: Progressing     Problem: Neurosensory - Adult  Goal: Achieves stable or improved neurological status  11/5/2024 1537 by Adriana Abbott RN  Outcome: Progressing  Flowsheets (Taken 11/5/2024 0939 by Bee Mujica RN)  Achieves stable or improved neurological status: Assess for and report changes in neurological status  11/5/2024 0410 by Eugenio Espinosa RN  Outcome: Progressing     Problem: Cardiovascular - Adult  Goal: Maintains optimal cardiac output and hemodynamic stability  11/5/2024 1537 by Adriana Abbott RN  Outcome: Progressing  Flowsheets (Taken 11/5/2024 0939 by Bee Mujica RN)  Maintains optimal cardiac output and hemodynamic stability: Monitor blood pressure and heart rate  11/5/2024 0410 by Eugenio Espinosa RN  Outcome: Progressing  Flowsheets (Taken 11/4/2024 2122)  Maintains optimal cardiac output and hemodynamic stability: Monitor blood pressure and heart rate     Problem: Skin/Tissue Integrity - Adult  Goal: Skin integrity remains intact  11/5/2024 1537 by Adriana Abbott RN  Outcome: Progressing  Flowsheets  Taken 11/5/2024 1519 by Scar

## 2024-11-05 NOTE — PROGRESS NOTES
[facility] Medicine  IN-PATIENT SERVICE   [facility] - Location: [place]    Progress Note    11/5/2024    11:11 AM    Name:   Clary Harris  MRN:     [id number]     Acct:      [id number]   Room:   315/315-01   Day:  3  Admit Date:  11/2/2024  9:06 PM    PCP:   Kandi Thompson DO  Code Status:  Full Code    Subjective:   Vital signs are stable patient is afebrile this morning.  SaO2 running in low 90s on 4 L sometimes will drift into the high 80s.  She feels more congested in the right lung.  No nausea no vomiting does complain of some pleuritic pain right lateral chest    Medications:     Allergies:    Allergies   Allergen Reactions    Furosemide     Hydrochlorothiazide     Sulfa Antibiotics Hives, Other (See Comments) and Itching       Current Meds:   Scheduled Meds:    levothyroxine  100 mcg Oral Daily    ipratropium 0.5 mg-albuterol 2.5 mg  1 Dose Inhalation TID RT    meloxicam  15 mg Oral Daily    rosuvastatin  20 mg Oral Nightly    traZODone  100 mg Oral Nightly    dextromethorphan  60 mg Oral 2 times per day    sodium chloride  80 mL IntraVENous Once    sodium chloride flush  5-40 mL IntraVENous 2 times per day    enoxaparin  40 mg SubCUTAneous Daily    cefTRIAXone (ROCEPHIN) IV  1,000 mg IntraVENous Q24H    azithromycin  500 mg IntraVENous Q24H    amLODIPine  5 mg Oral Daily    valsartan  80 mg Oral Daily    carvedilol  6.25 mg Oral BID WC    citalopram  20 mg Oral Daily    pantoprazole  40 mg Oral QAM AC     Continuous Infusions:    sodium chloride 10 mL/hr at 11/05/24 0048     PRN Meds: albuterol, benzonatate, sodium chloride flush, sodium chloride, potassium chloride **OR** potassium alternative oral replacement **OR** potassium chloride, magnesium sulfate, ondansetron **OR** ondansetron, polyethylene glycol, acetaminophen **OR** acetaminophen, morphine    Data:     Vitals: Signs are stable.  BP (!) 144/97   Pulse 97   Temp 98.6 °F (37 °C) (Oral)   Resp 23   Ht 1.651 m (5'  5\")   Wt 83.9 kg (185 lb)   SpO2 92%   BMI 30.79 kg/m²   Temp (24hrs), Av.1 °F (37.3 °C), Min:98.6 °F (37 °C), Max:99.6 °F (37.6 °C)    Recent Labs     24  0724  1113 11/04/24  1545   POCGLU 168* 119* 247* 176*       I/O (24Hr):  No intake or output data in the 24 hours ending 24 1111    Labs:  Hematology:  Recent Labs     24  0824  0556   WBC 16.6* 24.3* 22.1*   RBC 4.68 4.36 4.29   HGB 13.4 12.2 12.2   HCT 40.6 38.1 37.8   MCV 86.7 87.5 88.2   MCH 28.5 28.0 28.5   MCHC 32.9 32.0 32.3   RDW 14.5 14.6 15.2    280 342   MPV 7.2 7.2 7.6   INR 0.9  --   --    DDIMER 1.72  --   --      Chemistry:  Recent Labs     24      K 4.0      CO2 27   GLUCOSE 242*   BUN 27*   CREATININE 0.9   ANIONGAP 9   LABGLOM 66   CALCIUM 9.2   PROBNP 90   TROPHS <6     Recent Labs     24  1208 24  1113 11/04/24  1545   LABA1C  --  6.1*  --   --   --   --   --   --    AST 14  --   --   --   --   --   --   --    ALT 12  --   --   --   --   --   --   --    ALKPHOS 84  --   --   --   --   --   --   --    BILITOT 0.6  --   --   --   --   --   --   --    POCGLU  --   --  195* 199* 168* 119* 247* 176*     ABG:No results found for: \"POCPH\", \"PHART\", \"PH\", \"POCPCO2\", \"QZW1MHX\", \"PCO2\", \"POCPO2\", \"PO2ART\", \"PO2\", \"POCHCO3\", \"AWN4MNE\", \"HCO3\", \"NBEA\", \"PBEA\", \"BEART\", \"BE\", \"THGBART\", \"THB\", \"ICW3PHR\", \"KXIW7UOV\", \"V0CFHYHR\", \"O2SAT\", \"FIO2\"  Lab Results   Component Value Date/Time    SPECIAL LEFT HAND 10ML 2024 10:35 PM     Lab Results   Component Value Date/Time    CULTURE NO GROWTH 1 DAY 2024 10:35 PM       Radiology:  XR CHEST PORTABLE    Result Date: 2024  Unchanged patchy airspace opacity on the right. Increased size of loculated effusion superolaterally on the right. Stable moderate cardiomegaly.     US CHEST INCLUDING MEDIASTINUM    Result

## 2024-11-05 NOTE — PLAN OF CARE
Problem: Respiratory - Adult  Goal: Achieves optimal ventilation and oxygenation  Outcome: Progressing  Flowsheets  Taken 11/4/2024 2059 by Art Ennis RCP  Achieves optimal ventilation and oxygenation:   Assess for changes in respiratory status   Respiratory therapy support as indicated   Oxygen supplementation based on oxygen saturation or arterial blood gases   Encourage broncho-pulmonary hygiene including cough, deep breathe, incentive spirometry   Assess and instruct to report shortness of breath or any respiratory difficulty

## 2024-11-05 NOTE — PROGRESS NOTES
Updated Dr Mock face to face that patient was feeling worse on the right side, audibly wheezing, and chest xray showed her worsening.  Received phone call from Hellen, unit manager, to call [facility] and get patient in today for chest tube. [facility] was able to accommodate patient today by 2 pm.  Transport was unable to guarantee patient would arrive by 2.  ANG Solano requested [facility] to place patient highest priority.  Updated patient and daughter, Milagro of todays plan with patient being transferred to PCU upon return.

## 2024-11-05 NOTE — RT PROTOCOL NOTE
RT Inhaler-Nebulizer Bronchodilator Protocol Note    There is a bronchodilator order in the chart from a provider indicating to follow the RT Bronchodilator Protocol and there is an “Initiate RT Inhaler-Nebulizer Bronchodilator Protocol” order as well (see protocol at bottom of note).    CXR Findings:  XR CHEST PORTABLE    Result Date: 11/4/2024  Mild interval worsening or severe distribution right moderate pleural effusion with associated airspace opacities, which may represent atelectasis when compared to most recent CT chest.     XR CHEST PORTABLE    Result Date: 11/2/2024  1. Limited study due to small lung volumes and elevation the right hemidiaphragm. 2. Mild bibasilar opacities are predominately linear and favored to represent atelectasis related to small lung volumes.  Pneumonia is considered less likely.       The findings from the last RT Protocol Assessment were as follows:   History Pulmonary Disease: Smoker 15 pack years or more  Respiratory Pattern: Mild dyspnea at rest, irregular pattern, or RR 21-25 bpm  Breath Sounds: Intermittent or unilateral wheezes  Cough: Strong, spontaneous, non-productive  Indication for Bronchodilator Therapy: Wheezing associated with pulm disorder  Bronchodilator Assessment Score: 9    Aerosolized bronchodilator medication orders have been revised according to the RT Inhaler-Nebulizer Bronchodilator Protocol below.    Respiratory Therapist to perform RT Therapy Protocol Assessment initially then follow the protocol.  Repeat RT Therapy Protocol Assessment PRN for score 0-3 or on second treatment, BID, and PRN for scores above 3.    No Indications - adjust the frequency to every 6 hours PRN wheezing or bronchospasm, if no treatments needed after 48 hours then discontinue using Per Protocol order mode.     If indication present, adjust the RT bronchodilator orders based on the Bronchodilator Assessment Score as indicated below.  Use Inhaler orders unless patient has one or

## 2024-11-05 NOTE — PROGRESS NOTES
Pulmonary Progress Note  [facility]monary [facility] Critical Care Specialists  Dr Lopez Lynch/Dr Jose Ramon Brewer/Dr Won Posey APRN AGACNP-BC  Elisabet ROJAS NP-C      Patient - Clary Harris,  Age - 78 y.o.    - 1945      Room Number - 315/315-01   [facility] -  [id number]   [facility] # - [id number]  Date of Admission -  2024  9:06 PM        Consulting Service/Physician   Consulting - Luis Galvan MD  Primary Care Physician - Kandi Thompson DO     SUBJECTIVE   Patient evaluated laying on right side in bed, she reports increased work of breathing and shortness of breath with increased pain to the right side of her chest. She remains on 4 L of oxygen at this time and is satting in the low 90s and notably now has expiratory wheezing in addition to the diffuse bronchial breath sounds and diminished air movement.  She notes that the increased shortness of breath has limited her ability to use the incentive spirometer and declined her morning breathing treatment as she felt the treatment yesterday evening caused her discomfort with significant palpitations, instead used her Breztri inhaler this morning.    Patient has no other new concerns or questions and is hopeful that her chest tube procedure will help to improve her pneumonia.  She has been able to eat and drink without difficulty and denies having bowel or bladder dysfunction.    OBJECTIVE   VITALS    height is 1.651 m (5' 5\") and weight is 83.9 kg (185 lb). Her oral temperature is 98.6 °F (37 °C). Her blood pressure is 144/97 (abnormal) and her pulse is 97. Her respiration is 23 and oxygen saturation is 92%.     Body mass index is 30.79 kg/m².  Temperature Range: Temp: 98.6 °F (37 °C) Temp  Av.1 °F (37.3 °C)  Min: 98.6 °F (37 °C)  Max: 99.6 °F (37.6 °C)  BP Range:  Systolic (24hrs), Av , Min:136 , Max:144     Diastolic (24hrs), Av, Min:85, Max:97    Pulse Range:  Pulse  Av  Min: 94  Max: 97  Respiration Range: Resp  Av.3  Min: 20  Max: 24  Current Pulse Ox::  SpO2: 92 %  24HR Pulse Ox Range:  SpO2  Av %  Min: 87 %  Max: 92 %  Oxygen Amount and Delivery: O2 Flow Rate (L/min): 4 L/min    Wt Readings from Last 3 Encounters:   24 83.9 kg (185 lb)   06/10/24 87.7 kg (193 lb 6.4 oz)   23 88.9 kg (196 lb)       I/O (24 Hours)  No intake or output data in the 24 hours ending 24 1005    EXAM     General Appearance  Awake, alert, oriented, in no acute distress  HEENT - normocephalic, atraumatic.   Neck - Supple,  trachea midline   Lungs -Audible expiratory wheezing on examination.  Diminished air sounds over the right hemithorax.  Diffuse bronchial breath sounds present to bilateral lungs.  Cardiovascular - Heart sounds are normal.  Tachycardic rate and regular rhythm   Abdomen - Soft, nontender, nondistended, no masses or organomegaly  Neurologic - There are no focal motor or sensory deficits  Skin - No bruising or bleeding  Extremities - No clubbing, cyanosis, edema observed    MEDS      levothyroxine  100 mcg Oral Daily    ipratropium 0.5 mg-albuterol 2.5 mg  1 Dose Inhalation TID RT    meloxicam  15 mg Oral Daily    rosuvastatin  20 mg Oral Nightly    traZODone  100 mg Oral Nightly    dextromethorphan  60 mg Oral 2 times per day    sodium chloride  80 mL IntraVENous Once    sodium chloride flush  5-40 mL IntraVENous 2 times per day    enoxaparin  40 mg SubCUTAneous Daily    cefTRIAXone (ROCEPHIN) IV  1,000 mg IntraVENous Q24H    azithromycin  500 mg IntraVENous Q24H    amLODIPine  5 mg Oral Daily    valsartan  80 mg Oral Daily    carvedilol  6.25 mg Oral BID WC    citalopram  20 mg Oral Daily    pantoprazole  40 mg Oral QAM AC      sodium chloride 10 mL/hr at 24 0048     albuterol, benzonatate, sodium chloride flush, sodium chloride, potassium chloride **OR** potassium alternative oral replacement **OR** potassium chloride, magnesium sulfate,

## 2024-11-05 NOTE — PLAN OF CARE
Problem: Chronic Conditions and Co-morbidities  Goal: Patient's chronic conditions and co-morbidity symptoms are monitored and maintained or improved  Outcome: Progressing  Flowsheets (Taken 11/4/2024 2122)  Care Plan - Patient's Chronic Conditions and Co-Morbidity Symptoms are Monitored and Maintained or Improved: Monitor and assess patient's chronic conditions and comorbid symptoms for stability, deterioration, or improvement     Problem: Discharge Planning  Goal: Discharge to home or other facility with appropriate resources  Outcome: Progressing  Flowsheets (Taken 11/4/2024 2122)  Discharge to home or other facility with appropriate resources: Identify barriers to discharge with patient and caregiver     Problem: Pain  Goal: Verbalizes/displays adequate comfort level or baseline comfort level  Outcome: Progressing     Problem: Respiratory - Adult  Goal: Achieves optimal ventilation and oxygenation  11/5/2024 0410 by Eugenio Espinosa RN  Outcome: Progressing  Flowsheets (Taken 11/4/2024 2122)  Achieves optimal ventilation and oxygenation: Assess for changes in respiratory status  11/4/2024 2059 by Art Ennis RCP  Outcome: Progressing  Flowsheets  Taken 11/4/2024 2059 by Art Ennis RCP  Achieves optimal ventilation and oxygenation:   Assess for changes in respiratory status   Respiratory therapy support as indicated   Oxygen supplementation based on oxygen saturation or arterial blood gases   Encourage broncho-pulmonary hygiene including cough, deep breathe, incentive spirometry   Assess and instruct to report shortness of breath or any respiratory difficulty  Taken 11/4/2024 0849 by Bee Mujica RN  Achieves optimal ventilation and oxygenation: Assess for changes in respiratory status     Problem: Safety - Adult  Goal: Free from fall injury  Outcome: Progressing     Problem: Neurosensory - Adult  Goal: Achieves stable or improved neurological status  Outcome: Progressing     Problem:

## 2024-11-05 NOTE — CARE COORDINATION
[facility] Quality Flow/Interdisciplinary Rounds Progress Note    Quality Flow Rounds held on November 5, 2024 at 0930    Disciplines Attending:  Bedside Nurse, , and Nursing Unit Leadership    Barriers to Discharge: clinical status    Anticipated Discharge Date:   TBD    Anticipated Discharge Disposition: Home    Readmission Risk              Risk of Unplanned Readmission:  10           Discussed patient goal for the day, patient clinical progression, and barriers to discharge. Plan for chest tube placement tomorrow at [place]. Goal to return home at discharge. Watch for home O2 needs.        1140- CM notified by BETO Humphrey that patient needs STAT transportation to [facility] by 2pm for IR chest tube placement. ANA MARIA called and spoke with Nicki from [facility] and she states that they have a crew out on a STAT run right now and that they will  patient after. Nicki states that she hopes that patient will be picked up by 1:30 but she can not guarantee time. BETO Humphrey updated.

## 2024-11-06 LAB
ALBUMIN SERPL-MCNC: 3.1 G/DL (ref 3.5–5.2)
ALBUMIN/GLOB SERPL: 0.8 {RATIO} (ref 1–2.5)
ALP SERPL-CCNC: 200 U/L (ref 35–104)
ALT SERPL-CCNC: 149 U/L (ref 5–33)
ANION GAP SERPL CALCULATED.3IONS-SCNC: 6 MMOL/L (ref 9–17)
AST SERPL-CCNC: 275 U/L
BASOPHILS # BLD: 0 K/UL (ref 0–0.2)
BASOPHILS NFR BLD: 0 % (ref 0–2)
BILIRUB SERPL-MCNC: 0.4 MG/DL (ref 0.3–1.2)
BUN SERPL-MCNC: 17 MG/DL (ref 8–23)
CALCIUM SERPL-MCNC: 9.4 MG/DL (ref 8.6–10.4)
CHLORIDE SERPL-SCNC: 103 MMOL/L (ref 98–107)
CO2 SERPL-SCNC: 31 MMOL/L (ref 20–31)
CREAT SERPL-MCNC: 0.7 MG/DL (ref 0.5–0.9)
EKG ATRIAL RATE: 95 BPM
EKG P AXIS: 13 DEGREES
EKG P-R INTERVAL: 166 MS
EKG Q-T INTERVAL: 326 MS
EKG QRS DURATION: 102 MS
EKG QTC CALCULATION (BAZETT): 409 MS
EKG R AXIS: -32 DEGREES
EKG T AXIS: 79 DEGREES
EKG VENTRICULAR RATE: 95 BPM
EOSINOPHIL # BLD: 0 K/UL (ref 0–0.4)
EOSINOPHILS RELATIVE PERCENT: 0 % (ref 1–4)
ERYTHROCYTE [DISTWIDTH] IN BLOOD BY AUTOMATED COUNT: 15 % (ref 12.5–15.4)
GFR, ESTIMATED: 89 ML/MIN/1.73M2
GLUCOSE BLD-MCNC: 125 MG/DL (ref 65–105)
GLUCOSE BLD-MCNC: 158 MG/DL (ref 65–105)
GLUCOSE BLD-MCNC: 244 MG/DL (ref 65–105)
GLUCOSE SERPL-MCNC: 269 MG/DL (ref 70–99)
HCT VFR BLD AUTO: 36.6 % (ref 36–46)
HGB BLD-MCNC: 11.9 G/DL (ref 12–16)
LYMPHOCYTES NFR BLD: 0.7 K/UL (ref 1–4.8)
LYMPHOCYTES RELATIVE PERCENT: 5 % (ref 24–44)
MCH RBC QN AUTO: 28.5 PG (ref 26–34)
MCHC RBC AUTO-ENTMCNC: 32.5 G/DL (ref 31–37)
MCV RBC AUTO: 87.5 FL (ref 80–100)
MONOCYTES NFR BLD: 1 K/UL (ref 0.1–1.2)
MONOCYTES NFR BLD: 7 % (ref 2–11)
NEUTROPHILS NFR BLD: 88 % (ref 36–66)
NEUTS SEG NFR BLD: 11.8 K/UL (ref 1.8–7.7)
PLATELET # BLD AUTO: 340 K/UL (ref 140–450)
PMV BLD AUTO: 7.5 FL (ref 6–12)
POTASSIUM SERPL-SCNC: 4.1 MMOL/L (ref 3.7–5.3)
PROT SERPL-MCNC: 7 G/DL (ref 6.4–8.3)
RBC # BLD AUTO: 4.19 M/UL (ref 4–5.2)
SODIUM SERPL-SCNC: 140 MMOL/L (ref 135–144)
WBC OTHER # BLD: 13.6 K/UL (ref 3.5–11)

## 2024-11-06 PROCEDURE — 97116 GAIT TRAINING THERAPY: CPT

## 2024-11-06 PROCEDURE — 97162 PT EVAL MOD COMPLEX 30 MIN: CPT

## 2024-11-06 PROCEDURE — 82947 ASSAY GLUCOSE BLOOD QUANT: CPT

## 2024-11-06 PROCEDURE — 36415 COLL VENOUS BLD VENIPUNCTURE: CPT

## 2024-11-06 PROCEDURE — 6360000002 HC RX W HCPCS

## 2024-11-06 PROCEDURE — 97165 OT EVAL LOW COMPLEX 30 MIN: CPT

## 2024-11-06 PROCEDURE — 2700000000 HC OXYGEN THERAPY PER DAY

## 2024-11-06 PROCEDURE — 6360000002 HC RX W HCPCS: Performed by: FAMILY MEDICINE

## 2024-11-06 PROCEDURE — 6370000000 HC RX 637 (ALT 250 FOR IP): Performed by: FAMILY MEDICINE

## 2024-11-06 PROCEDURE — 94761 N-INVAS EAR/PLS OXIMETRY MLT: CPT

## 2024-11-06 PROCEDURE — 6360000002 HC RX W HCPCS: Performed by: INTERNAL MEDICINE

## 2024-11-06 PROCEDURE — 3E0L317 INTRODUCTION OF OTHER THROMBOLYTIC INTO PLEURAL CAVITY, PERCUTANEOUS APPROACH: ICD-10-PCS | Performed by: INTERNAL MEDICINE

## 2024-11-06 PROCEDURE — 2060000000 HC ICU INTERMEDIATE R&B

## 2024-11-06 PROCEDURE — 2580000003 HC RX 258

## 2024-11-06 PROCEDURE — 6370000000 HC RX 637 (ALT 250 FOR IP): Performed by: NURSE PRACTITIONER

## 2024-11-06 PROCEDURE — 99232 SBSQ HOSP IP/OBS MODERATE 35: CPT | Performed by: FAMILY MEDICINE

## 2024-11-06 PROCEDURE — 2580000003 HC RX 258: Performed by: FAMILY MEDICINE

## 2024-11-06 PROCEDURE — 85025 COMPLETE CBC W/AUTO DIFF WBC: CPT

## 2024-11-06 PROCEDURE — 94640 AIRWAY INHALATION TREATMENT: CPT

## 2024-11-06 PROCEDURE — 80053 COMPREHEN METABOLIC PANEL: CPT

## 2024-11-06 RX ADMIN — VALSARTAN 80 MG: 160 TABLET, FILM COATED ORAL at 09:23

## 2024-11-06 RX ADMIN — SODIUM CHLORIDE: 9 INJECTION, SOLUTION INTRAVENOUS at 16:07

## 2024-11-06 RX ADMIN — DEXTROMETHORPHAN POLISTIREX 60 MG: 30 SUSPENSION ORAL at 22:28

## 2024-11-06 RX ADMIN — ROSUVASTATIN 20 MG: 20 TABLET, FILM COATED ORAL at 22:29

## 2024-11-06 RX ADMIN — LEVOTHYROXINE SODIUM 100 MCG: 50 TABLET ORAL at 06:11

## 2024-11-06 RX ADMIN — ALBUTEROL SULFATE 2.5 MG: 2.5 SOLUTION RESPIRATORY (INHALATION) at 16:12

## 2024-11-06 RX ADMIN — TRAZODONE HYDROCHLORIDE 100 MG: 100 TABLET ORAL at 22:29

## 2024-11-06 RX ADMIN — CARVEDILOL 6.25 MG: 3.12 TABLET, FILM COATED ORAL at 09:23

## 2024-11-06 RX ADMIN — AMPICILLIN SODIUM AND SULBACTAM SODIUM 3000 MG: 2; 1 INJECTION, POWDER, FOR SOLUTION INTRAMUSCULAR; INTRAVENOUS at 09:52

## 2024-11-06 RX ADMIN — DEXTROMETHORPHAN POLISTIREX 60 MG: 30 SUSPENSION ORAL at 09:23

## 2024-11-06 RX ADMIN — SODIUM CHLORIDE, PRESERVATIVE FREE 10 ML: 5 INJECTION INTRAVENOUS at 22:39

## 2024-11-06 RX ADMIN — CARVEDILOL 6.25 MG: 3.12 TABLET, FILM COATED ORAL at 17:33

## 2024-11-06 RX ADMIN — PANTOPRAZOLE SODIUM 40 MG: 40 TABLET, DELAYED RELEASE ORAL at 06:11

## 2024-11-06 RX ADMIN — ALTEPLASE 10 MG: 2.2 INJECTION, POWDER, LYOPHILIZED, FOR SOLUTION INTRAVENOUS at 16:04

## 2024-11-06 RX ADMIN — SODIUM CHLORIDE, PRESERVATIVE FREE 10 ML: 5 INJECTION INTRAVENOUS at 09:29

## 2024-11-06 RX ADMIN — INSULIN LISPRO 5 UNITS: 100 INJECTION, SOLUTION INTRAVENOUS; SUBCUTANEOUS at 13:25

## 2024-11-06 RX ADMIN — WATER 5 MG: 1 INJECTION INTRAMUSCULAR; INTRAVENOUS; SUBCUTANEOUS at 16:04

## 2024-11-06 RX ADMIN — AMPICILLIN SODIUM AND SULBACTAM SODIUM 3000 MG: 2; 1 INJECTION, POWDER, FOR SOLUTION INTRAMUSCULAR; INTRAVENOUS at 16:08

## 2024-11-06 RX ADMIN — AMLODIPINE BESYLATE 5 MG: 5 TABLET ORAL at 09:23

## 2024-11-06 RX ADMIN — AZITHROMYCIN DIHYDRATE 500 MG: 500 INJECTION, POWDER, LYOPHILIZED, FOR SOLUTION INTRAVENOUS at 00:22

## 2024-11-06 RX ADMIN — AMPICILLIN SODIUM AND SULBACTAM SODIUM 3000 MG: 2; 1 INJECTION, POWDER, FOR SOLUTION INTRAMUSCULAR; INTRAVENOUS at 03:51

## 2024-11-06 RX ADMIN — AMPICILLIN SODIUM AND SULBACTAM SODIUM 3000 MG: 2; 1 INJECTION, POWDER, FOR SOLUTION INTRAMUSCULAR; INTRAVENOUS at 22:34

## 2024-11-06 RX ADMIN — CITALOPRAM HYDROBROMIDE 20 MG: 20 TABLET ORAL at 09:23

## 2024-11-06 RX ADMIN — MELOXICAM 15 MG: 7.5 TABLET ORAL at 09:23

## 2024-11-06 ASSESSMENT — PAIN SCALES - GENERAL
PAINLEVEL_OUTOF10: 3
PAINLEVEL_OUTOF10: 3

## 2024-11-06 ASSESSMENT — PAIN DESCRIPTION - LOCATION
LOCATION: BACK
LOCATION: BACK

## 2024-11-06 ASSESSMENT — PAIN DESCRIPTION - ORIENTATION
ORIENTATION: RIGHT;UPPER
ORIENTATION: RIGHT

## 2024-11-06 ASSESSMENT — PAIN - FUNCTIONAL ASSESSMENT: PAIN_FUNCTIONAL_ASSESSMENT: ACTIVITIES ARE NOT PREVENTED

## 2024-11-06 ASSESSMENT — PAIN DESCRIPTION - DESCRIPTORS
DESCRIPTORS: ACHING;NAGGING
DESCRIPTORS: ACHING;THROBBING

## 2024-11-06 NOTE — PROGRESS NOTES
Pulmonary Progress Note  [facility] Pulmonary and Critical Care Specialists  Dr Lopez Lynch/Dr Jose Ramon Brewer/Dr Won ROJAS AGACNP-BC  Elisabet ROJAS NP-C      Patient - Clary Harris,  Age - 78 y.o.    - 1945      Room Number - 343/343-01   [facility] -  [id number]   [facility] # - [id number]  Date of Admission -  2024  9:06 PM        Consulting Service/Physician   Consulting - Luis Galvan MD  Primary Care Physician - Kandi Thompson,      SUBJECTIVE   Patient evaluated at bedside lying in bed in no acute distress.  She reports that her breathing is about the same as yesterday if no significant change following chest tube placements, but does note resolution of her right-sided chest pain.  States that she still feels fatigued, but has been able to eat and drink without difficulty and denies bowel/bladder dysfunction    Patient has been able to use her Breztri inhaler and notes that her expiratory wheezing is no longer as noticeable.  Denies having any significant bleeding or discomfort at site of chest tube.    OBJECTIVE   VITALS    height is 1.651 m (5' 5\") and weight is 83.9 kg (185 lb). Her oral temperature is 98.1 °F (36.7 °C). Her blood pressure is 149/95 (abnormal) and her pulse is 89. Her respiration is 22 and oxygen saturation is 95%.     Body mass index is 30.79 kg/m².  Temperature Range: Temp: 98.1 °F (36.7 °C) Temp  Av.1 °F (36.7 °C)  Min: 98.1 °F (36.7 °C)  Max: 98.2 °F (36.8 °C)  BP Range:  Systolic (24hrs), Av , Min:115 , Max:150     Diastolic (24hrs), Av, Min:79, Max:104    Pulse Range: Pulse  Av.6  Min: 77  Max: 90  Respiration Range: Resp  Av.2  Min: 17  Max: 34  Current Pulse Ox::  SpO2: 95 %  24HR Pulse Ox Range:  SpO2  Av.9 %  Min: 91 %  Max: 97 %  Oxygen Amount and Delivery: O2 Flow Rate (L/min): 4 L/min    Wt Readings from Last 3 Encounters:   24 83.9 kg (185 lb)  years but simply stopped using it and turned it back into the [employer], she did not recall the name of the [employer] leukocytosis related to pneumonia  Former tobacco use, quitting 30 years ago, 22 pack year history age 19 to age 41 approximately 1 pack/day she tells me  Full Code  PLAN:   Pleural debris with severe loculations with no free-flowing fluid on previous bedside US. Patient has chest tube in place with plan for intrapleural tPA and dornase treatment this afternoon.  Complete pleural fluid analysis, (Culture, cytology, LDH, pH, total protein, glucose, cell count with differential), following chest tube placement 30 mL of yellow drainage was obtained  Continue antibiotic therapy with Unasyn, will await culture results from pleural fluid  Consider home Breztri breathing treatments  Diabetes management per primary team    Electronically signed by Michael Lara DO on 11/6/2024 at 10:54 AM

## 2024-11-06 NOTE — CARE COORDINATION
CM spoke with patient discussed possible need for LTAC and SNF- provided list. Patient wants to discuss with daughter she will be here this evening,  will follow tomorrow.                      Post Acute Facility/Agency List     Provided patient  with the following list, the list includes the overall star ratings obtained from CMS per the Medicare Web site (www.Medicare.gov):     [x] Long Term Acute Care Facilities  [] Acute Inpatient Rehabilitation Facilities  [x] Skilled Nursing Facilities  [] Hospice Facilities  [] Home Care    Provided verbal instructions on how to utilize the QR Code to obtain additional detailed star ratings from www.Medicare.gov     offered to print and provide the detailed list:    []Accepted   [x]Declined      1315 Rec'd call from Nakita @ [facility] regarding referral,notified her no referral was sent,  that patient provided list of LTAC and SNF will  discuss with daughter this evening.  Will follow up in am..

## 2024-11-06 NOTE — PROGRESS NOTES
Occupational Therapy  Occupational Therapy Initial Evaluation  Facility/Department: [address]     Patient Name: Clary Harris        MRN: [id number]    : 1945    Date of Service: 2024    Discharge Recommendations  Discharge Recommendations: Defer OT at this time  OT Equipment Recommendations  Other: AE/DME recommnedations TBD    Chief Complaint   Patient presents with    Shortness of Breath    Chest Pain     Onset Wednesday    Chills     Past Medical History:  has a past medical history of COPD (chronic obstructive pulmonary disease) (HCC), Diverticulosis, DJD (degenerative joint disease), Hypertension, Hypertriglyceridemia, Hypothyroidism, Mild valvular heart disease, Osteoarthritis, Sleep apnea, Type 2 diabetes mellitus without complication (HCC), and Vertigo.  Past Surgical History:  has a past surgical history that includes Tubal ligation; Cholecystectomy; Colonoscopy; Breast biopsy; and IR CHEST TUBE INSERTION (2024).    Assessment  Performance deficits / Impairments: Decreased functional mobility ;Decreased endurance  Assessment: Pt seen for therapy eval s/p admission with pneumonia and chest tube placement on 24 . PLOF includes IND all ADLs and IADLs including driving with no use of AE/DME.  At this time, pt is IND ADL transfers, SUP functional mobility with no device, SUP toileting and appears to be safe to return to PLOF with assist as needed. Pt does not present with acute OT needs at this time and is to be discharge from OT services.  Prognosis: Good  Decision Making: Low Complexity  REQUIRES OT FOLLOW-UP: No  Activity Tolerance  Activity Tolerance: Patient Tolerated treatment well  Safety Devices  Type of Devices: Call light within reach;Gait belt;Left in chair;Nurse notified (RN aware pt is off chair alarm  Simultaneous filing. User may not have seen previous data.)  Restraints  Restraints Initially in Place: No (Simultaneous filing. User may not have seen previous

## 2024-11-06 NOTE — PROGRESS NOTES
[facility]  IN-PATIENT SERVICE   [facility] - Location: [place]    Progress Note    11/6/2024    10:15 AM    Name:   Clary Harris  MRN:     [id number]     Acct:      [id number]   Room:   [address] [place] [address] Day:  4  Admit Date:  11/2/2024  9:06 PM    PCP:   Kandi Thompson DO  Code Status:  Full Code    Subjective:     She is SP IR chest tube insertion yesterday at [facility].  She continue to c/o SOB and is on 4L.  She is getting up to use the bathroom. Some loose stools from antibiotics.  C/o a slight HA.  No CP.  No N/V.  Not the best appetite.     Medications:     Allergies:    Allergies   Allergen Reactions    Furosemide     Hydrochlorothiazide     Sulfa Antibiotics Hives, Other (See Comments) and Itching       Current Meds:   Scheduled Meds:    insulin lispro  3-15 Units SubCUTAneous TID WC    Budeson-Glycopyrrol-Formoterol  2 puff Inhalation BID    ampicillin-sulbactam  3,000 mg IntraVENous Q6H    ALTEplase (CATHFLO) 10 mg in sodium chloride 0.9 % 30 mL  10 mg IntraPLEUral Q12H    And    dornase alpha (PULMOZYME) 5 mg in sterile water 30 mL  5 mg IntraPLEUral Q12H    levothyroxine  100 mcg Oral Daily    meloxicam  15 mg Oral Daily    rosuvastatin  20 mg Oral Nightly    traZODone  100 mg Oral Nightly    dextromethorphan  60 mg Oral 2 times per day    sodium chloride  80 mL IntraVENous Once    sodium chloride flush  5-40 mL IntraVENous 2 times per day    [Held by provider] enoxaparin  40 mg SubCUTAneous Daily    azithromycin  500 mg IntraVENous Q24H    amLODIPine  5 mg Oral Daily    valsartan  80 mg Oral Daily    carvedilol  6.25 mg Oral BID WC    citalopram  20 mg Oral Daily    pantoprazole  40 mg Oral QAM AC     Continuous Infusions:    dextrose      sodium chloride 10 mL/hr at 11/05/24 0048     PRN Meds: glucose, dextrose bolus **OR** dextrose bolus, glucagon (rDNA), dextrose, albuterol, benzonatate, sodium chloride flush, sodium chloride, potassium chloride **OR** potassium  11/3/2024 Yes    Type 2 diabetes mellitus without complication (HCC) 11/3/2024 Yes    Right-sided chest pain 11/3/2024 Yes   4.2 cm thoracic aortic ectasia- OP follow up    [unfilled]      Plan:     Pneumonia right, loculated pleural debris- SP chest tube, on unasyn and zithromax, on 4L  COPD on Breztri  DM, hba1c 6.1, OP ozempic  HTN- BP control  She would consider SNF at [place] as she lives alone  PT/OT/labs ordered    Medical Decision Making: Ildefonso Thompson DO  11/6/2024  10:15 AM

## 2024-11-06 NOTE — PROGRESS NOTES
Physical Therapy  Facility/Department: [address]  Physical Therapy Initial Assessment    Name: Clary Harris  : 1945  MRN: [id number]  Date of Service: 2024    Chief Complaint   Patient presents with    Shortness of Breath    Chest Pain     Onset Wednesday    Chills      Discharge Recommendations:  No therapy recommended at discharge   PT Equipment Recommendations  Equipment Needed: No      Patient Diagnosis(es): The primary encounter diagnosis was Chest pain, unspecified type. Diagnoses of Dyspnea, unspecified type, Hypoxia, Pneumonia of both lower lobes due to infectious organism, and Chronic obstructive pulmonary disease, unspecified COPD type (HCC) were also pertinent to this visit.  Past Medical History:  has a past medical history of COPD (chronic obstructive pulmonary disease) (HCC), Diverticulosis, DJD (degenerative joint disease), Hypertension, Hypertriglyceridemia, Hypothyroidism, Mild valvular heart disease, Osteoarthritis, Sleep apnea, Type 2 diabetes mellitus without complication (HCC), and Vertigo.  Past Surgical History:  has a past surgical history that includes Tubal ligation; Cholecystectomy; Colonoscopy; Breast biopsy; and IR CHEST TUBE INSERTION (2024).    Assessment  Body Structures, Functions, Activity Limitations Requiring Skilled Therapeutic Intervention: Decreased functional mobility ;Decreased safe awareness;Decreased endurance    Assessment: Pt presents with community acquired pneumonia and currently has a chest tube. At baseline, pt lives alone and was independent gait no device, ind ADL's and all IADL's including yard work. Pt currently demonstrating independence with bed mobility, ind transfers, pt ambulated 18ft x 2 no device with supervision on 4L O2. Distance limited due to chest tube not allowed to be disconnected off wall suction. Pt demonstrates adequate safety for return to prior living situation. Pt will benefit from continued skilled PT while in the

## 2024-11-06 NOTE — PLAN OF CARE
Problem: Chronic Conditions and Co-morbidities  Goal: Patient's chronic conditions and co-morbidity symptoms are monitored and maintained or improved  11/5/2024 2334 by Janae Velasco RN  Outcome: Progressing  Flowsheets (Taken 11/5/2024 2100)  Care Plan - Patient's Chronic Conditions and Co-Morbidity Symptoms are Monitored and Maintained or Improved: Monitor and assess patient's chronic conditions and comorbid symptoms for stability, deterioration, or improvement     Problem: Discharge Planning  Goal: Discharge to home or other facility with appropriate resources  11/5/2024 2334 by Janae Velasco RN  Outcome: Progressing  Flowsheets (Taken 11/5/2024 2100)  Discharge to home or other facility with appropriate resources: Identify barriers to discharge with patient and caregiver     Problem: Pain  Goal: Verbalizes/displays adequate comfort level or baseline comfort level  11/5/2024 2334 by Janae Velasco RN  Outcome: Progressing  Flowsheets (Taken 11/5/2024 2300)  Verbalizes/displays adequate comfort level or baseline comfort level:   Encourage patient to monitor pain and request assistance   Administer analgesics based on type and severity of pain and evaluate response   Assess pain using appropriate pain scale   Implement non-pharmacological measures as appropriate and evaluate response     Problem: Respiratory - Adult  Goal: Achieves optimal ventilation and oxygenation  11/5/2024 2334 by Janae Velasco RN  Outcome: Progressing  Flowsheets (Taken 11/5/2024 2100)  Achieves optimal ventilation and oxygenation:   Assess for changes in respiratory status   Assess for changes in mentation and behavior   Position to facilitate oxygenation and minimize respiratory effort   Oxygen supplementation based on oxygen saturation or arterial blood gases   Assess and instruct to report shortness of breath or any respiratory difficulty     Problem: Safety - Adult  Goal: Free from fall injury  11/5/2024 2334 by Janae Velasco

## 2024-11-06 NOTE — PROCEDURES
PROCEDURE NOTE  Date: 11/6/2024   Name: Clary Harris  YOB: 1945    Procedures  Intrapleural tPA and dornase instillation right-sided    Patient laid in lateral decubitus position.  Patient has a small bore chest tube with three-way stopcock.  I did find some resistance and had to aspirate with a saline syringe and instill to get the chest tube to be free of blockage.  Once this occurred I was able to freely instill both the tPA and dornase without resistance.  Patient had a slight amount of chest pressure during the instillation process but otherwise tolerated procedure very well.  Chest tube will currently remain clamped until 5 PM.  I gave instructions directly to the nurse to place the chest tube back to suction at 5 PM.  We will see the response and repeat the process again tomorrow.    No complications    Jose Eduardo Brewer MD  Pulmonary and Critical Care  [phone] [employer]  [phone] Cell

## 2024-11-07 ENCOUNTER — APPOINTMENT (OUTPATIENT)
Dept: ULTRASOUND IMAGING | Age: 79
DRG: 193 | End: 2024-11-07
Attending: FAMILY MEDICINE
Payer: MEDICARE

## 2024-11-07 LAB
ALBUMIN SERPL-MCNC: 2.8 G/DL (ref 3.5–5.2)
ALBUMIN/GLOB SERPL: 0.8 {RATIO} (ref 1–2.5)
ALP SERPL-CCNC: 171 U/L (ref 35–104)
ALT SERPL-CCNC: 163 U/L (ref 5–33)
ANION GAP SERPL CALCULATED.3IONS-SCNC: 6 MMOL/L (ref 9–17)
AST SERPL-CCNC: 225 U/L
BASOPHILS # BLD: 0 K/UL (ref 0–0.2)
BASOPHILS NFR BLD: 0 % (ref 0–2)
BILIRUB SERPL-MCNC: 0.4 MG/DL (ref 0.3–1.2)
BUN SERPL-MCNC: 16 MG/DL (ref 8–23)
CALCIUM SERPL-MCNC: 8.9 MG/DL (ref 8.6–10.4)
CASE NUMBER:: NORMAL
CHLORIDE SERPL-SCNC: 107 MMOL/L (ref 98–107)
CO2 SERPL-SCNC: 32 MMOL/L (ref 20–31)
CREAT SERPL-MCNC: 0.7 MG/DL (ref 0.5–0.9)
EOSINOPHIL # BLD: 0.1 K/UL (ref 0–0.4)
EOSINOPHILS RELATIVE PERCENT: 1 % (ref 1–4)
ERYTHROCYTE [DISTWIDTH] IN BLOOD BY AUTOMATED COUNT: 15.2 % (ref 12.5–15.4)
GFR, ESTIMATED: 89 ML/MIN/1.73M2
GLUCOSE BLD-MCNC: 119 MG/DL (ref 65–105)
GLUCOSE BLD-MCNC: 156 MG/DL (ref 65–105)
GLUCOSE BLD-MCNC: 169 MG/DL (ref 65–105)
GLUCOSE BLD-MCNC: 170 MG/DL (ref 65–105)
GLUCOSE FLD-MCNC: 126 MG/DL
GLUCOSE SERPL-MCNC: 130 MG/DL (ref 70–99)
HCT VFR BLD AUTO: 36.5 % (ref 36–46)
HGB BLD-MCNC: 11.9 G/DL (ref 12–16)
LDH FLD L TO P-CCNC: 867 U/L
LYMPHOCYTES NFR BLD: 1.3 K/UL (ref 1–4.8)
LYMPHOCYTES RELATIVE PERCENT: 11 % (ref 24–44)
MCH RBC QN AUTO: 28.3 PG (ref 26–34)
MCHC RBC AUTO-ENTMCNC: 32.6 G/DL (ref 31–37)
MCV RBC AUTO: 86.9 FL (ref 80–100)
MONOCYTES NFR BLD: 1.3 K/UL (ref 0.1–1.2)
MONOCYTES NFR BLD: 12 % (ref 2–11)
NEUTROPHILS NFR BLD: 76 % (ref 36–66)
NEUTS SEG NFR BLD: 8.3 K/UL (ref 1.8–7.7)
PLATELET # BLD AUTO: 349 K/UL (ref 140–450)
PMV BLD AUTO: 7.4 FL (ref 6–12)
POTASSIUM SERPL-SCNC: 3.8 MMOL/L (ref 3.7–5.3)
PROT FLD-MCNC: 4 G/DL
PROT SERPL-MCNC: 6.1 G/DL (ref 6.4–8.3)
RBC # BLD AUTO: 4.2 M/UL (ref 4–5.2)
SODIUM SERPL-SCNC: 145 MMOL/L (ref 135–144)
SPECIMEN DESCRIPTION: NORMAL
SPECIMEN TYPE: NORMAL
WBC OTHER # BLD: 11 K/UL (ref 3.5–11)

## 2024-11-07 PROCEDURE — 88112 CYTOPATH CELL ENHANCE TECH: CPT

## 2024-11-07 PROCEDURE — 2060000000 HC ICU INTERMEDIATE R&B

## 2024-11-07 PROCEDURE — 36415 COLL VENOUS BLD VENIPUNCTURE: CPT

## 2024-11-07 PROCEDURE — 80053 COMPREHEN METABOLIC PANEL: CPT

## 2024-11-07 PROCEDURE — 2700000000 HC OXYGEN THERAPY PER DAY

## 2024-11-07 PROCEDURE — 2580000003 HC RX 258

## 2024-11-07 PROCEDURE — 88305 TISSUE EXAM BY PATHOLOGIST: CPT

## 2024-11-07 PROCEDURE — 6360000002 HC RX W HCPCS: Performed by: FAMILY MEDICINE

## 2024-11-07 PROCEDURE — 3E0L317 INTRODUCTION OF OTHER THROMBOLYTIC INTO PLEURAL CAVITY, PERCUTANEOUS APPROACH: ICD-10-PCS | Performed by: INTERNAL MEDICINE

## 2024-11-07 PROCEDURE — 6370000000 HC RX 637 (ALT 250 FOR IP): Performed by: NURSE PRACTITIONER

## 2024-11-07 PROCEDURE — 6370000000 HC RX 637 (ALT 250 FOR IP): Performed by: FAMILY MEDICINE

## 2024-11-07 PROCEDURE — 76705 ECHO EXAM OF ABDOMEN: CPT

## 2024-11-07 PROCEDURE — 99233 SBSQ HOSP IP/OBS HIGH 50: CPT | Performed by: FAMILY MEDICINE

## 2024-11-07 PROCEDURE — 2580000003 HC RX 258: Performed by: FAMILY MEDICINE

## 2024-11-07 PROCEDURE — 85025 COMPLETE CBC W/AUTO DIFF WBC: CPT

## 2024-11-07 PROCEDURE — 82947 ASSAY GLUCOSE BLOOD QUANT: CPT

## 2024-11-07 PROCEDURE — 6360000002 HC RX W HCPCS

## 2024-11-07 PROCEDURE — 94761 N-INVAS EAR/PLS OXIMETRY MLT: CPT

## 2024-11-07 RX ADMIN — ALTEPLASE 10 MG: 2.2 INJECTION, POWDER, LYOPHILIZED, FOR SOLUTION INTRAVENOUS at 16:02

## 2024-11-07 RX ADMIN — CARVEDILOL 6.25 MG: 3.12 TABLET, FILM COATED ORAL at 10:44

## 2024-11-07 RX ADMIN — LEVOTHYROXINE SODIUM 100 MCG: 50 TABLET ORAL at 06:40

## 2024-11-07 RX ADMIN — CITALOPRAM HYDROBROMIDE 20 MG: 20 TABLET ORAL at 10:34

## 2024-11-07 RX ADMIN — AMLODIPINE BESYLATE 5 MG: 5 TABLET ORAL at 10:34

## 2024-11-07 RX ADMIN — DEXTROMETHORPHAN POLISTIREX 60 MG: 30 SUSPENSION ORAL at 10:44

## 2024-11-07 RX ADMIN — AMPICILLIN SODIUM AND SULBACTAM SODIUM 3000 MG: 2; 1 INJECTION, POWDER, FOR SOLUTION INTRAMUSCULAR; INTRAVENOUS at 10:49

## 2024-11-07 RX ADMIN — PANTOPRAZOLE SODIUM 40 MG: 40 TABLET, DELAYED RELEASE ORAL at 06:40

## 2024-11-07 RX ADMIN — AZITHROMYCIN DIHYDRATE 500 MG: 500 INJECTION, POWDER, LYOPHILIZED, FOR SOLUTION INTRAVENOUS at 00:10

## 2024-11-07 RX ADMIN — MELOXICAM 15 MG: 7.5 TABLET ORAL at 10:34

## 2024-11-07 RX ADMIN — CARVEDILOL 6.25 MG: 3.12 TABLET, FILM COATED ORAL at 18:50

## 2024-11-07 RX ADMIN — AMPICILLIN SODIUM AND SULBACTAM SODIUM 3000 MG: 2; 1 INJECTION, POWDER, FOR SOLUTION INTRAMUSCULAR; INTRAVENOUS at 03:18

## 2024-11-07 RX ADMIN — TRAZODONE HYDROCHLORIDE 100 MG: 100 TABLET ORAL at 21:05

## 2024-11-07 RX ADMIN — VALSARTAN 80 MG: 160 TABLET, FILM COATED ORAL at 10:34

## 2024-11-07 RX ADMIN — ACETAMINOPHEN 650 MG: 325 TABLET ORAL at 21:05

## 2024-11-07 RX ADMIN — DEXTROMETHORPHAN POLISTIREX 60 MG: 30 SUSPENSION ORAL at 21:10

## 2024-11-07 RX ADMIN — AMPICILLIN SODIUM AND SULBACTAM SODIUM 3000 MG: 2; 1 INJECTION, POWDER, FOR SOLUTION INTRAMUSCULAR; INTRAVENOUS at 21:14

## 2024-11-07 RX ADMIN — ROSUVASTATIN 20 MG: 20 TABLET, FILM COATED ORAL at 21:06

## 2024-11-07 RX ADMIN — AMPICILLIN SODIUM AND SULBACTAM SODIUM 3000 MG: 2; 1 INJECTION, POWDER, FOR SOLUTION INTRAMUSCULAR; INTRAVENOUS at 15:52

## 2024-11-07 RX ADMIN — SODIUM CHLORIDE, PRESERVATIVE FREE 10 ML: 5 INJECTION INTRAVENOUS at 10:34

## 2024-11-07 RX ADMIN — SODIUM CHLORIDE, PRESERVATIVE FREE 10 ML: 5 INJECTION INTRAVENOUS at 21:07

## 2024-11-07 RX ADMIN — WATER 5 MG: 1 INJECTION INTRAMUSCULAR; INTRAVENOUS; SUBCUTANEOUS at 16:03

## 2024-11-07 ASSESSMENT — PAIN SCALES - GENERAL
PAINLEVEL_OUTOF10: 1
PAINLEVEL_OUTOF10: 3

## 2024-11-07 ASSESSMENT — PAIN DESCRIPTION - DESCRIPTORS: DESCRIPTORS: ACHING

## 2024-11-07 ASSESSMENT — PAIN DESCRIPTION - ORIENTATION: ORIENTATION: RIGHT;UPPER

## 2024-11-07 ASSESSMENT — PAIN DESCRIPTION - LOCATION: LOCATION: BACK

## 2024-11-07 ASSESSMENT — PAIN - FUNCTIONAL ASSESSMENT: PAIN_FUNCTIONAL_ASSESSMENT: PREVENTS OR INTERFERES SOME ACTIVE ACTIVITIES AND ADLS

## 2024-11-07 NOTE — PLAN OF CARE
Problem: Chronic Conditions and Co-morbidities  Goal: Patient's chronic conditions and co-morbidity symptoms are monitored and maintained or improved  11/6/2024 2312 by Merari Soto RN  Outcome: Progressing  11/6/2024 2100 by Lula Rayo RN  Outcome: Progressing  Flowsheets (Taken 11/6/2024 0810)  Care Plan - Patient's Chronic Conditions and Co-Morbidity Symptoms are Monitored and Maintained or Improved:   Monitor and assess patient's chronic conditions and comorbid symptoms for stability, deterioration, or improvement   Collaborate with multidisciplinary team to address chronic and comorbid conditions and prevent exacerbation or deterioration   Update acute care plan with appropriate goals if chronic or comorbid symptoms are exacerbated and prevent overall improvement and discharge     Problem: Discharge Planning  Goal: Discharge to home or other facility with appropriate resources  11/6/2024 2312 by Merari Soto RN  Outcome: Progressing  11/6/2024 2100 by Lula Rayo RN  Outcome: Progressing  Flowsheets (Taken 11/6/2024 0810)  Discharge to home or other facility with appropriate resources:   Identify barriers to discharge with patient and caregiver   Arrange for needed discharge resources and transportation as appropriate   Identify discharge learning needs (meds, wound care, etc)   Refer to discharge planning if patient needs post-hospital services based on physician order or complex needs related to functional status, cognitive ability or social support system     Problem: Pain  Goal: Verbalizes/displays adequate comfort level or baseline comfort level  11/6/2024 2312 by Mreari Soto RN  Outcome: Progressing  11/6/2024 2100 by Lula Rayo RN  Outcome: Progressing  Flowsheets  Taken 11/6/2024 1715  Verbalizes/displays adequate comfort level or baseline comfort level:   Encourage patient to monitor pain and request assistance   Assess pain using appropriate pain scale    RN  Outcome: Progressing  11/6/2024 2100 by Lula Rayo RN  Outcome: Progressing  Flowsheets (Taken 11/6/2024 0810)  Achieves stable or improved neurological status:   Assess for and report changes in neurological status   Initiate measures to prevent increased intracranial pressure     Problem: Cardiovascular - Adult  Goal: Maintains optimal cardiac output and hemodynamic stability  11/6/2024 2312 by Merari Soto RN  Outcome: Progressing  11/6/2024 2100 by Lula Rayo RN  Outcome: Progressing  Flowsheets (Taken 11/6/2024 0810)  Maintains optimal cardiac output and hemodynamic stability:   Monitor blood pressure and heart rate   Monitor urine output and notify Licensed Independent Practitioner for values outside of normal range   Assess for signs of decreased cardiac output     Problem: Skin/Tissue Integrity - Adult  Goal: Skin integrity remains intact  11/6/2024 2312 by Merari Soto RN  Outcome: Progressing  11/6/2024 2100 by Lula Rayo RN  Outcome: Progressing  Flowsheets (Taken 11/6/2024 0810)  Skin Integrity Remains Intact: Monitor for areas of redness and/or skin breakdown     Problem: Musculoskeletal - Adult  Goal: Return mobility to safest level of function  11/6/2024 2312 by Merari Soto RN  Outcome: Progressing  11/6/2024 2100 by Lula Rayo RN  Outcome: Progressing  Flowsheets (Taken 11/6/2024 0810)  Return Mobility to Safest Level of Function: Assess patient stability and activity tolerance for standing, transferring and ambulating with or without assistive devices  Goal: Maintain proper alignment of affected body part  11/6/2024 2312 by Merari Soto RN  Outcome: Progressing  11/6/2024 2100 by Lula Rayo RN  Outcome: Progressing     Problem: Gastrointestinal - Adult  Goal: Minimal or absence of nausea and vomiting  11/6/2024 2312 by Merari Soto RN  Outcome: Progressing  11/6/2024 2100 by Lula Rayo RN  Outcome: Progressing  Flowsheets (Taken

## 2024-11-07 NOTE — PLAN OF CARE
Problem: Chronic Conditions and Co-morbidities  Goal: Patient's chronic conditions and co-morbidity symptoms are monitored and maintained or improved  Outcome: Progressing  Flowsheets  Taken 11/7/2024 1130  Care Plan - Patient's Chronic Conditions and Co-Morbidity Symptoms are Monitored and Maintained or Improved:   Monitor and assess patient's chronic conditions and comorbid symptoms for stability, deterioration, or improvement   Collaborate with multidisciplinary team to address chronic and comorbid conditions and prevent exacerbation or deterioration   Update acute care plan with appropriate goals if chronic or comorbid symptoms are exacerbated and prevent overall improvement and discharge  Taken 11/7/2024 0834  Care Plan - Patient's Chronic Conditions and Co-Morbidity Symptoms are Monitored and Maintained or Improved:   Monitor and assess patient's chronic conditions and comorbid symptoms for stability, deterioration, or improvement   Collaborate with multidisciplinary team to address chronic and comorbid conditions and prevent exacerbation or deterioration   Update acute care plan with appropriate goals if chronic or comorbid symptoms are exacerbated and prevent overall improvement and discharge     Problem: Discharge Planning  Goal: Discharge to home or other facility with appropriate resources  Outcome: Progressing  Flowsheets  Taken 11/7/2024 1130  Discharge to home or other facility with appropriate resources:   Identify barriers to discharge with patient and caregiver   Arrange for needed discharge resources and transportation as appropriate   Identify discharge learning needs (meds, wound care, etc)   Refer to discharge planning if patient needs post-hospital services based on physician order or complex needs related to functional status, cognitive ability or social support system  Taken 11/7/2024 0834  Discharge to home or other facility with appropriate resources:   Identify barriers to discharge with  ambulation using safe patient handling equipment as needed     Problem: Musculoskeletal - Adult  Goal: Maintain proper alignment of affected body part  Outcome: Progressing     Problem: Gastrointestinal - Adult  Goal: Minimal or absence of nausea and vomiting  Outcome: Progressing  Flowsheets  Taken 11/7/2024 1130  Minimal or absence of nausea and vomiting: Administer IV fluids as ordered to ensure adequate hydration  Taken 11/7/2024 0834  Minimal or absence of nausea and vomiting: Administer IV fluids as ordered to ensure adequate hydration     Problem: Infection - Adult  Goal: Absence of infection at discharge  Outcome: Progressing  Flowsheets  Taken 11/7/2024 1130  Absence of infection at discharge: Assess and monitor for signs and symptoms of infection  Taken 11/7/2024 0834  Absence of infection at discharge:   Assess and monitor for signs and symptoms of infection   Monitor lab/diagnostic results     Problem: Metabolic/Fluid and Electrolytes - Adult  Goal: Electrolytes maintained within normal limits  Outcome: Progressing  Flowsheets  Taken 11/7/2024 1130  Electrolytes maintained within normal limits: Monitor labs and assess patient for signs and symptoms of electrolyte imbalances  Taken 11/7/2024 0834  Electrolytes maintained within normal limits:   Monitor labs and assess patient for signs and symptoms of electrolyte imbalances   Administer electrolyte replacement as ordered     Problem: Hematologic - Adult  Goal: Maintains hematologic stability  Outcome: Progressing  Flowsheets  Taken 11/7/2024 1130  Maintains hematologic stability: Assess for signs and symptoms of bleeding or hemorrhage  Taken 11/7/2024 0834  Maintains hematologic stability: Assess for signs and symptoms of bleeding or hemorrhage

## 2024-11-07 NOTE — PROGRESS NOTES
[facility]  IN-PATIENT SERVICE   [facility] - Location: [place]    Progress Note    11/7/2024    9:28 AM    Name:   Clary Harris  MRN:     [id number]     Acct:      [id number]   Room:   [address] [place] [address] Day:  5  Admit Date:  11/2/2024  9:06 PM    PCP:   Kandi Thompson DO  Code Status:  Full Code    Subjective:     Patient feeling better after chest tube placement.  She is coughing productive of scant amounts of sputum.  Denies fevers or chills.  She states she is eating well.  Denies shortness of breath or chest pain.    Medications:     Allergies:    Allergies   Allergen Reactions    Furosemide     Hydrochlorothiazide     Sulfa Antibiotics Hives, Other (See Comments) and Itching       Current Meds:   Scheduled Meds:    insulin lispro  3-15 Units SubCUTAneous TID WC    Budeson-Glycopyrrol-Formoterol  2 puff Inhalation BID    ampicillin-sulbactam  3,000 mg IntraVENous Q6H    ALTEplase (CATHFLO) 10 mg in sodium chloride 0.9 % 30 mL  10 mg IntraPLEUral Q12H    And    dornase alpha (PULMOZYME) 5 mg in sterile water 30 mL  5 mg IntraPLEUral Q12H    levothyroxine  100 mcg Oral Daily    meloxicam  15 mg Oral Daily    rosuvastatin  20 mg Oral Nightly    traZODone  100 mg Oral Nightly    dextromethorphan  60 mg Oral 2 times per day    sodium chloride  80 mL IntraVENous Once    sodium chloride flush  5-40 mL IntraVENous 2 times per day    [Held by provider] enoxaparin  40 mg SubCUTAneous Daily    amLODIPine  5 mg Oral Daily    valsartan  80 mg Oral Daily    carvedilol  6.25 mg Oral BID WC    citalopram  20 mg Oral Daily    pantoprazole  40 mg Oral QAM AC     Continuous Infusions:    dextrose      sodium chloride Stopped (11/07/24 0433)     PRN Meds: glucose, dextrose bolus **OR** dextrose bolus, glucagon (rDNA), dextrose, albuterol, benzonatate, sodium chloride flush, sodium chloride, potassium chloride **OR** potassium alternative oral replacement **OR** potassium chloride, magnesium

## 2024-11-07 NOTE — CARE COORDINATION
CM spoke with patient regarding transitional need,, her goal is to return home has multiple family members to assist with care. Explained to patient if chest requires a provide to administer medication flushes she would require a high level of care.  Patient requests referral be sent to [facility], referral sent.

## 2024-11-07 NOTE — PLAN OF CARE
Problem: Chronic Conditions and Co-morbidities  Goal: Patient's chronic conditions and co-morbidity symptoms are monitored and maintained or improved  Outcome: Progressing  Flowsheets (Taken 11/6/2024 0810)  Care Plan - Patient's Chronic Conditions and Co-Morbidity Symptoms are Monitored and Maintained or Improved:   Monitor and assess patient's chronic conditions and comorbid symptoms for stability, deterioration, or improvement   Collaborate with multidisciplinary team to address chronic and comorbid conditions and prevent exacerbation or deterioration   Update acute care plan with appropriate goals if chronic or comorbid symptoms are exacerbated and prevent overall improvement and discharge     Problem: Discharge Planning  Goal: Discharge to home or other facility with appropriate resources  Outcome: Progressing  Flowsheets (Taken 11/6/2024 0810)  Discharge to home or other facility with appropriate resources:   Identify barriers to discharge with patient and caregiver   Arrange for needed discharge resources and transportation as appropriate   Identify discharge learning needs (meds, wound care, etc)   Refer to discharge planning if patient needs post-hospital services based on physician order or complex needs related to functional status, cognitive ability or social support system     Problem: Pain  Goal: Verbalizes/displays adequate comfort level or baseline comfort level  Outcome: Progressing  Flowsheets  Taken 11/6/2024 1715  Verbalizes/displays adequate comfort level or baseline comfort level:   Encourage patient to monitor pain and request assistance   Assess pain using appropriate pain scale   Administer analgesics based on type and severity of pain and evaluate response   Implement non-pharmacological measures as appropriate and evaluate response   Notify Licensed Independent Practitioner if interventions unsuccessful or patient reports new pain  Taken 11/6/2024 1130  Verbalizes/displays adequate  comfort level or baseline comfort level:   Encourage patient to monitor pain and request assistance   Assess pain using appropriate pain scale   Administer analgesics based on type and severity of pain and evaluate response   Implement non-pharmacological measures as appropriate and evaluate response   Notify Licensed Independent Practitioner if interventions unsuccessful or patient reports new pain  Taken 11/6/2024 0746  Verbalizes/displays adequate comfort level or baseline comfort level:   Encourage patient to monitor pain and request assistance   Assess pain using appropriate pain scale   Administer analgesics based on type and severity of pain and evaluate response   Implement non-pharmacological measures as appropriate and evaluate response   Notify Licensed Independent Practitioner if interventions unsuccessful or patient reports new pain     Problem: Respiratory - Adult  Goal: Achieves optimal ventilation and oxygenation  Outcome: Progressing  Flowsheets (Taken 11/6/2024 0810)  Achieves optimal ventilation and oxygenation:   Assess for changes in respiratory status   Assess for changes in mentation and behavior   Position to facilitate oxygenation and minimize respiratory effort   Oxygen supplementation based on oxygen saturation or arterial blood gases   Assess and instruct to report shortness of breath or any respiratory difficulty   Respiratory therapy support as indicated     Problem: Safety - Adult  Goal: Free from fall injury  Outcome: Progressing     Problem: Neurosensory - Adult  Goal: Achieves stable or improved neurological status  Outcome: Progressing  Flowsheets (Taken 11/6/2024 0810)  Achieves stable or improved neurological status:   Assess for and report changes in neurological status   Initiate measures to prevent increased intracranial pressure     Problem: Cardiovascular - Adult  Goal: Maintains optimal cardiac output and hemodynamic stability  Outcome: Progressing  Flowsheets (Taken

## 2024-11-07 NOTE — PROGRESS NOTES
Spiritual Health History and Assessment/Progress Note  [facility]    (P) Initial Encounter, (P) Follow up,  ,      Name: Clary Harris MRN: [id number]    Age: 78 y.o.     Sex: female   Language: English   Episcopalian: Alevism   Pneumonia due to infectious agent     Date: 11/7/2024            Total Time Calculated: (P) 10 min              Spiritual Assessment began in [facility]        Referral/Consult From: (P) Rounding   Encounter Overview/Reason: (P) Initial Encounter  Service Provided For: (P) Patient    PT was eating lunch when arrived.  PT has a good appetite.  PT is feeling much better than she did a few days ago.  PT has tremendous family support.  PT is peaceful and comfortable.  Doing well.   prayed with PT before leaving.    Paula, Belief, Meaning:   Patient identifies as spiritual, is connected with a paula tradition or spiritual practice, and has beliefs or practices that help with coping during difficult times  Family/Friends No family/friends present      Importance and Influence:  Patient has spiritual/personal beliefs that influence decisions regarding their health  Family/Friends No family/friends present    Community:  Patient is connected with a spiritual community and feels well-supported. Support system includes: Children and Extended family  Family/Friends No family/friends present    Assessment and Plan of Care:     Patient Interventions include: Facilitated expression of thoughts and feelings  Family/Friends Interventions include: No family/friends present    Patient Plan of Care: Contact Paula  for support or sacramental needs  Family/Friends Plan of Care: No family/friends present    Electronically signed by Chaplain Macario Intern on 11/7/2024 at 12:52 PM

## 2024-11-07 NOTE — PROCEDURES
PROCEDURE NOTE  Date: 11/7/2024   Name: Clary Harris  YOB: 1945    Procedures    Procedures  Intrapleural tPA and dornase instillation right-sided     Patient laid in lateral decubitus position.  Patient has a small bore chest tube with three-way stopcock. I was able to freely instill both the tPA and dornase without resistance.  Patient tolerated procedure very well.  Chest tube will currently remain clamped until 5 PM.  I gave instructions directly to the nurse to place the chest tube back to suction at 5 PM.  We will see the response and repeat the process again tomorrow.     No complications     Jose Eduardo Brewer MD  Pulmonary and Critical Care  [phone] [employer]  [phone] Cell

## 2024-11-08 LAB
ALBUMIN SERPL-MCNC: 2.8 G/DL (ref 3.5–5.2)
ALBUMIN/GLOB SERPL: 0.8 {RATIO} (ref 1–2.5)
ALP SERPL-CCNC: 188 U/L (ref 35–104)
ALT SERPL-CCNC: 167 U/L (ref 5–33)
ANION GAP SERPL CALCULATED.3IONS-SCNC: 6 MMOL/L (ref 9–17)
AST SERPL-CCNC: 158 U/L
BASOPHILS # BLD: 0.1 K/UL (ref 0–0.2)
BASOPHILS NFR BLD: 1 % (ref 0–2)
BILIRUB SERPL-MCNC: 0.4 MG/DL (ref 0.3–1.2)
BUN SERPL-MCNC: 16 MG/DL (ref 8–23)
CALCIUM SERPL-MCNC: 8.9 MG/DL (ref 8.6–10.4)
CHLORIDE SERPL-SCNC: 104 MMOL/L (ref 98–107)
CO2 SERPL-SCNC: 35 MMOL/L (ref 20–31)
CREAT SERPL-MCNC: 0.7 MG/DL (ref 0.5–0.9)
EOSINOPHIL # BLD: 0.1 K/UL (ref 0–0.4)
EOSINOPHILS RELATIVE PERCENT: 1 % (ref 1–4)
ERYTHROCYTE [DISTWIDTH] IN BLOOD BY AUTOMATED COUNT: 15.8 % (ref 12.5–15.4)
GFR, ESTIMATED: 89 ML/MIN/1.73M2
GLUCOSE BLD-MCNC: 120 MG/DL (ref 65–105)
GLUCOSE BLD-MCNC: 143 MG/DL (ref 65–105)
GLUCOSE BLD-MCNC: 149 MG/DL (ref 65–105)
GLUCOSE BLD-MCNC: 172 MG/DL (ref 65–105)
GLUCOSE SERPL-MCNC: 117 MG/DL (ref 70–99)
HCT VFR BLD AUTO: 36.7 % (ref 36–46)
HGB BLD-MCNC: 12.2 G/DL (ref 12–16)
LYMPHOCYTES NFR BLD: 1.2 K/UL (ref 1–4.8)
LYMPHOCYTES RELATIVE PERCENT: 11 % (ref 24–44)
MCH RBC QN AUTO: 29 PG (ref 26–34)
MCHC RBC AUTO-ENTMCNC: 33.4 G/DL (ref 31–37)
MCV RBC AUTO: 86.8 FL (ref 80–100)
MICROORGANISM SPEC CULT: NORMAL
MICROORGANISM SPEC CULT: NORMAL
MONOCYTES NFR BLD: 1.3 K/UL (ref 0.1–1.2)
MONOCYTES NFR BLD: 12 % (ref 2–11)
MRSA, DNA, NASAL: NEGATIVE
NEUTROPHILS NFR BLD: 75 % (ref 36–66)
NEUTS SEG NFR BLD: 8.1 K/UL (ref 1.8–7.7)
PLATELET # BLD AUTO: 365 K/UL (ref 140–450)
PMV BLD AUTO: 7.7 FL (ref 6–12)
POTASSIUM SERPL-SCNC: 3.4 MMOL/L (ref 3.7–5.3)
PROT SERPL-MCNC: 6.4 G/DL (ref 6.4–8.3)
RBC # BLD AUTO: 4.22 M/UL (ref 4–5.2)
SERVICE CMNT-IMP: NORMAL
SERVICE CMNT-IMP: NORMAL
SODIUM SERPL-SCNC: 145 MMOL/L (ref 135–144)
SPECIMEN DESCRIPTION: NORMAL
SURGICAL PATHOLOGY REPORT: NORMAL
WBC OTHER # BLD: 10.8 K/UL (ref 3.5–11)

## 2024-11-08 PROCEDURE — 6360000002 HC RX W HCPCS

## 2024-11-08 PROCEDURE — 6370000000 HC RX 637 (ALT 250 FOR IP): Performed by: FAMILY MEDICINE

## 2024-11-08 PROCEDURE — 85025 COMPLETE CBC W/AUTO DIFF WBC: CPT

## 2024-11-08 PROCEDURE — 2580000003 HC RX 258

## 2024-11-08 PROCEDURE — 6360000002 HC RX W HCPCS: Performed by: FAMILY MEDICINE

## 2024-11-08 PROCEDURE — 97116 GAIT TRAINING THERAPY: CPT

## 2024-11-08 PROCEDURE — 2060000000 HC ICU INTERMEDIATE R&B

## 2024-11-08 PROCEDURE — 6360000002 HC RX W HCPCS: Performed by: INTERNAL MEDICINE

## 2024-11-08 PROCEDURE — 36415 COLL VENOUS BLD VENIPUNCTURE: CPT

## 2024-11-08 PROCEDURE — 82947 ASSAY GLUCOSE BLOOD QUANT: CPT

## 2024-11-08 PROCEDURE — 2580000003 HC RX 258: Performed by: FAMILY MEDICINE

## 2024-11-08 PROCEDURE — 2580000003 HC RX 258: Performed by: INTERNAL MEDICINE

## 2024-11-08 PROCEDURE — 32551 INSERTION OF CHEST TUBE: CPT

## 2024-11-08 PROCEDURE — 99232 SBSQ HOSP IP/OBS MODERATE 35: CPT | Performed by: FAMILY MEDICINE

## 2024-11-08 PROCEDURE — 80053 COMPREHEN METABOLIC PANEL: CPT

## 2024-11-08 PROCEDURE — 6370000000 HC RX 637 (ALT 250 FOR IP): Performed by: NURSE PRACTITIONER

## 2024-11-08 RX ORDER — METRONIDAZOLE 500 MG/100ML
500 INJECTION, SOLUTION INTRAVENOUS EVERY 8 HOURS
Status: DISCONTINUED | OUTPATIENT
Start: 2024-11-08 | End: 2024-11-20

## 2024-11-08 RX ADMIN — ACETAMINOPHEN 650 MG: 325 TABLET ORAL at 20:48

## 2024-11-08 RX ADMIN — AMLODIPINE BESYLATE 5 MG: 5 TABLET ORAL at 09:05

## 2024-11-08 RX ADMIN — METRONIDAZOLE 500 MG: 500 INJECTION, SOLUTION INTRAVENOUS at 20:53

## 2024-11-08 RX ADMIN — Medication 1000 MG: at 13:21

## 2024-11-08 RX ADMIN — PANTOPRAZOLE SODIUM 40 MG: 40 TABLET, DELAYED RELEASE ORAL at 05:11

## 2024-11-08 RX ADMIN — VALSARTAN 80 MG: 160 TABLET, FILM COATED ORAL at 09:05

## 2024-11-08 RX ADMIN — CARVEDILOL 6.25 MG: 3.12 TABLET, FILM COATED ORAL at 09:05

## 2024-11-08 RX ADMIN — AMPICILLIN SODIUM AND SULBACTAM SODIUM 3000 MG: 2; 1 INJECTION, POWDER, FOR SOLUTION INTRAMUSCULAR; INTRAVENOUS at 10:21

## 2024-11-08 RX ADMIN — POTASSIUM CHLORIDE 40 MEQ: 1500 TABLET, EXTENDED RELEASE ORAL at 18:14

## 2024-11-08 RX ADMIN — LEVOTHYROXINE SODIUM 100 MCG: 50 TABLET ORAL at 05:11

## 2024-11-08 RX ADMIN — AMPICILLIN SODIUM AND SULBACTAM SODIUM 3000 MG: 2; 1 INJECTION, POWDER, FOR SOLUTION INTRAMUSCULAR; INTRAVENOUS at 02:41

## 2024-11-08 RX ADMIN — SODIUM CHLORIDE, PRESERVATIVE FREE 10 ML: 5 INJECTION INTRAVENOUS at 10:17

## 2024-11-08 RX ADMIN — DEXTROMETHORPHAN POLISTIREX 60 MG: 30 SUSPENSION ORAL at 10:08

## 2024-11-08 RX ADMIN — DEXTROMETHORPHAN POLISTIREX 60 MG: 30 SUSPENSION ORAL at 20:45

## 2024-11-08 RX ADMIN — TRAZODONE HYDROCHLORIDE 100 MG: 100 TABLET ORAL at 20:49

## 2024-11-08 RX ADMIN — SODIUM CHLORIDE, PRESERVATIVE FREE 10 ML: 5 INJECTION INTRAVENOUS at 20:51

## 2024-11-08 RX ADMIN — MELOXICAM 15 MG: 7.5 TABLET ORAL at 09:05

## 2024-11-08 RX ADMIN — DORNASE ALFA 5 MG: 1 SOLUTION RESPIRATORY (INHALATION) at 10:16

## 2024-11-08 RX ADMIN — CITALOPRAM HYDROBROMIDE 20 MG: 20 TABLET ORAL at 09:05

## 2024-11-08 RX ADMIN — ENOXAPARIN SODIUM 40 MG: 100 INJECTION SUBCUTANEOUS at 09:05

## 2024-11-08 RX ADMIN — CARVEDILOL 6.25 MG: 3.12 TABLET, FILM COATED ORAL at 18:14

## 2024-11-08 RX ADMIN — ALTEPLASE 10 MG: 2.2 INJECTION, POWDER, LYOPHILIZED, FOR SOLUTION INTRAVENOUS at 10:16

## 2024-11-08 RX ADMIN — METRONIDAZOLE 500 MG: 500 INJECTION, SOLUTION INTRAVENOUS at 13:29

## 2024-11-08 ASSESSMENT — PAIN DESCRIPTION - LOCATION: LOCATION: BACK

## 2024-11-08 ASSESSMENT — PAIN SCALES - GENERAL
PAINLEVEL_OUTOF10: 3
PAINLEVEL_OUTOF10: 0

## 2024-11-08 ASSESSMENT — PAIN - FUNCTIONAL ASSESSMENT: PAIN_FUNCTIONAL_ASSESSMENT: PREVENTS OR INTERFERES SOME ACTIVE ACTIVITIES AND ADLS

## 2024-11-08 ASSESSMENT — PAIN DESCRIPTION - DESCRIPTORS: DESCRIPTORS: ACHING

## 2024-11-08 ASSESSMENT — PAIN DESCRIPTION - ORIENTATION: ORIENTATION: LEFT;UPPER

## 2024-11-08 NOTE — PLAN OF CARE
Problem: Chronic Conditions and Co-morbidities  Goal: Patient's chronic conditions and co-morbidity symptoms are monitored and maintained or improved  11/7/2024 2236 by María Elena Posada RN  Outcome: Progressing  Flowsheets (Taken 11/7/2024 2047)  Care Plan - Patient's Chronic Conditions and Co-Morbidity Symptoms are Monitored and Maintained or Improved:   Monitor and assess patient's chronic conditions and comorbid symptoms for stability, deterioration, or improvement   Collaborate with multidisciplinary team to address chronic and comorbid conditions and prevent exacerbation or deterioration     Problem: Discharge Planning  Goal: Discharge to home or other facility with appropriate resources  11/7/2024 2236 by María Elena Posada, RN  Outcome: Progressing  Flowsheets (Taken 11/7/2024 2047)  Discharge to home or other facility with appropriate resources:   Identify barriers to discharge with patient and caregiver   Arrange for needed discharge resources and transportation as appropriate     Problem: Pain  Goal: Verbalizes/displays adequate comfort level or baseline comfort level  11/7/2024 2236 by María Elena Posada RN  Outcome: Progressing     Problem: Respiratory - Adult  Goal: Achieves optimal ventilation and oxygenation  11/7/2024 2236 by María Elena Posada RN  Outcome: Progressing  Flowsheets (Taken 11/7/2024 2047)  Achieves optimal ventilation and oxygenation:   Assess for changes in respiratory status   Assess for changes in mentation and behavior   Position to facilitate oxygenation and minimize respiratory effort     Problem: Safety - Adult  Goal: Free from fall injury  11/7/2024 2236 by María Elena Posada RN  Outcome: Progressing     Problem: Neurosensory - Adult  Goal: Achieves stable or improved neurological status  11/7/2024 2236 by María Elena Posada, RN  Outcome: Progressing  Flowsheets (Taken 11/7/2024 2047)  Achieves stable or improved neurological status: Assess for and report changes in neurological status      Problem: Cardiovascular - Adult  Goal: Maintains optimal cardiac output and hemodynamic stability  11/7/2024 2236 by María Elena Posada RN  Outcome: Progressing  Flowsheets (Taken 11/7/2024 2047)  Maintains optimal cardiac output and hemodynamic stability:   Monitor blood pressure and heart rate   Monitor urine output and notify Licensed Independent Practitioner for values outside of normal range   Assess for signs of decreased cardiac output     Problem: Skin/Tissue Integrity - Adult  Goal: Skin integrity remains intact  11/7/2024 2236 by María Elena Posada RN  Outcome: Progressing  Flowsheets (Taken 11/7/2024 2047)  Skin Integrity Remains Intact: Monitor for areas of redness and/or skin breakdown     Problem: Musculoskeletal - Adult  Goal: Return mobility to safest level of function  11/7/2024 2236 by María Elena Posada RN  Outcome: Progressing  Flowsheets (Taken 11/7/2024 2047)  Return Mobility to Safest Level of Function:   Assess patient stability and activity tolerance for standing, transferring and ambulating with or without assistive devices   Assist with transfers and ambulation using safe patient handling equipment as needed   Ensure adequate protection for wounds/incisions during mobilization     Problem: Musculoskeletal - Adult  Goal: Maintain proper alignment of affected body part  11/7/2024 2236 by María Elena Posada RN  Outcome: Progressing  Flowsheets (Taken 11/7/2024 2047)  Maintain proper alignment of affected body part:   Support and protect limb and body alignment per provider's orders   Instruct and reinforce with patient and family use of appropriate assistive device and precautions (e.g. spinal or hip dislocation precautions)     Problem: Gastrointestinal - Adult  Goal: Minimal or absence of nausea and vomiting  11/7/2024 2236 by María Elena Posada, RN  Outcome: Progressing  Flowsheets (Taken 11/7/2024 2047)  Minimal or absence of nausea and vomiting: Provide nonpharmacologic comfort measures as

## 2024-11-08 NOTE — PROGRESS NOTES
Pulmonary Progress Note  [facility]  Dr Lopez Lynch/Dr Jose Ramon Brewer/Dr Won ROJAS AGACNP-BC  Elisabet ROJAS NP-C      Patient - Clary Harris,  Age - 78 y.o.    - 1945      Room Number - 343/343-01   [facility] -  [id number]   [facility] # - [id number]  Date of Admission -  2024  9:06 PM        Consulting Service/Physician   Consulting - Luis Galvan MD  Primary Care Physician - Kandi Thompson,      SUBJECTIVE   Evaluated patient's at bedside, patient sleeping in bed in no acute distress but wakes easily to voice.  Patient reports significant improvement in her breathing starting treatments with tPA and dornase through chest tube.  Also reports having no right sided chest pain at this time.  She plans to start using her incentive spirometer today    States that she is still able eat and drink without difficulty and has no acute concerns at this time.    OBJECTIVE   VITALS    height is 1.651 m (5' 5\") and weight is 83.9 kg (185 lb). Her oral temperature is 97.7 °F (36.5 °C). Her blood pressure is 146/92 (abnormal) and her pulse is 71. Her respiration is 29 and oxygen saturation is 96%.     Body mass index is 30.79 kg/m².  Temperature Range: Temp: 97.7 °F (36.5 °C) Temp  Av.1 °F (36.7 °C)  Min: 97.7 °F (36.5 °C)  Max: 99 °F (37.2 °C)  BP Range:  Systolic (24hrs), Av , Min:126 , Max:149     Diastolic (24hrs), Av, Min:81, Max:98    Pulse Range: Pulse  Av.5  Min: 67  Max: 83  Respiration Range: Resp  Av.8  Min: 18  Max: 29  Current Pulse Ox::  SpO2: 96 %  24HR Pulse Ox Range:  SpO2  Av %  Min: 90 %  Max: 97 %  Oxygen Amount and Delivery: O2 Flow Rate (L/min): 3 L/min    Wt Readings from Last 3 Encounters:   24 83.9 kg (185 lb)   06/10/24 87.7 kg (193 lb 6.4 oz)   23 88.9 kg (196 lb)       I/O (24 Hours)    Intake/Output Summary (Last 24 hours)

## 2024-11-08 NOTE — PROGRESS NOTES
[facility]  IN-PATIENT SERVICE   [facility]    Progress Note    11/8/2024    9:01 AM    Name:   Clary Harris  MRN:     [id number]     Acct:      [id number]   Room:   [address] [place] [address] Day:  6  Admit Date:  11/2/2024  9:06 PM    PCP:   Kandi Thompson DO  Code Status:  Full Code    Subjective:     Patient is doing well today.  She can have normal conversations now without feeling SOB.  She has been able to walk to the bathroom without issue.  She is still requiring 3LNC.   Plan for pulm to flush her chest tube today.      Medications:     Allergies:    Allergies   Allergen Reactions    Furosemide     Hydrochlorothiazide     Sulfa Antibiotics Hives, Other (See Comments) and Itching       Current Meds:   Scheduled Meds:    ALTEplase (CATHFLO) 10 mg in sodium chloride 0.9 % 30 mL  10 mg IntraPLEUral Daily    And    dornase alpha (PULMOZYME) 5 mg in sterile water 30 mL  5 mg IntraPLEUral Daily    insulin lispro  3-15 Units SubCUTAneous TID WC    Budeson-Glycopyrrol-Formoterol  2 puff Inhalation BID    ampicillin-sulbactam  3,000 mg IntraVENous Q6H    levothyroxine  100 mcg Oral Daily    meloxicam  15 mg Oral Daily    rosuvastatin  20 mg Oral Nightly    traZODone  100 mg Oral Nightly    dextromethorphan  60 mg Oral 2 times per day    sodium chloride  80 mL IntraVENous Once    sodium chloride flush  5-40 mL IntraVENous 2 times per day    enoxaparin  40 mg SubCUTAneous Daily    amLODIPine  5 mg Oral Daily    valsartan  80 mg Oral Daily    carvedilol  6.25 mg Oral BID WC    citalopram  20 mg Oral Daily    pantoprazole  40 mg Oral QAM AC     Continuous Infusions:    dextrose      sodium chloride Stopped (11/07/24 0433)     PRN Meds: glucose, dextrose bolus **OR** dextrose bolus, glucagon (rDNA), dextrose, albuterol, benzonatate, sodium chloride flush, sodium chloride, potassium chloride **OR** potassium alternative oral replacement **OR** potassium chloride, magnesium sulfate,  Results   Component Value Date/Time    SPECIAL LEFT HAND 10ML 11/02/2024 10:35 PM     Lab Results   Component Value Date/Time    CULTURE NO GROWTH 2 DAYS 11/05/2024 03:22 PM       Radiology:  US LIVER    Result Date: 11/7/2024  Mild hepatic steatosis     IR CHEST TUBE INSERTION    Result Date: 11/5/2024  Successful ultrasound guided placement of a 10 [place] right pleural drain.     XR CHEST PORTABLE    Result Date: 11/5/2024  Unchanged patchy airspace opacity on the right. Increased size of loculated effusion superolaterally on the right. Stable moderate cardiomegaly.     US CHEST INCLUDING MEDIASTINUM    Result Date: 11/4/2024  Complex right pleural collection.     XR CHEST PORTABLE    Result Date: 11/4/2024  Mild interval worsening or severe distribution right moderate pleural effusion with associated airspace opacities, which may represent atelectasis when compared to most recent CT chest.     CT CHEST PULMONARY EMBOLISM W CONTRAST    Result Date: 11/3/2024  1. No evidence for acute pulmonary embolism. 2. Small to moderate right pleural effusion.  Subsegmental atelectasis middle lobe and right lower lobe with minimal left lung base subsegmental atelectasis 3. Incidental ectatic ascending thoracic aorta 4.2 cm diameter.     XR CHEST PORTABLE    Result Date: 11/2/2024  1. Limited study due to small lung volumes and elevation the right hemidiaphragm. 2. Mild bibasilar opacities are predominately linear and favored to represent atelectasis related to small lung volumes.  Pneumonia is considered less likely.       Physical Examination:     General appearance:  alert, cooperative and no distress  Mental Status:  oriented to person, place and time and normal affect  Lungs:  clear to auscultation bilaterally anteriorly, normal effort  Heart:  regular rate and rhythm  Abdomen:  soft, nontender, nondistended, normal bowel sounds, no masses, hepatomegaly, splenomegaly  Extremities:  no edema, redness, tenderness in the

## 2024-11-08 NOTE — PLAN OF CARE
Problem: Chronic Conditions and Co-morbidities  Goal: Patient's chronic conditions and co-morbidity symptoms are monitored and maintained or improved  11/7/2024 2236 by María Elena Posada RN  Outcome: Progressing  Flowsheets (Taken 11/7/2024 2047)  Care Plan - Patient's Chronic Conditions and Co-Morbidity Symptoms are Monitored and Maintained or Improved:   Monitor and assess patient's chronic conditions and comorbid symptoms for stability, deterioration, or improvement   Collaborate with multidisciplinary team to address chronic and comorbid conditions and prevent exacerbation or deterioration     Problem: Discharge Planning  Goal: Discharge to home or other facility with appropriate resources  11/7/2024 2236 by María Elena Posada RN  Outcome: Progressing  Flowsheets (Taken 11/7/2024 2047)  Discharge to home or other facility with appropriate resources:   Identify barriers to discharge with patient and caregiver   Arrange for needed discharge resources and transportation as appropriate     Problem: Pain  Goal: Verbalizes/displays adequate comfort level or baseline comfort level  11/7/2024 2236 by María Elena Posada RN  Outcome: Progressing     Problem: Respiratory - Adult  Goal: Achieves optimal ventilation and oxygenation  11/8/2024 1124 by Christel Seals RN  Flowsheets (Taken 11/7/2024 2047 by María Elena Posada RN)  Achieves optimal ventilation and oxygenation:   Assess for changes in respiratory status   Assess for changes in mentation and behavior   Position to facilitate oxygenation and minimize respiratory effort  11/7/2024 2236 by María Elena Posada RN  Outcome: Progressing  Flowsheets (Taken 11/7/2024 2047)  Achieves optimal ventilation and oxygenation:   Assess for changes in respiratory status   Assess for changes in mentation and behavior   Position to facilitate oxygenation and minimize respiratory effort     Problem: Safety - Adult  Goal: Free from fall injury  11/7/2024 2236 by María Elena Posada RN  Outcome:

## 2024-11-08 NOTE — SIGNIFICANT EVENT
I did get contacted from pharmacy.  The only medication they could think of that could potentially be causing the elevated liver enzymes was the Unasyn.  We are going to switch to ceftriaxone/Flagyl in place of this and observe response    Jose Eduardo Brewer MD  Pulmonary and Critical Care  [phone] [employer]  [phone] Cell

## 2024-11-08 NOTE — PROGRESS NOTES
Physical Therapy  Facility/Department: [address]  Physical Therapy Daily Treatment Note    Name: Clary Harris  : 1945  MRN: [id number]  Date of Service: 2024    Discharge Recommendations:  No therapy recommended at discharge          Patient Diagnosis(es): The primary encounter diagnosis was Chest pain, unspecified type. Diagnoses of Dyspnea, unspecified type, Hypoxia, Pneumonia of both lower lobes due to infectious organism, and Chronic obstructive pulmonary disease, unspecified COPD type (HCC) were also pertinent to this visit.  Past Medical History:  has a past medical history of COPD (chronic obstructive pulmonary disease) (HCC), Diverticulosis, DJD (degenerative joint disease), Hypertension, Hypertriglyceridemia, Hypothyroidism, Mild valvular heart disease, Osteoarthritis, Sleep apnea, Type 2 diabetes mellitus without complication (HCC), and Vertigo.  Past Surgical History:  has a past surgical history that includes Tubal ligation; Cholecystectomy; Colonoscopy; Breast biopsy; and IR CHEST TUBE INSERTION (2024).    Assessment  Body Structures, Functions, Activity Limitations Requiring Skilled Therapeutic Intervention: Decreased functional mobility ;Decreased safe awareness;Decreased endurance    Assessment: Pt presents with community acquired pneumonia and currently has a chest tube. At baseline, pt lives alone and was independent gait no device, ind ADL's and all IADL's including yard work. Pt currently demonstrating modified independence with bed mobility, ind transfers, pt ambulated 15ft x 2 and 90ft x 1 no device with SBA on 3L O2. Pt refused exs due to fatigue.  Pt demonstrates adequate safety for return to prior living situation. Pt will benefit from continued skilled PT while in the hospital for endurance, safety and functional mobility training. Pt does not require continued PT at discharge.    Requires PT Follow-Up: Yes    Activity Tolerance  Activity Tolerance: Patient limited by

## 2024-11-08 NOTE — CARE COORDINATION
ANA MARIA spoke with Nakita [facility] she will continue review if meets criteria closer to discharge, currently discharge date unknown.

## 2024-11-08 NOTE — PROGRESS NOTES
Patient ambulated to bathroom. Washed face and brushed teeth. Then ambulated from bathroom to chair twice. Tolerated well.

## 2024-11-09 ENCOUNTER — APPOINTMENT (OUTPATIENT)
Dept: CT IMAGING | Age: 79
DRG: 193 | End: 2024-11-09
Payer: MEDICARE

## 2024-11-09 ENCOUNTER — APPOINTMENT (OUTPATIENT)
Dept: GENERAL RADIOLOGY | Age: 79
DRG: 193 | End: 2024-11-09
Payer: MEDICARE

## 2024-11-09 LAB
ALBUMIN SERPL-MCNC: 2.6 G/DL (ref 3.5–5.2)
ALBUMIN/GLOB SERPL: 0.8 {RATIO} (ref 1–2.5)
ALP SERPL-CCNC: 155 U/L (ref 35–104)
ALT SERPL-CCNC: 112 U/L (ref 5–33)
ANION GAP SERPL CALCULATED.3IONS-SCNC: 9 MMOL/L (ref 9–17)
AST SERPL-CCNC: 65 U/L
BILIRUB SERPL-MCNC: 0.5 MG/DL (ref 0.3–1.2)
BUN SERPL-MCNC: 13 MG/DL (ref 8–23)
CALCIUM SERPL-MCNC: 8.6 MG/DL (ref 8.6–10.4)
CHLORIDE SERPL-SCNC: 104 MMOL/L (ref 98–107)
CO2 SERPL-SCNC: 30 MMOL/L (ref 20–31)
CREAT SERPL-MCNC: 0.6 MG/DL (ref 0.5–0.9)
GFR, ESTIMATED: >90 ML/MIN/1.73M2
GLUCOSE BLD-MCNC: 129 MG/DL (ref 65–105)
GLUCOSE BLD-MCNC: 133 MG/DL (ref 65–105)
GLUCOSE BLD-MCNC: 231 MG/DL (ref 65–105)
GLUCOSE BLD-MCNC: 235 MG/DL (ref 65–105)
GLUCOSE SERPL-MCNC: 127 MG/DL (ref 70–99)
POTASSIUM SERPL-SCNC: 3.6 MMOL/L (ref 3.7–5.3)
PROT SERPL-MCNC: 6 G/DL (ref 6.4–8.3)
SODIUM SERPL-SCNC: 143 MMOL/L (ref 135–144)

## 2024-11-09 PROCEDURE — 32551 INSERTION OF CHEST TUBE: CPT

## 2024-11-09 PROCEDURE — 71250 CT THORAX DX C-: CPT

## 2024-11-09 PROCEDURE — 99232 SBSQ HOSP IP/OBS MODERATE 35: CPT | Performed by: FAMILY MEDICINE

## 2024-11-09 PROCEDURE — 36415 COLL VENOUS BLD VENIPUNCTURE: CPT

## 2024-11-09 PROCEDURE — 2580000003 HC RX 258: Performed by: FAMILY MEDICINE

## 2024-11-09 PROCEDURE — 6360000002 HC RX W HCPCS: Performed by: INTERNAL MEDICINE

## 2024-11-09 PROCEDURE — 6360000002 HC RX W HCPCS: Performed by: FAMILY MEDICINE

## 2024-11-09 PROCEDURE — 6370000000 HC RX 637 (ALT 250 FOR IP): Performed by: FAMILY MEDICINE

## 2024-11-09 PROCEDURE — 80053 COMPREHEN METABOLIC PANEL: CPT

## 2024-11-09 PROCEDURE — 71045 X-RAY EXAM CHEST 1 VIEW: CPT

## 2024-11-09 PROCEDURE — 2060000000 HC ICU INTERMEDIATE R&B

## 2024-11-09 PROCEDURE — 97116 GAIT TRAINING THERAPY: CPT

## 2024-11-09 PROCEDURE — 6370000000 HC RX 637 (ALT 250 FOR IP): Performed by: NURSE PRACTITIONER

## 2024-11-09 PROCEDURE — 82947 ASSAY GLUCOSE BLOOD QUANT: CPT

## 2024-11-09 RX ADMIN — DEXTROMETHORPHAN POLISTIREX 60 MG: 30 SUSPENSION ORAL at 21:36

## 2024-11-09 RX ADMIN — METRONIDAZOLE 500 MG: 500 INJECTION, SOLUTION INTRAVENOUS at 04:37

## 2024-11-09 RX ADMIN — CARVEDILOL 6.25 MG: 3.12 TABLET, FILM COATED ORAL at 16:41

## 2024-11-09 RX ADMIN — VALSARTAN 80 MG: 160 TABLET, FILM COATED ORAL at 08:35

## 2024-11-09 RX ADMIN — METRONIDAZOLE 500 MG: 500 INJECTION, SOLUTION INTRAVENOUS at 21:26

## 2024-11-09 RX ADMIN — AMLODIPINE BESYLATE 5 MG: 5 TABLET ORAL at 08:35

## 2024-11-09 RX ADMIN — SODIUM CHLORIDE, PRESERVATIVE FREE 10 ML: 5 INJECTION INTRAVENOUS at 21:36

## 2024-11-09 RX ADMIN — ACETAMINOPHEN 650 MG: 325 TABLET ORAL at 21:36

## 2024-11-09 RX ADMIN — Medication 1000 MG: at 12:33

## 2024-11-09 RX ADMIN — PANTOPRAZOLE SODIUM 40 MG: 40 TABLET, DELAYED RELEASE ORAL at 05:15

## 2024-11-09 RX ADMIN — INSULIN LISPRO 5 UNITS: 100 INJECTION, SOLUTION INTRAVENOUS; SUBCUTANEOUS at 12:33

## 2024-11-09 RX ADMIN — DEXTROMETHORPHAN POLISTIREX 60 MG: 30 SUSPENSION ORAL at 08:36

## 2024-11-09 RX ADMIN — CITALOPRAM HYDROBROMIDE 20 MG: 20 TABLET ORAL at 08:36

## 2024-11-09 RX ADMIN — METRONIDAZOLE 500 MG: 500 INJECTION, SOLUTION INTRAVENOUS at 12:54

## 2024-11-09 RX ADMIN — TRAZODONE HYDROCHLORIDE 100 MG: 100 TABLET ORAL at 21:36

## 2024-11-09 RX ADMIN — LEVOTHYROXINE SODIUM 100 MCG: 50 TABLET ORAL at 05:15

## 2024-11-09 RX ADMIN — ENOXAPARIN SODIUM 40 MG: 100 INJECTION SUBCUTANEOUS at 08:36

## 2024-11-09 RX ADMIN — MELOXICAM 15 MG: 7.5 TABLET ORAL at 08:35

## 2024-11-09 RX ADMIN — CARVEDILOL 6.25 MG: 3.12 TABLET, FILM COATED ORAL at 08:34

## 2024-11-09 ASSESSMENT — PAIN SCALES - WONG BAKER: WONGBAKER_NUMERICALRESPONSE: NO HURT

## 2024-11-09 ASSESSMENT — PAIN SCALES - GENERAL: PAINLEVEL_OUTOF10: 0

## 2024-11-09 NOTE — PROGRESS NOTES
Physical Therapy  Facility/Department: [address]  Physical Therapy Daily Treatment Note    Name: Clary Harris  : 1945  MRN: [id number]  Date of Service: 2024    Discharge Recommendations:  No therapy recommended at discharge   PT Equipment Recommendations  Equipment Needed: No      Patient Diagnosis(es): The primary encounter diagnosis was Chest pain, unspecified type. Diagnoses of Dyspnea, unspecified type, Hypoxia, Pneumonia of both lower lobes due to infectious organism, and Chronic obstructive pulmonary disease, unspecified COPD type (HCC) were also pertinent to this visit.  Past Medical History:  has a past medical history of COPD (chronic obstructive pulmonary disease) (HCC), Diverticulosis, DJD (degenerative joint disease), Hypertension, Hypertriglyceridemia, Hypothyroidism, Mild valvular heart disease, Osteoarthritis, Sleep apnea, Type 2 diabetes mellitus without complication (HCC), and Vertigo.  Past Surgical History:  has a past surgical history that includes Tubal ligation; Cholecystectomy; Colonoscopy; Breast biopsy; and IR CHEST TUBE INSERTION (2024).    Assessment  Body Structures, Functions, Activity Limitations Requiring Skilled Therapeutic Intervention: Decreased functional mobility ;Decreased safe awareness;Decreased endurance    Assessment: Pt presents with community acquired pneumonia and currently has a chest tube. At baseline, pt lives alone and was independent gait no device, ind ADL's and all IADL's including yard work. Pt currently demonstrating modified independence with bed mobility, ind transfers, pt ambulated 90ft x 2 no device with SBA on 3L O2. Pt required seated rest break lasting ~4-5' between bouts of gait distance due to fatigue.  Pt demonstrates adequate safety for return to prior living situation. Pt will benefit from continued skilled PT while in the hospital for endurance, safety and functional mobility training. Pt does not require continued PT at  fatigue  Gait Deviations: Decreased step height  Distance: 90ft x 2  Comments: Pt required seated rest break lasting ~4-5' between bouts of gait distance                OutComes Score                                                  AM-PAC - Mobility    AM-PAC Basic Mobility - Inpatient   How much help is needed turning from your back to your side while in a flat bed without using bedrails?: None  How much help is needed moving from lying on your back to sitting on the side of a flat bed without using bedrails?: A Little  How much help is needed moving to and from a bed to a chair?: A Little  How much help is needed standing up from a chair using your arms?: None  How much help is needed walking in hospital room?: A Little  How much help is needed climbing 3-5 steps with a railing?: A Little  AM-[facility] Inpatient Mobility Raw Score : 20  AM-PAC Inpatient T-Scale Score : 47.67  Mobility Inpatient CMS 0-100% Score: 35.83  Mobility Inpatient CMS G-Code Modifier : CJ                Goals  Short Term Goals  Time Frame for Short Term Goals: 14 visits  Short Term Goal 1: supine to and from sit independent  Short Term Goal 2: sit to and from stand independent  Short Term Goal 3: ambulate 200ft no device independent  Short Term Goal 4: one step independently  Patient Goals   Patient Goals : to go home       Education  Patient Education  Education Given To: Patient  Education Provided: Role of Therapy;Plan of Care;Energy Conservation  Education Provided Comments: importance of mobility. RN educated on importance of pt walking in hallway ~2-3x/day  Education Method: Demonstration;Verbal  Barriers to Learning: None  Education Outcome: Verbalized understanding;Demonstrated understanding;Continued education needed      Therapy Time   Individual Concurrent Group Co-treatment   Time In 1036         Time Out 1113         Minutes 37         Timed Code Treatment Minutes: 30 Minutes       FLYNN PARHAM, PT

## 2024-11-09 NOTE — PROGRESS NOTES
[facility]  IN-PATIENT SERVICE   [facility] - Location: [place]    Progress Note    11/9/2024    8:25 AM    Name:   Clary Harris  MRN:     [id number]     Acct:      [id number]   Room:   [address] [place] [address] Day:  7  Admit Date:  11/2/2024  9:06 PM    PCP:   Kandi Thompson DO  Code Status:  Full Code    Subjective:     Patient doing better this AM clinically.  Feeling stronger.  Saturating upper 90s on 3 L NC.  Plans noted for repeat chest CT.    Medications:     Allergies:    Allergies   Allergen Reactions    Furosemide     Hydrochlorothiazide     Sulfa Antibiotics Hives, Other (See Comments) and Itching       Current Meds:   Scheduled Meds:    cefTRIAXone (ROCEPHIN) IV  1,000 mg IntraVENous Q24H    metroNIDAZOLE  500 mg IntraVENous Q8H    ALTEplase (CATHFLO) 10 mg in sodium chloride 0.9 % 30 mL  10 mg IntraPLEUral Daily    And    dornase alpha (PULMOZYME) 5 mg in sterile water 30 mL  5 mg IntraPLEUral Daily    insulin lispro  3-15 Units SubCUTAneous TID WC    Budeson-Glycopyrrol-Formoterol  2 puff Inhalation BID    levothyroxine  100 mcg Oral Daily    meloxicam  15 mg Oral Daily    traZODone  100 mg Oral Nightly    dextromethorphan  60 mg Oral 2 times per day    sodium chloride  80 mL IntraVENous Once    sodium chloride flush  5-40 mL IntraVENous 2 times per day    enoxaparin  40 mg SubCUTAneous Daily    amLODIPine  5 mg Oral Daily    valsartan  80 mg Oral Daily    carvedilol  6.25 mg Oral BID WC    citalopram  20 mg Oral Daily    pantoprazole  40 mg Oral QAM AC     Continuous Infusions:    dextrose      sodium chloride Stopped (11/07/24 0433)     PRN Meds: glucose, dextrose bolus **OR** dextrose bolus, glucagon (rDNA), dextrose, albuterol, benzonatate, sodium chloride flush, sodium chloride, potassium chloride **OR** potassium alternative oral replacement **OR** potassium chloride, magnesium sulfate, ondansetron **OR** ondansetron, polyethylene glycol, acetaminophen **OR**

## 2024-11-09 NOTE — PROGRESS NOTES
[facility] PULMONARY,CRITICAL CARE & SLEEP   Jose Ramonmarie Oh MD/Lopez ROJAS AGACNP-BC, NP-C       Elisabet ROJAS NP-C      Bruno ROJAS NP-C                                  Pulmonary Critical Care Progress Note    Patient - Clary Harris   Age - 79 y.o.   - 1945  MRN - [id number]  [facility] [id number]  Date of Admission - 2024  9:06 PM    Consulting Service/Physician:       Primary Care Physician: Kandi Thompson DO    SUBJECTIVE:     Chief Complaint:   Chief Complaint   Patient presents with    Shortness of Breath    Chest Pain     Onset Wednesday    Chills   Pleuritic chest pain  Subjective:    Patient definitely feeling better but still admits that she has some degree of shortness of breath.  She has not had any fever.  She was able to get up and ambulate around the halls yesterday.  Her chest x-ray was completed this morning.    I did speak with the pharmacist yesterday.  The only etiology for the elevated liver enzymes we could come up with was potential side effect of Unasyn so I switched to ceftriaxone and Flagyl.  We are going to repeat liver function test today.    VITALS  /86   Pulse 65   Temp 98.1 °F (36.7 °C) (Oral)   Resp 16   Ht 1.651 m (5' 5\")   Wt 83.9 kg (185 lb)   SpO2 94%   BMI 30.79 kg/m²   Wt Readings from Last 3 Encounters:   24 83.9 kg (185 lb)   06/10/24 87.7 kg (193 lb 6.4 oz)   23 88.9 kg (196 lb)     I/O (24 Hours)    Intake/Output Summary (Last 24 hours) at 2024 0705  Last data filed at 2024 1751  Gross per 24 hour   Intake 890.39 ml   Output 265 ml   Net 625.39 ml     Ventilator:   Settings  FiO2 : 21 %  Exam:   Physical Exam   Constitutional:  Alert and cooperative   Cardiovascular:  Regular rate and rhythm.  Normal heart tones.  No JVD.    Pulmonary/Chest: Diminished over the right lower hemithorax, more air entry over the right upper chest, clear left chest, chest tube in place  seen pulmonologist in quite some time, many years ago did follow-up with Dr. Olea with a PFT at [facility], not a current patient  KARENA, previous sleep study at [facility], severity unknown, was on a CPAP for several years but simply stopped using it and turned it back into the [employer], she did not recall the name of the [employer] leukocytosis related to pneumonia  Former tobacco use, quitting 30 years ago, 22 pack year history age 19 to age 41 approximately 1 pack/day she tells me  Elevated liver enzymes felt to be potentially related to side effect of Unasyn  Full Code  PLAN:   CT chest without contrast today to determine whether or not a second chest tube will be needed  Follow-up liver enzymes after discontinuation of Unasyn  Eventual outpatient pulmonary follow-up encouraged    I did attempt to call the patient's daughter yesterday but was only able leave a voicemail.  I will try again later today after I see the CT chest.      Electronically signed by Jose Eduardo Brewer MD on 11/09/24     This progress note was completed using a voice transcription system. Every effort was made to ensure accuracy. However, inadvertent computerized transcription errors may be present.    Jose Eduardo Brewer MD  Pulmonary and Critical Care   [phone] [employer]  [phone] Cell

## 2024-11-09 NOTE — PLAN OF CARE
Problem: Chronic Conditions and Co-morbidities  Goal: Patient's chronic conditions and co-morbidity symptoms are monitored and maintained or improved  Outcome: Progressing  Flowsheets (Taken 11/8/2024 2000)  Care Plan - Patient's Chronic Conditions and Co-Morbidity Symptoms are Monitored and Maintained or Improved:   Monitor and assess patient's chronic conditions and comorbid symptoms for stability, deterioration, or improvement   Collaborate with multidisciplinary team to address chronic and comorbid conditions and prevent exacerbation or deterioration     Problem: Discharge Planning  Goal: Discharge to home or other facility with appropriate resources  Outcome: Progressing  Flowsheets (Taken 11/8/2024 2000)  Discharge to home or other facility with appropriate resources:   Identify barriers to discharge with patient and caregiver   Arrange for needed discharge resources and transportation as appropriate     Problem: Pain  Goal: Verbalizes/displays adequate comfort level or baseline comfort level  Outcome: Progressing     Problem: Respiratory - Adult  Goal: Achieves optimal ventilation and oxygenation  11/8/2024 2346 by María Elena Posada, RN  Outcome: Progressing     Problem: Safety - Adult  Goal: Free from fall injury  Outcome: Progressing     Problem: Neurosensory - Adult  Goal: Achieves stable or improved neurological status  Outcome: Progressing  Flowsheets (Taken 11/8/2024 2000)  Achieves stable or improved neurological status: Assess for and report changes in neurological status     Problem: Cardiovascular - Adult  Goal: Maintains optimal cardiac output and hemodynamic stability  11/8/2024 2346 by María Elena Posada RN  Outcome: Progressing  Flowsheets (Taken 11/8/2024 2000)  Maintains optimal cardiac output and hemodynamic stability: Monitor blood pressure and heart rate     Problem: Skin/Tissue Integrity - Adult  Goal: Skin integrity remains intact  Outcome: Progressing  Flowsheets (Taken 11/8/2024

## 2024-11-10 LAB
ALBUMIN SERPL-MCNC: 2.6 G/DL (ref 3.5–5.2)
ALBUMIN/GLOB SERPL: 0.8 {RATIO} (ref 1–2.5)
ALP SERPL-CCNC: 130 U/L (ref 35–104)
ALT SERPL-CCNC: 79 U/L (ref 5–33)
ANION GAP SERPL CALCULATED.3IONS-SCNC: 5 MMOL/L (ref 9–17)
AST SERPL-CCNC: 28 U/L
BASOPHILS # BLD: 0 K/UL (ref 0–0.2)
BASOPHILS NFR BLD: 0 % (ref 0–2)
BILIRUB SERPL-MCNC: 0.4 MG/DL (ref 0.3–1.2)
BUN SERPL-MCNC: 12 MG/DL (ref 8–23)
CALCIUM SERPL-MCNC: 8.6 MG/DL (ref 8.6–10.4)
CHLORIDE SERPL-SCNC: 106 MMOL/L (ref 98–107)
CO2 SERPL-SCNC: 35 MMOL/L (ref 20–31)
CREAT SERPL-MCNC: 0.7 MG/DL (ref 0.5–0.9)
EOSINOPHIL # BLD: 0.3 K/UL (ref 0–0.4)
EOSINOPHILS RELATIVE PERCENT: 2 % (ref 1–4)
ERYTHROCYTE [DISTWIDTH] IN BLOOD BY AUTOMATED COUNT: 15.1 % (ref 12.5–15.4)
GFR, ESTIMATED: 88 ML/MIN/1.73M2
GLUCOSE BLD-MCNC: 105 MG/DL (ref 65–105)
GLUCOSE BLD-MCNC: 146 MG/DL (ref 65–105)
GLUCOSE BLD-MCNC: 161 MG/DL (ref 65–105)
GLUCOSE BLD-MCNC: 210 MG/DL (ref 65–105)
GLUCOSE SERPL-MCNC: 137 MG/DL (ref 70–99)
HCT VFR BLD AUTO: 35.5 % (ref 36–46)
HGB BLD-MCNC: 11.5 G/DL (ref 12–16)
LYMPHOCYTES NFR BLD: 1.5 K/UL (ref 1–4.8)
LYMPHOCYTES RELATIVE PERCENT: 14 % (ref 24–44)
MCH RBC QN AUTO: 28.3 PG (ref 26–34)
MCHC RBC AUTO-ENTMCNC: 32.5 G/DL (ref 31–37)
MCV RBC AUTO: 87.1 FL (ref 80–100)
MICROORGANISM SPEC CULT: NORMAL
MICROORGANISM/AGENT SPEC: NORMAL
MICROORGANISM/AGENT SPEC: NORMAL
MONOCYTES NFR BLD: 1.5 K/UL (ref 0.1–1.2)
MONOCYTES NFR BLD: 13 % (ref 2–11)
NEUTROPHILS NFR BLD: 71 % (ref 36–66)
NEUTS SEG NFR BLD: 7.9 K/UL (ref 1.8–7.7)
PLATELET # BLD AUTO: 366 K/UL (ref 140–450)
PMV BLD AUTO: 7.2 FL (ref 6–12)
POTASSIUM SERPL-SCNC: 3.5 MMOL/L (ref 3.7–5.3)
PROT SERPL-MCNC: 5.7 G/DL (ref 6.4–8.3)
RBC # BLD AUTO: 4.08 M/UL (ref 4–5.2)
SODIUM SERPL-SCNC: 146 MMOL/L (ref 135–144)
SPECIMEN DESCRIPTION: NORMAL
WBC OTHER # BLD: 11.2 K/UL (ref 3.5–11)

## 2024-11-10 PROCEDURE — 6370000000 HC RX 637 (ALT 250 FOR IP): Performed by: NURSE PRACTITIONER

## 2024-11-10 PROCEDURE — 82947 ASSAY GLUCOSE BLOOD QUANT: CPT

## 2024-11-10 PROCEDURE — 80053 COMPREHEN METABOLIC PANEL: CPT

## 2024-11-10 PROCEDURE — 2060000000 HC ICU INTERMEDIATE R&B

## 2024-11-10 PROCEDURE — 99232 SBSQ HOSP IP/OBS MODERATE 35: CPT | Performed by: FAMILY MEDICINE

## 2024-11-10 PROCEDURE — 6370000000 HC RX 637 (ALT 250 FOR IP): Performed by: FAMILY MEDICINE

## 2024-11-10 PROCEDURE — 6360000002 HC RX W HCPCS: Performed by: FAMILY MEDICINE

## 2024-11-10 PROCEDURE — 2580000003 HC RX 258: Performed by: FAMILY MEDICINE

## 2024-11-10 PROCEDURE — 36415 COLL VENOUS BLD VENIPUNCTURE: CPT

## 2024-11-10 PROCEDURE — 85025 COMPLETE CBC W/AUTO DIFF WBC: CPT

## 2024-11-10 PROCEDURE — 32551 INSERTION OF CHEST TUBE: CPT

## 2024-11-10 PROCEDURE — 6360000002 HC RX W HCPCS: Performed by: INTERNAL MEDICINE

## 2024-11-10 RX ADMIN — ACETAMINOPHEN 650 MG: 325 TABLET ORAL at 21:54

## 2024-11-10 RX ADMIN — SODIUM CHLORIDE, PRESERVATIVE FREE 10 ML: 5 INJECTION INTRAVENOUS at 20:17

## 2024-11-10 RX ADMIN — Medication 1000 MG: at 12:09

## 2024-11-10 RX ADMIN — CARVEDILOL 6.25 MG: 3.12 TABLET, FILM COATED ORAL at 18:05

## 2024-11-10 RX ADMIN — MELOXICAM 15 MG: 7.5 TABLET ORAL at 08:08

## 2024-11-10 RX ADMIN — VALSARTAN 80 MG: 160 TABLET, FILM COATED ORAL at 08:08

## 2024-11-10 RX ADMIN — POTASSIUM CHLORIDE 40 MEQ: 1500 TABLET, EXTENDED RELEASE ORAL at 08:08

## 2024-11-10 RX ADMIN — CITALOPRAM HYDROBROMIDE 20 MG: 20 TABLET ORAL at 08:09

## 2024-11-10 RX ADMIN — TRAZODONE HYDROCHLORIDE 100 MG: 100 TABLET ORAL at 21:54

## 2024-11-10 RX ADMIN — CARVEDILOL 6.25 MG: 3.12 TABLET, FILM COATED ORAL at 08:09

## 2024-11-10 RX ADMIN — ENOXAPARIN SODIUM 40 MG: 100 INJECTION SUBCUTANEOUS at 08:08

## 2024-11-10 RX ADMIN — DEXTROMETHORPHAN POLISTIREX 60 MG: 30 SUSPENSION ORAL at 10:47

## 2024-11-10 RX ADMIN — LEVOTHYROXINE SODIUM 100 MCG: 50 TABLET ORAL at 05:52

## 2024-11-10 RX ADMIN — METRONIDAZOLE 500 MG: 500 INJECTION, SOLUTION INTRAVENOUS at 04:39

## 2024-11-10 RX ADMIN — SODIUM CHLORIDE, PRESERVATIVE FREE 10 ML: 5 INJECTION INTRAVENOUS at 08:16

## 2024-11-10 RX ADMIN — AMLODIPINE BESYLATE 5 MG: 5 TABLET ORAL at 08:09

## 2024-11-10 RX ADMIN — PANTOPRAZOLE SODIUM 40 MG: 40 TABLET, DELAYED RELEASE ORAL at 05:52

## 2024-11-10 RX ADMIN — METRONIDAZOLE 500 MG: 500 INJECTION, SOLUTION INTRAVENOUS at 20:30

## 2024-11-10 RX ADMIN — INSULIN LISPRO 5 UNITS: 100 INJECTION, SOLUTION INTRAVENOUS; SUBCUTANEOUS at 12:09

## 2024-11-10 RX ADMIN — METRONIDAZOLE 500 MG: 500 INJECTION, SOLUTION INTRAVENOUS at 12:22

## 2024-11-10 RX ADMIN — DEXTROMETHORPHAN POLISTIREX 60 MG: 30 SUSPENSION ORAL at 20:18

## 2024-11-10 ASSESSMENT — PAIN SCALES - WONG BAKER: WONGBAKER_NUMERICALRESPONSE: NO HURT

## 2024-11-10 ASSESSMENT — PAIN SCALES - GENERAL: PAINLEVEL_OUTOF10: 0

## 2024-11-10 NOTE — CARE COORDINATION
ANA MARIA spoke with Nakita from [facility] and they are able to accept patient contingent on plan being to continue chest tube and IV antibiotics.

## 2024-11-10 NOTE — PLAN OF CARE
Problem: Chronic Conditions and Co-morbidities  Goal: Patient's chronic conditions and co-morbidity symptoms are monitored and maintained or improved  11/10/2024 0311 by Hailey Avendano RN  Outcome: Progressing     Problem: Discharge Planning  Goal: Discharge to home or other facility with appropriate resources  11/10/2024 0311 by Hailey Avendano RN  Outcome: Progressing     Problem: Pain  Goal: Verbalizes/displays adequate comfort level or baseline comfort level  11/10/2024 0311 by Hailey Avendano RN  Outcome: Progressing     Problem: Respiratory - Adult  Goal: Achieves optimal ventilation and oxygenation  Outcome: Progressing     Problem: Safety - Adult  Goal: Free from fall injury  Outcome: Progressing     Problem: Neurosensory - Adult  Goal: Achieves stable or improved neurological status  Outcome: Progressing     Problem: Cardiovascular - Adult  Goal: Maintains optimal cardiac output and hemodynamic stability  Outcome: Progressing     Problem: Skin/Tissue Integrity - Adult  Goal: Skin integrity remains intact  11/10/2024 0311 by Hailey Avendano RN  Outcome: Progressing     Problem: Musculoskeletal - Adult  Goal: Return mobility to safest level of function  Outcome: Progressing     Problem: Musculoskeletal - Adult  Goal: Maintain proper alignment of affected body part  Outcome: Progressing     Problem: Gastrointestinal - Adult  Goal: Minimal or absence of nausea and vomiting  Outcome: Progressing     Problem: Infection - Adult  Goal: Absence of infection at discharge  Outcome: Progressing     Problem: Metabolic/Fluid and Electrolytes - Adult  Goal: Electrolytes maintained within normal limits  Outcome: Progressing     Problem: Hematologic - Adult  Goal: Maintains hematologic stability  Outcome: Progressing     Problem: Skin/Tissue Integrity  Goal: Absence of new skin breakdown  Description: 1.  Monitor for areas of redness and/or skin breakdown  2.  Assess vascular access sites hourly  3.  Every 4-6 hours minimum:

## 2024-11-10 NOTE — PROGRESS NOTES
[facility]  IN-PATIENT SERVICE   [facility] - Location: [place]    Progress Note    11/10/2024    8:10 AM    Name:   Clary Harris  MRN:     [id number]     Acct:      [id number]   Room:   [address] [place] [address] Day:  8  Admit Date:  11/2/2024  9:06 PM    PCP:   Kandi Thompson DO  Code Status:  Full Code    Subjective:     Patient OK.  Per nursing no Chest tube output since Friday.  Saturating 98% on 3 L NC while I am in room.  Per nursing drops to 85% on ambulation when oxygen is taken off.  She feels improved.  Afebrile, VSS.  Potassium low- will be replaced.    Medications:     Allergies:    Allergies   Allergen Reactions    Furosemide     Hydrochlorothiazide     Sulfa Antibiotics Hives, Other (See Comments) and Itching       Current Meds:   Scheduled Meds:    cefTRIAXone (ROCEPHIN) IV  1,000 mg IntraVENous Q24H    metroNIDAZOLE  500 mg IntraVENous Q8H    insulin lispro  3-15 Units SubCUTAneous TID WC    Budeson-Glycopyrrol-Formoterol  2 puff Inhalation BID    levothyroxine  100 mcg Oral Daily    meloxicam  15 mg Oral Daily    traZODone  100 mg Oral Nightly    dextromethorphan  60 mg Oral 2 times per day    sodium chloride  80 mL IntraVENous Once    sodium chloride flush  5-40 mL IntraVENous 2 times per day    enoxaparin  40 mg SubCUTAneous Daily    amLODIPine  5 mg Oral Daily    valsartan  80 mg Oral Daily    carvedilol  6.25 mg Oral BID WC    citalopram  20 mg Oral Daily    pantoprazole  40 mg Oral QAM AC     Continuous Infusions:    dextrose      sodium chloride Stopped (11/07/24 0433)     PRN Meds: glucose, dextrose bolus **OR** dextrose bolus, glucagon (rDNA), dextrose, albuterol, benzonatate, sodium chloride flush, sodium chloride, potassium chloride **OR** potassium alternative oral replacement **OR** potassium chloride, magnesium sulfate, ondansetron **OR** ondansetron, polyethylene glycol, acetaminophen **OR** acetaminophen    Data:     Vitals:  /79   Pulse 60   Temp

## 2024-11-10 NOTE — PROGRESS NOTES
[facility] PULMONARY,CRITICAL CARE & SLEEP   Jose Ramon Oh MD/Lopez Brewer MD/Won ROJAS AGAP-BC, NP-C      Elisabet ROJAS NP-C    Bruno ROJAS NP-C                                         Pulmonary Progress Note    Patient - Clary Harris   Age - 79 y.o.   - 1945  MRN - [id number]  [id number]  Date of Admission - 2024  9:06 PM    Consulting Service/Physician:       Primary Care Physician: Kandi Thompson DO    SUBJECTIVE:     Chief Complaint:   Chief Complaint   Patient presents with    Shortness of Breath    Chest Pain     Onset Wednesday    Chills     Subjective:    She is resting in bed, dyspnea is improved  We discussed her chest tube, CT chest is improved, will reassess in the morning  She has had no drainage since Friday p.m.  If continues in this direction, likely DC chest tube tomorrow  and will need to follow-up outpatient with serial x-rays and Cts      VITALS  /79   Pulse 60   Temp 98.6 °F (37 °C) (Oral)   Resp 16   Ht 1.651 m (5' 5\")   Wt 83.9 kg (185 lb)   SpO2 93%   BMI 30.79 kg/m²   Wt Readings from Last 3 Encounters:   24 83.9 kg (185 lb)   06/10/24 87.7 kg (193 lb 6.4 oz)   23 88.9 kg (196 lb)     I/O (24 Hours)    Intake/Output Summary (Last 24 hours) at 11/10/2024 0836  Last data filed at 2024 2138  Gross per 24 hour   Intake 802.67 ml   Output 30 ml   Net 772.67 ml     Ventilator:   Settings  FiO2 : 21 %  Exam:   Physical Exam   Constitutional: In no acute distress, alert, 3 L nasal cannula  HENT: Unremarkable  Head: Normocephalic and atraumatic.   Eyes: EOM are normal. Pupils are equal, round, and reactive to light.   Neck: Neck supple.   Cardiovascular:  Regular rate and rhythm.  Normal heart tones.  No JVD.    Pulmonary/Chest: Right posterior pigtail catheter, chest tube no drainage since  PM; diminished breath sounds with some coarseness on the right  Abdominal:

## 2024-11-11 ENCOUNTER — APPOINTMENT (OUTPATIENT)
Dept: GENERAL RADIOLOGY | Age: 79
DRG: 193 | End: 2024-11-11
Payer: MEDICARE

## 2024-11-11 LAB
ALBUMIN SERPL-MCNC: 2.6 G/DL (ref 3.5–5.2)
ALBUMIN/GLOB SERPL: 0.8 {RATIO} (ref 1–2.5)
ALP SERPL-CCNC: 115 U/L (ref 35–104)
ALT SERPL-CCNC: 61 U/L (ref 5–33)
ANION GAP SERPL CALCULATED.3IONS-SCNC: 7 MMOL/L (ref 9–17)
AST SERPL-CCNC: 29 U/L
BASOPHILS # BLD: 0 K/UL (ref 0–0.2)
BASOPHILS NFR BLD: 0 % (ref 0–2)
BILIRUB SERPL-MCNC: 0.4 MG/DL (ref 0.3–1.2)
BUN SERPL-MCNC: 11 MG/DL (ref 8–23)
CALCIUM SERPL-MCNC: 8.6 MG/DL (ref 8.6–10.4)
CHLORIDE SERPL-SCNC: 105 MMOL/L (ref 98–107)
CO2 SERPL-SCNC: 33 MMOL/L (ref 20–31)
CREAT SERPL-MCNC: 0.5 MG/DL (ref 0.5–0.9)
EOSINOPHIL # BLD: 0 K/UL (ref 0–0.4)
EOSINOPHILS RELATIVE PERCENT: 0 % (ref 1–4)
ERYTHROCYTE [DISTWIDTH] IN BLOOD BY AUTOMATED COUNT: 15.7 % (ref 12.5–15.4)
GFR, ESTIMATED: >90 ML/MIN/1.73M2
GLUCOSE BLD-MCNC: 125 MG/DL (ref 65–105)
GLUCOSE BLD-MCNC: 142 MG/DL (ref 65–105)
GLUCOSE BLD-MCNC: 168 MG/DL (ref 65–105)
GLUCOSE SERPL-MCNC: 129 MG/DL (ref 70–99)
HCT VFR BLD AUTO: 36.5 % (ref 36–46)
HGB BLD-MCNC: 11.7 G/DL (ref 12–16)
LYMPHOCYTES NFR BLD: 1.27 K/UL (ref 1–4.8)
LYMPHOCYTES RELATIVE PERCENT: 11 % (ref 24–44)
MCH RBC QN AUTO: 27.9 PG (ref 26–34)
MCHC RBC AUTO-ENTMCNC: 32 G/DL (ref 31–37)
MCV RBC AUTO: 87.2 FL (ref 80–100)
MONOCYTES NFR BLD: 0.58 K/UL (ref 0.1–0.8)
MONOCYTES NFR BLD: 5 % (ref 1–7)
MORPHOLOGY: NORMAL
NEUTROPHILS NFR BLD: 84 % (ref 36–66)
NEUTS SEG NFR BLD: 9.65 K/UL (ref 1.8–7.7)
PLATELET # BLD AUTO: 393 K/UL (ref 140–450)
PMV BLD AUTO: 7.5 FL (ref 6–12)
POTASSIUM SERPL-SCNC: 3.5 MMOL/L (ref 3.7–5.3)
PROT SERPL-MCNC: 5.9 G/DL (ref 6.4–8.3)
RBC # BLD AUTO: 4.19 M/UL (ref 4–5.2)
SODIUM SERPL-SCNC: 145 MMOL/L (ref 135–144)
WBC OTHER # BLD: 11.5 K/UL (ref 3.5–11)

## 2024-11-11 PROCEDURE — 97116 GAIT TRAINING THERAPY: CPT

## 2024-11-11 PROCEDURE — 85025 COMPLETE CBC W/AUTO DIFF WBC: CPT

## 2024-11-11 PROCEDURE — 2700000000 HC OXYGEN THERAPY PER DAY

## 2024-11-11 PROCEDURE — 80053 COMPREHEN METABOLIC PANEL: CPT

## 2024-11-11 PROCEDURE — 6370000000 HC RX 637 (ALT 250 FOR IP): Performed by: FAMILY MEDICINE

## 2024-11-11 PROCEDURE — 99232 SBSQ HOSP IP/OBS MODERATE 35: CPT | Performed by: FAMILY MEDICINE

## 2024-11-11 PROCEDURE — 2060000000 HC ICU INTERMEDIATE R&B

## 2024-11-11 PROCEDURE — 2580000003 HC RX 258: Performed by: FAMILY MEDICINE

## 2024-11-11 PROCEDURE — 82947 ASSAY GLUCOSE BLOOD QUANT: CPT

## 2024-11-11 PROCEDURE — 6360000002 HC RX W HCPCS: Performed by: INTERNAL MEDICINE

## 2024-11-11 PROCEDURE — 71045 X-RAY EXAM CHEST 1 VIEW: CPT

## 2024-11-11 PROCEDURE — 6360000002 HC RX W HCPCS: Performed by: FAMILY MEDICINE

## 2024-11-11 PROCEDURE — 94761 N-INVAS EAR/PLS OXIMETRY MLT: CPT

## 2024-11-11 PROCEDURE — 6370000000 HC RX 637 (ALT 250 FOR IP): Performed by: NURSE PRACTITIONER

## 2024-11-11 PROCEDURE — 36415 COLL VENOUS BLD VENIPUNCTURE: CPT

## 2024-11-11 RX ADMIN — SODIUM CHLORIDE, PRESERVATIVE FREE 10 ML: 5 INJECTION INTRAVENOUS at 20:18

## 2024-11-11 RX ADMIN — METRONIDAZOLE 500 MG: 500 INJECTION, SOLUTION INTRAVENOUS at 12:43

## 2024-11-11 RX ADMIN — ACETAMINOPHEN 650 MG: 325 TABLET ORAL at 23:21

## 2024-11-11 RX ADMIN — LEVOTHYROXINE SODIUM 100 MCG: 50 TABLET ORAL at 05:15

## 2024-11-11 RX ADMIN — PANTOPRAZOLE SODIUM 40 MG: 40 TABLET, DELAYED RELEASE ORAL at 05:15

## 2024-11-11 RX ADMIN — VALSARTAN 80 MG: 160 TABLET, FILM COATED ORAL at 09:58

## 2024-11-11 RX ADMIN — CARVEDILOL 6.25 MG: 3.12 TABLET, FILM COATED ORAL at 09:58

## 2024-11-11 RX ADMIN — Medication 1000 MG: at 12:36

## 2024-11-11 RX ADMIN — MELOXICAM 15 MG: 7.5 TABLET ORAL at 09:59

## 2024-11-11 RX ADMIN — POTASSIUM CHLORIDE 40 MEQ: 1500 TABLET, EXTENDED RELEASE ORAL at 09:59

## 2024-11-11 RX ADMIN — CITALOPRAM HYDROBROMIDE 20 MG: 20 TABLET ORAL at 09:59

## 2024-11-11 RX ADMIN — METRONIDAZOLE 500 MG: 500 INJECTION, SOLUTION INTRAVENOUS at 05:08

## 2024-11-11 RX ADMIN — DEXTROMETHORPHAN POLISTIREX 60 MG: 30 SUSPENSION ORAL at 20:18

## 2024-11-11 RX ADMIN — DEXTROMETHORPHAN POLISTIREX 60 MG: 30 SUSPENSION ORAL at 10:15

## 2024-11-11 RX ADMIN — METRONIDAZOLE 500 MG: 500 INJECTION, SOLUTION INTRAVENOUS at 20:18

## 2024-11-11 RX ADMIN — CARVEDILOL 6.25 MG: 3.12 TABLET, FILM COATED ORAL at 18:15

## 2024-11-11 RX ADMIN — AMLODIPINE BESYLATE 5 MG: 5 TABLET ORAL at 09:58

## 2024-11-11 RX ADMIN — TRAZODONE HYDROCHLORIDE 100 MG: 100 TABLET ORAL at 23:21

## 2024-11-11 RX ADMIN — ENOXAPARIN SODIUM 40 MG: 100 INJECTION SUBCUTANEOUS at 09:59

## 2024-11-11 ASSESSMENT — PAIN SCALES - GENERAL: PAINLEVEL_OUTOF10: 0

## 2024-11-11 ASSESSMENT — PAIN DESCRIPTION - DESCRIPTORS: DESCRIPTORS: DISCOMFORT

## 2024-11-11 ASSESSMENT — PAIN SCALES - WONG BAKER: WONGBAKER_NUMERICALRESPONSE: NO HURT

## 2024-11-11 NOTE — PROGRESS NOTES
Pulmonary Progress Note  Pulmonary [facility] Critical Care Specialists      Patient - Clary Harris,  Age - 79 y.o.    - 1945      Room Number - 343/343-01   N -  [id number]   [facility] # - [id number]  Date of Admission -  2024  9:06 PM        Consulting Service/Physician   Consulting - Luis Galvan MD  Primary Care Physician - Kandi Thompson DO     SUBJECTIVE   Patient currently is resting quietly  No apparent acute distress      OBJECTIVE   VITALS    height is 1.651 m (5' 5\") and weight is 83.9 kg (185 lb). Her oral temperature is 98.8 °F (37.1 °C). Her blood pressure is 123/83 and her pulse is 50. Her respiration is 22 and oxygen saturation is 92%.     Body mass index is 30.79 kg/m².  Temperature Range: Temp: 98.8 °F (37.1 °C) Temp  Av.4 °F (36.9 °C)  Min: 97.5 °F (36.4 °C)  Max: 98.8 °F (37.1 °C)  BP Range:  Systolic (24hrs), Av , Min:111 , Max:130     Diastolic (24hrs), Av, Min:70, Max:83    Pulse Range: Pulse  Av.8  Min: 50  Max: 72  Respiration Range: Resp  Av.8  Min: 16  Max: 28  Current Pulse Ox::  SpO2: 92 %  24HR Pulse Ox Range:  SpO2  Av %  Min: 92 %  Max: 94 %  Oxygen Amount and Delivery: O2 Flow Rate (L/min): 2 L/min    Wt Readings from Last 3 Encounters:   24 83.9 kg (185 lb)   06/10/24 87.7 kg (193 lb 6.4 oz)   23 88.9 kg (196 lb)       I/O (24 Hours)    Intake/Output Summary (Last 24 hours) at 2024 0935  Last data filed at 2024 0630  Gross per 24 hour   Intake --   Output 10 ml   Net -10 ml       EXAM   O2 saturations 92% on 2 L.  Temperature 98  General Appearance  Awake, alert, oriented, in no acute distress  HEENT - normocephalic, atraumatic.  Neck - Supple,  trachea midline   Lungs -decreased breath sounds at the right base  Heart Exam:PMI normal. No lifts, heaves, or thrills. RRR. No murmurs, clicks, gallops, or rubs  Abdomen Exam: Abdomen soft, non-tender.   Extremity Exam: No signs of cyanosis    MEDS      cefTRIAXone  Date    APTT 25.9 11/02/2024         RADIOLOGY     (See actual reports for details)    ASSESSMENT/PLAN     Patient Active Problem List   Diagnosis    COPD (chronic obstructive pulmonary disease) (HCC)    Diverticulosis    DJD (degenerative joint disease)    Hypertension    Hypertriglyceridemia    Hypothyroidism    Mild valvular heart disease    Sleep apnea    Type 2 diabetes mellitus without complication (HCC)    Vertigo    Major depressive disorder, recurrent, mild    Major depressive disorder, recurrent, moderate    Major depressive disorder, recurrent, unspecified    Pneumonia due to infectious agent    Chest pain     Acute hypoxic respiratory insufficiency related to pneumonia, pulse ox 86% on room air, now on 2L  Community-acquired pneumonia-symptoms present for roughly 7 days prior to presentation to the hospital  Right-sided pleurisy with complex parapneumonic effusion based on analysis status post chest tube insertion with 3 sequential days of tPA dornase intrapleural instillation completed November 8, still significant opacification lower hemithorax on the right concerning for persistent loculations  Elevated D-dimer, felt to be related to current infection  COPD, mild per patient, has been on Breztri recently due to some wheezing, has not seen pulmonologist in quite some time, many years ago did follow-up with Dr. Olea with a PFT at [facility], not a current patient  KARENA, previous sleep study at [facility], severity unknown, was on a CPAP for several years but simply stopped using it and turned it back into the DME company, she did not recall the name of the DME company leukocytosis related to pneumonia  Former tobacco use, quitting 30 years ago, 22 pack year history age 19 to age 41 approximately 1 pack/day she tells me  Elevated liver enzymes felt to be potentially related to side effect of Unasyn  Full Code    Has evidence of a loculated right pleural effusion.  It does not seem to be in connection

## 2024-11-11 NOTE — PLAN OF CARE
Problem: Chronic Conditions and Co-morbidities  Goal: Patient's chronic conditions and co-morbidity symptoms are monitored and maintained or improved  11/11/2024 0029 by Nani Mata, RN  Outcome: Progressing  Flowsheets (Taken 11/10/2024 2037)  Care Plan - Patient's Chronic Conditions and Co-Morbidity Symptoms are Monitored and Maintained or Improved:   Monitor and assess patient's chronic conditions and comorbid symptoms for stability, deterioration, or improvement   Collaborate with multidisciplinary team to address chronic and comorbid conditions and prevent exacerbation or deterioration   Update acute care plan with appropriate goals if chronic or comorbid symptoms are exacerbated and prevent overall improvement and discharge     Problem: Discharge Planning  Goal: Discharge to home or other facility with appropriate resources  11/11/2024 0029 by Nani Mata, RN  Outcome: Progressing  Flowsheets (Taken 11/10/2024 2037)  Discharge to home or other facility with appropriate resources:   Identify barriers to discharge with patient and caregiver   Arrange for needed discharge resources and transportation as appropriate   Identify discharge learning needs (meds, wound care, etc)   Refer to discharge planning if patient needs post-hospital services based on physician order or complex needs related to functional status, cognitive ability or social support system     Problem: Pain  Goal: Verbalizes/displays adequate comfort level or baseline comfort level  11/11/2024 0029 by Nani Mata, RN  Outcome: Progressing     Problem: Respiratory - Adult  Goal: Achieves optimal ventilation and oxygenation  Outcome: Progressing     Problem: Safety - Adult  Goal: Free from fall injury  Outcome: Progressing     Problem: Neurosensory - Adult  Goal: Achieves stable or improved neurological status  Outcome: Progressing     Problem: Cardiovascular - Adult  Goal: Maintains optimal cardiac output and hemodynamic

## 2024-11-11 NOTE — PROGRESS NOTES
Home O2 ordered for patient, but it is not required at this time since patient will be going to [facility]. Bedside RN is aware and will inform RT if anything changes.

## 2024-11-11 NOTE — PROGRESS NOTES
mobility training. Pt does not require continued PT at discharge.    Requires PT Follow-Up: Yes    Activity Tolerance  Activity Tolerance: Patient limited by endurance  Activity Tolerance Comments: Pt on 2L O2 initially for ambulation. Pt required one standing rest break lasting ~1' with initial O2 saturation at 88%, increased to 3L with saturation improving to 91%. Remaining gait distance with 3L O2. RN informed    Plan  Physical Therapy Plan  General Plan:  (5-6x/week)  Current Treatment Recommendations: Strengthening, Gait training, Stair training, Functional mobility training, Transfer training, Safety education & training, Endurance training, Therapeutic activities  Safety Devices  Type of Devices: Call light within reach, Gait belt, Left in chair, Nurse notified  Restraints  Restraints Initially in Place: No    Restrictions  Restrictions/Precautions  Restrictions/Precautions: General Precautions, Fall Risk  Required Braces or Orthoses?: No  Position Activity Restriction  Other position/activity restrictions: \"Up with assistance\"; chest tube, O2 via NC, pt OK to be off suction for walk     Subjective  General  Family / Caregiver Present: No  Follows Commands: Within Functional Limits  General Comment  Comments: Suction off chest tube, now to gravity  Subjective  Subjective: Pt denies pain. Pt reported increase weakness today                  Cognition   Orientation  Overall Orientation Status: Within Normal Limits  Orientation Level: Oriented X4  Cognition  Overall Cognitive Status: WNL    Objective                              Bed mobility  Supine to Sit: Modified independent (HOB ~30 degrees)  Scooting: Independent  Bed Mobility Comments: Pt sat on side of bed for ~8' independently    Transfers  Sit to Stand: Independent  Stand to Sit: Independent    Ambulation  Surface: Level tile  Device: No Device  Other Apparatus: O2 (2L O2 with increase to 3L)  Assistance: Contact guard assistance  Quality of Gait:  Staggering gait with pt reaching for railing in hallway frequently. Pt refusing use of assistive device. Pt reporting increase weakness this date.  Distance: 90ft  Comments: Pt on 2L O2 initially for ambulation. Pt required one standing rest break lasting ~1' with initial O2 saturation at 88%, increased to 3L with saturation improving to 91%. Remaining gait distance with 3L O2. RN informed    Stairs/Curb  Stairs?: No (platform step deferred due to pt's increase weakness and decrease balance this date)                OutComes Score                                                  AM-PAC - Mobility    AM-PAC Basic Mobility - Inpatient   How much help is needed turning from your back to your side while in a flat bed without using bedrails?: None  How much help is needed moving from lying on your back to sitting on the side of a flat bed without using bedrails?: A Little  How much help is needed moving to and from a bed to a chair?: A Little  How much help is needed standing up from a chair using your arms?: None  How much help is needed walking in hospital room?: A Little  How much help is needed climbing 3-5 steps with a railing?: A Little  [facility] Inpatient Mobility Raw Score : 20  AM-PAC Inpatient T-Scale Score : 47.67  Mobility Inpatient CMS 0-100% Score: 35.83  Mobility Inpatient CMS G-Code Modifier : CJ                Goals  Short Term Goals  Time Frame for Short Term Goals: 14 visits  Short Term Goal 1: supine to and from sit independent  Short Term Goal 2: sit to and from stand independent  Short Term Goal 3: ambulate 200ft no device independent  Short Term Goal 4: one step independently  Patient Goals   Patient Goals : to go home       Education  Patient Education  Education Given To: Patient  Education Provided: Role of Therapy;Plan of Care;Energy Conservation  Education Provided Comments: Pt currently refusing need for assistive device. Pt educated on 2L O2 at rest and 3L during activity  Education Method:

## 2024-11-11 NOTE — PROGRESS NOTES
Spiritual Health History and Assessment/Progress Note  [facility]    (P) Initial Encounter, Follow up,  ,      Name: Clary Harris MRN: [id number]    Age: 79 y.o.     Sex: female   Language: English   Rastafari: Samaritan   Pneumonia due to infectious agent     Date: 11/11/2024            Total Time Calculated: (P) 15 min              Spiritual Assessment began in [address]        Referral/Consult From: (P) Rounding   Encounter Overview/Reason: (P) Initial Encounter  Service Provided For: (P) Patient    Patient was on the phone several times before I was able to visit with her.  She has a lot of family and friends who support her.  She seems to be coping very well.  She she friendly and receptive to the visit.  She lives alone but her daughter lives down the street.    Paula, Belief, Meaning:   Patient identifies as spiritual, is connected with a paula tradition or spiritual practice, and has beliefs or practices that help with coping during difficult times  Family/Friends No family/friends present      Importance and Influence:  Patient has spiritual/personal beliefs that influence decisions regarding their health  Family/Friends No family/friends present    Community:  Patient is connected with a spiritual community and feels well-supported. Support system includes: Children, Friends, and Extended family  Family/Friends No family/friends present    Assessment and Plan of Care:     Patient Interventions include: Facilitated expression of thoughts and feelings  Family/Friends Interventions include: No family/friends present    Patient Plan of Care: Spiritual Care available upon further referral  Family/Friends Plan of Care: No family/friends present    Electronically signed by Chaplain Macario Intern on 11/11/2024 at 10:47 AM

## 2024-11-11 NOTE — PROGRESS NOTES
[facility] Medicine  IN-PATIENT SERVICE   [facility] - Location: [place]    Progress Note    11/11/2024    8:13 AM    Name:   Clary Harris  MRN:     [id number]     Acct:      [id number]   Room:   [address] [place] [address] Day:  9  Admit Date:  11/2/2024  9:06 PM    PCP:   Kandi Thompson DO  Code Status:  Full Code    Subjective:     Patient OK.  Does desaturate on exertion.  Otherwise comfortable at rest saturating mid 90s on 2 L NC.  Afebrile; VSS.  No additional output from chest tube.    Medications:     Allergies:    Allergies   Allergen Reactions    Furosemide     Hydrochlorothiazide     Sulfa Antibiotics Hives, Other (See Comments) and Itching       Current Meds:   Scheduled Meds:    cefTRIAXone (ROCEPHIN) IV  1,000 mg IntraVENous Q24H    metroNIDAZOLE  500 mg IntraVENous Q8H    insulin lispro  3-15 Units SubCUTAneous TID WC    Budeson-Glycopyrrol-Formoterol  2 puff Inhalation BID    levothyroxine  100 mcg Oral Daily    meloxicam  15 mg Oral Daily    traZODone  100 mg Oral Nightly    dextromethorphan  60 mg Oral 2 times per day    sodium chloride  80 mL IntraVENous Once    sodium chloride flush  5-40 mL IntraVENous 2 times per day    enoxaparin  40 mg SubCUTAneous Daily    amLODIPine  5 mg Oral Daily    valsartan  80 mg Oral Daily    carvedilol  6.25 mg Oral BID WC    citalopram  20 mg Oral Daily    pantoprazole  40 mg Oral QAM AC     Continuous Infusions:    dextrose      sodium chloride Stopped (11/07/24 0433)     PRN Meds: glucose, dextrose bolus **OR** dextrose bolus, glucagon (rDNA), dextrose, albuterol, benzonatate, sodium chloride flush, sodium chloride, potassium chloride **OR** potassium alternative oral replacement **OR** potassium chloride, magnesium sulfate, ondansetron **OR** ondansetron, polyethylene glycol, acetaminophen **OR** acetaminophen    Data:     Vitals:  /83   Pulse 50   Temp 98.8 °F (37.1 °C) (Oral)   Resp 22   Ht 1.651 m (5' 5\")   Wt 83.9 kg (185 lb)    persistent fluid and consolidation but improved S/P chest tube- chest tube no output yesterday or overnight- not sure there is really a \"tappable\" amount of residual fluid in R chest or not- defer to pulm- CONT rocephin/flagyl- suspect she is getting close to discharge in the next 1-2 days- Home O2 eval ordered  Acute elevated liver enzymes- improving- likely due to unsasyn- switched to rocephin/flagyl; crestor on hold currently USN showing mild steatosis- LFTs are progressively improving  T2DM- well controlled with ISS, usually on ozempic at home      Medical Decision Making: Medium    Disposition and Communication:     Lul Ansari MD  11/11/2024  8:13 AM

## 2024-11-11 NOTE — PLAN OF CARE
Problem: Chronic Conditions and Co-morbidities  Goal: Patient's chronic conditions and co-morbidity symptoms are monitored and maintained or improved  Outcome: Progressing  Flowsheets  Taken 11/11/2024 1600  Care Plan - Patient's Chronic Conditions and Co-Morbidity Symptoms are Monitored and Maintained or Improved: Monitor and assess patient's chronic conditions and comorbid symptoms for stability, deterioration, or improvement  Taken 11/11/2024 1300  Care Plan - Patient's Chronic Conditions and Co-Morbidity Symptoms are Monitored and Maintained or Improved: Monitor and assess patient's chronic conditions and comorbid symptoms for stability, deterioration, or improvement  Taken 11/11/2024 0830  Care Plan - Patient's Chronic Conditions and Co-Morbidity Symptoms are Monitored and Maintained or Improved: Monitor and assess patient's chronic conditions and comorbid symptoms for stability, deterioration, or improvement     Problem: Discharge Planning  Goal: Discharge to home or other facility with appropriate resources  Outcome: Progressing  Flowsheets  Taken 11/11/2024 1600  Discharge to home or other facility with appropriate resources:   Identify barriers to discharge with patient and caregiver   Arrange for needed discharge resources and transportation as appropriate   Identify discharge learning needs (meds, wound care, etc)  Taken 11/11/2024 1300  Discharge to home or other facility with appropriate resources:   Identify barriers to discharge with patient and caregiver   Arrange for needed discharge resources and transportation as appropriate   Identify discharge learning needs (meds, wound care, etc)  Taken 11/11/2024 0830  Discharge to home or other facility with appropriate resources:   Identify barriers to discharge with patient and caregiver   Arrange for needed discharge resources and transportation as appropriate   Identify discharge learning needs (meds, wound care, etc)     Problem: Pain  Goal:

## 2024-11-12 ENCOUNTER — HOSPITAL ENCOUNTER (OUTPATIENT)
Dept: CT IMAGING | Age: 79
Discharge: HOME | End: 2024-11-14
Payer: MEDICARE

## 2024-11-12 VITALS
DIASTOLIC BLOOD PRESSURE: 70 MMHG | HEART RATE: 59 BPM | RESPIRATION RATE: 16 BRPM | OXYGEN SATURATION: 98 % | SYSTOLIC BLOOD PRESSURE: 114 MMHG

## 2024-11-12 LAB
ALBUMIN SERPL-MCNC: 2.6 G/DL (ref 3.5–5.2)
ALBUMIN/GLOB SERPL: 0.8 {RATIO} (ref 1–2.5)
ALP SERPL-CCNC: 105 U/L (ref 35–104)
ALT SERPL-CCNC: 44 U/L (ref 5–33)
ANION GAP SERPL CALCULATED.3IONS-SCNC: 8 MMOL/L (ref 9–17)
AST SERPL-CCNC: 26 U/L
BASOPHILS # BLD: 0 K/UL (ref 0–0.2)
BASOPHILS NFR BLD: 0 % (ref 0–2)
BILIRUB SERPL-MCNC: 0.4 MG/DL (ref 0.3–1.2)
BUN SERPL-MCNC: 14 MG/DL (ref 8–23)
CALCIUM SERPL-MCNC: 8.7 MG/DL (ref 8.6–10.4)
CHLORIDE SERPL-SCNC: 104 MMOL/L (ref 98–107)
CO2 SERPL-SCNC: 30 MMOL/L (ref 20–31)
CREAT SERPL-MCNC: 0.6 MG/DL (ref 0.5–0.9)
EOSINOPHIL # BLD: 0.22 K/UL (ref 0–0.4)
EOSINOPHILS RELATIVE PERCENT: 2 % (ref 1–4)
ERYTHROCYTE [DISTWIDTH] IN BLOOD BY AUTOMATED COUNT: 15.3 % (ref 12.5–15.4)
GFR, ESTIMATED: >90 ML/MIN/1.73M2
GLUCOSE BLD-MCNC: 102 MG/DL (ref 65–105)
GLUCOSE BLD-MCNC: 124 MG/DL (ref 65–105)
GLUCOSE BLD-MCNC: 196 MG/DL (ref 65–105)
GLUCOSE BLD-MCNC: 230 MG/DL (ref 65–105)
GLUCOSE SERPL-MCNC: 114 MG/DL (ref 70–99)
HCT VFR BLD AUTO: 33.4 % (ref 36–46)
HGB BLD-MCNC: 10.9 G/DL (ref 12–16)
LYMPHOCYTES NFR BLD: 2 K/UL (ref 1–4.8)
LYMPHOCYTES RELATIVE PERCENT: 18 % (ref 24–44)
MCH RBC QN AUTO: 28.4 PG (ref 26–34)
MCHC RBC AUTO-ENTMCNC: 32.5 G/DL (ref 31–37)
MCV RBC AUTO: 87.4 FL (ref 80–100)
MONOCYTES NFR BLD: 1.11 K/UL (ref 0.1–0.8)
MONOCYTES NFR BLD: 10 % (ref 1–7)
MORPHOLOGY: NORMAL
NEUTROPHILS NFR BLD: 70 % (ref 36–66)
NEUTS SEG NFR BLD: 7.77 K/UL (ref 1.8–7.7)
PLATELET # BLD AUTO: 364 K/UL (ref 140–450)
PMV BLD AUTO: 7.6 FL (ref 6–12)
POTASSIUM SERPL-SCNC: 4.2 MMOL/L (ref 3.7–5.3)
PROT SERPL-MCNC: 5.9 G/DL (ref 6.4–8.3)
RBC # BLD AUTO: 3.82 M/UL (ref 4–5.2)
SODIUM SERPL-SCNC: 142 MMOL/L (ref 135–144)
WBC OTHER # BLD: 11.1 K/UL (ref 3.5–11)

## 2024-11-12 PROCEDURE — 82947 ASSAY GLUCOSE BLOOD QUANT: CPT

## 2024-11-12 PROCEDURE — 94761 N-INVAS EAR/PLS OXIMETRY MLT: CPT

## 2024-11-12 PROCEDURE — 85025 COMPLETE CBC W/AUTO DIFF WBC: CPT

## 2024-11-12 PROCEDURE — 80053 COMPREHEN METABOLIC PANEL: CPT

## 2024-11-12 PROCEDURE — 2060000000 HC ICU INTERMEDIATE R&B

## 2024-11-12 PROCEDURE — 83615 LACTATE (LD) (LDH) ENZYME: CPT

## 2024-11-12 PROCEDURE — 0WP9X0Z REMOVAL OF DRAINAGE DEVICE FROM RIGHT PLEURAL CAVITY, EXTERNAL APPROACH: ICD-10-PCS | Performed by: RADIOLOGY

## 2024-11-12 PROCEDURE — 87102 FUNGUS ISOLATION CULTURE: CPT

## 2024-11-12 PROCEDURE — 6370000000 HC RX 637 (ALT 250 FOR IP): Performed by: FAMILY MEDICINE

## 2024-11-12 PROCEDURE — 36415 COLL VENOUS BLD VENIPUNCTURE: CPT

## 2024-11-12 PROCEDURE — 88112 CYTOPATH CELL ENHANCE TECH: CPT

## 2024-11-12 PROCEDURE — 87075 CULTR BACTERIA EXCEPT BLOOD: CPT

## 2024-11-12 PROCEDURE — 84157 ASSAY OF PROTEIN OTHER: CPT

## 2024-11-12 PROCEDURE — 89051 BODY FLUID CELL COUNT: CPT

## 2024-11-12 PROCEDURE — 87015 SPECIMEN INFECT AGNT CONCNTJ: CPT

## 2024-11-12 PROCEDURE — 99232 SBSQ HOSP IP/OBS MODERATE 35: CPT | Performed by: STUDENT IN AN ORGANIZED HEALTH CARE EDUCATION/TRAINING PROGRAM

## 2024-11-12 PROCEDURE — C1729 CATH, DRAINAGE: HCPCS

## 2024-11-12 PROCEDURE — 6370000000 HC RX 637 (ALT 250 FOR IP): Performed by: NURSE PRACTITIONER

## 2024-11-12 PROCEDURE — 87116 MYCOBACTERIA CULTURE: CPT

## 2024-11-12 PROCEDURE — 87205 SMEAR GRAM STAIN: CPT

## 2024-11-12 PROCEDURE — 2700000000 HC OXYGEN THERAPY PER DAY

## 2024-11-12 PROCEDURE — 6360000002 HC RX W HCPCS: Performed by: RADIOLOGY

## 2024-11-12 PROCEDURE — 6360000002 HC RX W HCPCS: Performed by: FAMILY MEDICINE

## 2024-11-12 PROCEDURE — 82945 GLUCOSE OTHER FLUID: CPT

## 2024-11-12 PROCEDURE — 2580000003 HC RX 258: Performed by: RADIOLOGY

## 2024-11-12 PROCEDURE — 6360000002 HC RX W HCPCS: Performed by: INTERNAL MEDICINE

## 2024-11-12 PROCEDURE — 88305 TISSUE EXAM BY PATHOLOGIST: CPT

## 2024-11-12 PROCEDURE — 0W9B30Z DRAINAGE OF LEFT PLEURAL CAVITY WITH DRAINAGE DEVICE, PERCUTANEOUS APPROACH: ICD-10-PCS | Performed by: PHYSICIAN ASSISTANT

## 2024-11-12 PROCEDURE — 83986 ASSAY PH BODY FLUID NOS: CPT

## 2024-11-12 PROCEDURE — 87206 SMEAR FLUORESCENT/ACID STAI: CPT

## 2024-11-12 PROCEDURE — 2580000003 HC RX 258: Performed by: FAMILY MEDICINE

## 2024-11-12 PROCEDURE — 87070 CULTURE OTHR SPECIMN AEROBIC: CPT

## 2024-11-12 RX ORDER — SODIUM CHLORIDE 0.9 % (FLUSH) 0.9 %
SYRINGE (ML) INJECTION PRN
Status: COMPLETED | OUTPATIENT
Start: 2024-11-12 | End: 2024-11-12

## 2024-11-12 RX ORDER — FENTANYL CITRATE 0.05 MG/ML
INJECTION, SOLUTION INTRAMUSCULAR; INTRAVENOUS PRN
Status: COMPLETED | OUTPATIENT
Start: 2024-11-12 | End: 2024-11-12

## 2024-11-12 RX ADMIN — LEVOTHYROXINE SODIUM 100 MCG: 50 TABLET ORAL at 09:25

## 2024-11-12 RX ADMIN — SODIUM CHLORIDE, PRESERVATIVE FREE 10 ML: 5 INJECTION INTRAVENOUS at 20:34

## 2024-11-12 RX ADMIN — SODIUM CHLORIDE, PRESERVATIVE FREE 10 ML: 5 INJECTION INTRAVENOUS at 16:25

## 2024-11-12 RX ADMIN — METRONIDAZOLE 500 MG: 500 INJECTION, SOLUTION INTRAVENOUS at 20:50

## 2024-11-12 RX ADMIN — AMLODIPINE BESYLATE 5 MG: 5 TABLET ORAL at 09:20

## 2024-11-12 RX ADMIN — FENTANYL CITRATE 50 MCG: 0.05 INJECTION, SOLUTION INTRAMUSCULAR; INTRAVENOUS at 16:46

## 2024-11-12 RX ADMIN — ACETAMINOPHEN 650 MG: 325 TABLET ORAL at 20:33

## 2024-11-12 RX ADMIN — MELOXICAM 15 MG: 7.5 TABLET ORAL at 09:20

## 2024-11-12 RX ADMIN — CITALOPRAM HYDROBROMIDE 20 MG: 20 TABLET ORAL at 09:20

## 2024-11-12 RX ADMIN — PANTOPRAZOLE SODIUM 40 MG: 40 TABLET, DELAYED RELEASE ORAL at 09:25

## 2024-11-12 RX ADMIN — ENOXAPARIN SODIUM 40 MG: 100 INJECTION SUBCUTANEOUS at 09:20

## 2024-11-12 RX ADMIN — FENTANYL CITRATE 50 MCG: 0.05 INJECTION, SOLUTION INTRAMUSCULAR; INTRAVENOUS at 16:24

## 2024-11-12 RX ADMIN — DEXTROMETHORPHAN POLISTIREX 60 MG: 30 SUSPENSION ORAL at 20:35

## 2024-11-12 RX ADMIN — VALSARTAN 80 MG: 160 TABLET, FILM COATED ORAL at 09:20

## 2024-11-12 RX ADMIN — CARVEDILOL 6.25 MG: 3.12 TABLET, FILM COATED ORAL at 20:34

## 2024-11-12 RX ADMIN — TRAZODONE HYDROCHLORIDE 100 MG: 100 TABLET ORAL at 23:21

## 2024-11-12 RX ADMIN — METRONIDAZOLE 500 MG: 500 INJECTION, SOLUTION INTRAVENOUS at 04:23

## 2024-11-12 RX ADMIN — CARVEDILOL 6.25 MG: 3.12 TABLET, FILM COATED ORAL at 09:20

## 2024-11-12 RX ADMIN — Medication 1000 MG: at 20:41

## 2024-11-12 ASSESSMENT — PAIN DESCRIPTION - ORIENTATION
ORIENTATION: RIGHT
ORIENTATION: RIGHT;POSTERIOR

## 2024-11-12 ASSESSMENT — PAIN SCALES - GENERAL
PAINLEVEL_OUTOF10: 2
PAINLEVEL_OUTOF10: 8
PAINLEVEL_OUTOF10: 2
PAINLEVEL_OUTOF10: 8

## 2024-11-12 ASSESSMENT — PAIN DESCRIPTION - LOCATION
LOCATION: BACK
LOCATION: BACK

## 2024-11-12 ASSESSMENT — PAIN DESCRIPTION - DESCRIPTORS
DESCRIPTORS: ACHING;DISCOMFORT
DESCRIPTORS: SORE

## 2024-11-12 ASSESSMENT — PAIN DESCRIPTION - PAIN TYPE: TYPE: SURGICAL PAIN

## 2024-11-12 NOTE — PROGRESS NOTES
Pulmonary update.    I did speak to Dr. Guevara Wilson interventional radiologist at [facility].  He tells me he is willing to place 2 pleural catheters in the spaces that are deemed abnormal.  I asked him if he can pull the chest tube out as well    I did place the order per his recommendation.  I also placed 2 sets of orders for pleural fluid analysis.    Appreciate Raysa MALONEY RN's efforts.    I was told the patient is willing to undergo the procedure    Jose Ramon Oh MD

## 2024-11-12 NOTE — PROGRESS NOTES
[facility] Medicine  IN-PATIENT SERVICE   [facility]     Progress Note    11/12/2024    11:00 AM    Name:   Clary Harris  MRN:     [id number]     Acct:      [id number]   Room:   [address] [place] [address] Day:  10  Admit Date:  11/2/2024  9:06 PM    PCP:   Kandi Thompson DO  Code Status:  Full Code    Subjective:     Doing well this morning. Not really having any Sx. Still on 2LNC. Planning to get chest tube out today and place some drains. Pt is agreeable.     Medications:     Allergies:    Allergies   Allergen Reactions    Furosemide     Hydrochlorothiazide     Sulfa Antibiotics Hives, Other (See Comments) and Itching       Current Meds:   Scheduled Meds:    cefTRIAXone (ROCEPHIN) IV  1,000 mg IntraVENous Q24H    metroNIDAZOLE  500 mg IntraVENous Q8H    insulin lispro  3-15 Units SubCUTAneous TID WC    Budeson-Glycopyrrol-Formoterol  2 puff Inhalation BID    levothyroxine  100 mcg Oral Daily    meloxicam  15 mg Oral Daily    traZODone  100 mg Oral Nightly    dextromethorphan  60 mg Oral 2 times per day    sodium chloride  80 mL IntraVENous Once    sodium chloride flush  5-40 mL IntraVENous 2 times per day    [Held by provider] enoxaparin  40 mg SubCUTAneous Daily    amLODIPine  5 mg Oral Daily    valsartan  80 mg Oral Daily    carvedilol  6.25 mg Oral BID WC    citalopram  20 mg Oral Daily    pantoprazole  40 mg Oral QAM AC     Continuous Infusions:    dextrose      sodium chloride Stopped (11/07/24 0433)     PRN Meds: glucose, dextrose bolus **OR** dextrose bolus, glucagon (rDNA), dextrose, albuterol, benzonatate, sodium chloride flush, sodium chloride, potassium chloride **OR** potassium alternative oral replacement **OR** potassium chloride, magnesium sulfate, ondansetron **OR** ondansetron, polyethylene glycol, acetaminophen **OR** acetaminophen    Data:     Vitals:  /83   Pulse 75   Temp 98.4 °F (36.9 °C) (Oral)   Resp 18   Ht 1.651 m (5' 5\")   Wt 83.9 kg (185 lb)   SpO2 91%   BMI

## 2024-11-12 NOTE — CARE COORDINATION
Request faxed  to [facility] for transport by stretch [facility] @2pm to [place] [id number] CM spoke  with Nicki confirmed transport confirmed 2pm transport.   ANA MARIA spoke with Nicki @ [facility] corrected transport is  to [facility].

## 2024-11-12 NOTE — FLOWSHEET NOTE
Patient received into IR clinic room via stretcher per EMS from [facility] inpatient.  Patient scheduled for dual pleural drains with Dr. Wislon. V/S & assessment documented, patient resting in bed semi fowlers in NAD.

## 2024-11-12 NOTE — PROGRESS NOTES
Pulmonary Progress Note  [facility] Pulmonary and Critical Care Specialists  Dr Lopez Lynch/Dr Jose Ramon Brewer/Dr Won ROJAS AGACN-BC  Elisabet ROJAS NP-C      Patient - Clary Harris,  Age - 79 y.o.    - 1945      Room Number - 343/343-01   [facility] -  [id number]   [facility] # - [id number]  Date of Admission -  2024  9:06 PM        Consulting Service/Physician   Consulting - Luis Galvan MD  Primary Care Physician - Kandi Thompson DO     SUBJECTIVE   Patient evaluated at chair side with patient sitting up in no acute distress and reclining chair.  Patient reports that her breathing feels better today and denies having any pain.  She also reports that she is not having any new symptoms of chest pain, abdominal pain, nausea, vomiting, cough, lower extremity swelling, lower extremity pain    Patient reports that she is going to be transferring over to [facility] today and has been compliant and not eating or drinking.  She reports that she has been able to eat and drink normally before today and denies any problems with bowel or bladder function.  Patient notes that she has been able to walk through the hallway with assistance from nursing and notes only mild dyspnea with exertion.  Remains on supplemental oxygen at this time.    OBJECTIVE   VITALS    height is 1.651 m (5' 5\") and weight is 83.9 kg (185 lb). Her oral temperature is 98.4 °F (36.9 °C). Her blood pressure is 139/83 and her pulse is 75. Her respiration is 18 and oxygen saturation is 91%.     Body mass index is 30.79 kg/m².  Temperature Range: Temp: 98.4 °F (36.9 °C) Temp  Av.4 °F (36.9 °C)  Min: 97.8 °F (36.6 °C)  Max: 98.7 °F (37.1 °C)  BP Range:  Systolic (24hrs), Av , Min:127 , Max:139     Diastolic (24hrs), Av, Min:67, Max:91    Pulse Range: Pulse  Av.3  Min: 70  Max: 75  Respiration Range: Resp  Av  Min: 18  Max: 18  Current Pulse  previous sleep study at [facility], severity unknown, was on a CPAP for several years but simply stopped using it and turned it back into the [employer], she did not recall the name of the [employer] company leukocytosis related to pneumonia  Former tobacco use, quitting 30 years ago, 22 pack year history age 19 to age 41 approximately 1 pack/day she tells me  Leukocytosis, improving  Elevated liver enzymes felt to be potentially related to side effect of Unasyn, improved  Mild hypernatremia, resolved  Mild hypokalemia, resolved  Full Code     Has evidence of a loculated right pleural effusion.  It does not seem to be in connection with the current chest tube at this time.  Plan for CT-guided chest tube procedure at [facility] for elimination of 2 areas of posterior pockets to the right lung.  Additional lab testing to be performed on pleural drainage  Antibiotic therapy continued with Rocephin and Flagyl  Patient to continue using home Breztri inhaler  Lovenox DVT prophylaxis      Electronically signed by Michael Lara DO on 11/12/2024 at 10:07 AM    Patient's clinical status discussed with me.  I was not able to see the patient today as during rounds, the patient was at [facility] Interventional suite.  Patient is undergoing CT guided pleural cath placements and 2 of the areas where there are loculated effusions.  I did ask Dr. Guevara Wilson to proceed with pulling the existing chest tube.  Plans for the intervention were done earlier this morning while I was at [facility]--- I ordered the pleural fluid studies to be sent prior to the patient be transferred to [facility]    Collaborated with bedside nurse, Raysa MALONEY RN who informed me that the patient is okay with the procedures.    On Metronidazole and Ceftriaxone    Jose Ramon Oh MD

## 2024-11-12 NOTE — CARE COORDINATION
[facility] Quality Flow/Interdisciplinary Rounds Progress Note    Quality Flow Rounds held on November 12, 2024 at 0930    Disciplines Attending:  Bedside Nurse, , , and Nursing Unit Leadership    Barriers to Discharge:  Bilateral Plueral drains  Anticipated Discharge Date:  TBD     Anticipated Discharge Disposition: LTAC vs Home Care    Readmission Risk              Risk of Unplanned Readmission:  11           Discussed patient goal for the day, patient clinical progression, and barriers to discharge.        Kim Whiteside RN  November 12, 2024

## 2024-11-12 NOTE — PROGRESS NOTES
Physical Therapy        Physical Therapy Cancel Note      DATE: 2024    NAME: Clary Harris  MRN: [id number]   : 1945      Patient not seen this date for Physical Therapy due to:    Other: pt TIFFANIE at [facility] for chest tube procedure.      Electronically signed by Amirah Harris PT on 2024 at 2:36 PM

## 2024-11-12 NOTE — PLAN OF CARE
Problem: Chronic Conditions and Co-morbidities  Goal: Patient's chronic conditions and co-morbidity symptoms are monitored and maintained or improved  11/12/2024 0030 by Nani Mata RN  Outcome: Progressing  Flowsheets (Taken 11/11/2024 2000)  Care Plan - Patient's Chronic Conditions and Co-Morbidity Symptoms are Monitored and Maintained or Improved:   Monitor and assess patient's chronic conditions and comorbid symptoms for stability, deterioration, or improvement   Collaborate with multidisciplinary team to address chronic and comorbid conditions and prevent exacerbation or deterioration   Update acute care plan with appropriate goals if chronic or comorbid symptoms are exacerbated and prevent overall improvement and discharge     Problem: Discharge Planning  Goal: Discharge to home or other facility with appropriate resources  11/12/2024 0030 by Nani Mata RN  Outcome: Progressing  Flowsheets (Taken 11/11/2024 2000)  Discharge to home or other facility with appropriate resources:   Identify barriers to discharge with patient and caregiver   Arrange for needed discharge resources and transportation as appropriate   Identify discharge learning needs (meds, wound care, etc)   Refer to discharge planning if patient needs post-hospital services based on physician order or complex needs related to functional status, cognitive ability or social support system     Problem: Pain  Goal: Verbalizes/displays adequate comfort level or baseline comfort level  11/12/2024 0030 by Nani Mata RN  Outcome: Progressing     Problem: Respiratory - Adult  Goal: Achieves optimal ventilation and oxygenation  11/12/2024 0030 by Nani Mata RN  Outcome: Progressing  Flowsheets (Taken 11/11/2024 2000)  Achieves optimal ventilation and oxygenation:   Assess for changes in respiratory status   Assess for changes in mentation and behavior   Position to facilitate oxygenation and minimize respiratory effort   Oxygen  Adult  Goal: Maintains hematologic stability  11/12/2024 0030 by Nani Mata, RN  Outcome: Progressing  Flowsheets (Taken 11/11/2024 2000)  Maintains hematologic stability:   Assess for signs and symptoms of bleeding or hemorrhage   Monitor labs for bleeding or clotting disorders   Administer blood products/factors as ordered     Problem: Skin/Tissue Integrity  Goal: Absence of new skin breakdown  Description: 1.  Monitor for areas of redness and/or skin breakdown  2.  Assess vascular access sites hourly  3.  Every 4-6 hours minimum:  Change oxygen saturation probe site  4.  Every 4-6 hours:  If on nasal continuous positive airway pressure, respiratory therapy assess nares and determine need for appliance change or resting period.  11/12/2024 0030 by Nani Mata, RN  Outcome: Progressing

## 2024-11-12 NOTE — BRIEF OP NOTE
Brief Postoperative Note for Chest Tube    Clary Harris  YOB: 1945  [id number]    Pre-operative Diagnosis: left parapneumonia effusion      Post-operative Diagnosis: Same    Procedure: Chest Tube Placement x 2     Medication Given: fentanyl    Anesthesia: 1% Lidocaine     Surgeons/Assistants:  Dr. Wilson and Keyonna Rawls PA-C    Complications: none    EBL: Minimal    Specimens: were obtained    Successful placement of 8 fr x 25 cm M drain pigtail chest tubes x 2 placed posteriorly within the left hemithorax. Approximately 55 mL of yellow fluid obtained from the superior catheter, while the inferior catheter obtained 5 mL due to body positioning. Most likely once the patient is sitting upright, the inferior catheter will drain more fluid.   Catheters were sutured in place and occlusive dressings were applied. Placed to atrium. Previous chest tube was removed during procedure.  Patient tolerated procedure well.     Electronically signed by Keyonna Rawls PA-C on 11/12/2024 at 5:38 PM

## 2024-11-13 ENCOUNTER — APPOINTMENT (OUTPATIENT)
Dept: GENERAL RADIOLOGY | Age: 79
DRG: 193 | End: 2024-11-13
Payer: MEDICARE

## 2024-11-13 PROBLEM — G47.33 OBSTRUCTIVE SLEEP APNEA: Status: ACTIVE | Noted: 2023-12-06

## 2024-11-13 PROBLEM — J91.8 PLEURAL EFFUSION ASSOCIATED WITH PULMONARY INFECTION: Status: ACTIVE | Noted: 2024-11-13

## 2024-11-13 PROBLEM — J18.9 PLEURAL EFFUSION ASSOCIATED WITH PULMONARY INFECTION: Status: ACTIVE | Noted: 2024-11-13

## 2024-11-13 PROBLEM — E46 PROTEIN CALORIE MALNUTRITION (HCC): Status: ACTIVE | Noted: 2024-11-13

## 2024-11-13 LAB
APPEARANCE FLD: NORMAL
CASE NUMBER:: NORMAL
CLOT CHECK: NORMAL
COLOR FLD: YELLOW
GLUCOSE BLD-MCNC: 125 MG/DL (ref 65–105)
GLUCOSE BLD-MCNC: 168 MG/DL (ref 65–105)
GLUCOSE BLD-MCNC: 246 MG/DL (ref 65–105)
GLUCOSE BLD-MCNC: 95 MG/DL (ref 65–105)
GLUCOSE FLD-MCNC: 108 MG/DL
LDH FLD L TO P-CCNC: 1169 U/L
LYMPHOCYTES NFR FLD: 1 %
MANUAL DIF COMMENT FLD-IMP: NORMAL
MICROORGANISM SPEC CULT: NORMAL
MICROORGANISM SPEC CULT: NORMAL
MICROORGANISM/AGENT SPEC: NORMAL
MICROORGANISM/AGENT SPEC: NORMAL
MONOCYTES NFR FLD: 10 %
NEUTROPHILS NFR FLD: 89 %
NUC CELL # FLD: 6330 CELLS/UL
PH FLUID: 8
PROT FLD-MCNC: 4.1 G/DL
RBC # FLD: 9000 CELLS/UL
SPECIMEN DESCRIPTION: NORMAL

## 2024-11-13 PROCEDURE — 97168 OT RE-EVAL EST PLAN CARE: CPT

## 2024-11-13 PROCEDURE — 2580000003 HC RX 258: Performed by: FAMILY MEDICINE

## 2024-11-13 PROCEDURE — 6360000002 HC RX W HCPCS: Performed by: FAMILY MEDICINE

## 2024-11-13 PROCEDURE — 99233 SBSQ HOSP IP/OBS HIGH 50: CPT | Performed by: FAMILY MEDICINE

## 2024-11-13 PROCEDURE — 6370000000 HC RX 637 (ALT 250 FOR IP): Performed by: FAMILY MEDICINE

## 2024-11-13 PROCEDURE — 97116 GAIT TRAINING THERAPY: CPT

## 2024-11-13 PROCEDURE — 2060000000 HC ICU INTERMEDIATE R&B

## 2024-11-13 PROCEDURE — 71045 X-RAY EXAM CHEST 1 VIEW: CPT

## 2024-11-13 PROCEDURE — 94761 N-INVAS EAR/PLS OXIMETRY MLT: CPT

## 2024-11-13 PROCEDURE — 82947 ASSAY GLUCOSE BLOOD QUANT: CPT

## 2024-11-13 PROCEDURE — 6360000002 HC RX W HCPCS: Performed by: INTERNAL MEDICINE

## 2024-11-13 PROCEDURE — 6370000000 HC RX 637 (ALT 250 FOR IP): Performed by: NURSE PRACTITIONER

## 2024-11-13 PROCEDURE — 2700000000 HC OXYGEN THERAPY PER DAY

## 2024-11-13 RX ADMIN — TRAZODONE HYDROCHLORIDE 100 MG: 100 TABLET ORAL at 23:47

## 2024-11-13 RX ADMIN — VALSARTAN 80 MG: 160 TABLET, FILM COATED ORAL at 08:52

## 2024-11-13 RX ADMIN — ACETAMINOPHEN 650 MG: 325 TABLET ORAL at 23:46

## 2024-11-13 RX ADMIN — METRONIDAZOLE 500 MG: 500 INJECTION, SOLUTION INTRAVENOUS at 04:59

## 2024-11-13 RX ADMIN — ACETAMINOPHEN 650 MG: 325 TABLET ORAL at 16:54

## 2024-11-13 RX ADMIN — ENOXAPARIN SODIUM 40 MG: 100 INJECTION SUBCUTANEOUS at 08:55

## 2024-11-13 RX ADMIN — LEVOTHYROXINE SODIUM 100 MCG: 50 TABLET ORAL at 08:51

## 2024-11-13 RX ADMIN — CARVEDILOL 6.25 MG: 3.12 TABLET, FILM COATED ORAL at 08:51

## 2024-11-13 RX ADMIN — INSULIN LISPRO 5 UNITS: 100 INJECTION, SOLUTION INTRAVENOUS; SUBCUTANEOUS at 13:18

## 2024-11-13 RX ADMIN — METRONIDAZOLE 500 MG: 500 INJECTION, SOLUTION INTRAVENOUS at 15:13

## 2024-11-13 RX ADMIN — AMLODIPINE BESYLATE 5 MG: 5 TABLET ORAL at 08:52

## 2024-11-13 RX ADMIN — SODIUM CHLORIDE, PRESERVATIVE FREE 10 ML: 5 INJECTION INTRAVENOUS at 20:49

## 2024-11-13 RX ADMIN — DEXTROMETHORPHAN POLISTIREX 60 MG: 30 SUSPENSION ORAL at 20:48

## 2024-11-13 RX ADMIN — METRONIDAZOLE 500 MG: 500 INJECTION, SOLUTION INTRAVENOUS at 20:47

## 2024-11-13 RX ADMIN — Medication 1000 MG: at 13:17

## 2024-11-13 RX ADMIN — CITALOPRAM HYDROBROMIDE 20 MG: 20 TABLET ORAL at 08:52

## 2024-11-13 RX ADMIN — MELOXICAM 15 MG: 7.5 TABLET ORAL at 08:52

## 2024-11-13 RX ADMIN — PANTOPRAZOLE SODIUM 40 MG: 40 TABLET, DELAYED RELEASE ORAL at 08:52

## 2024-11-13 ASSESSMENT — PAIN SCALES - GENERAL
PAINLEVEL_OUTOF10: 0
PAINLEVEL_OUTOF10: 3

## 2024-11-13 ASSESSMENT — PAIN DESCRIPTION - ORIENTATION: ORIENTATION: RIGHT

## 2024-11-13 ASSESSMENT — PAIN DESCRIPTION - LOCATION: LOCATION: RIB CAGE

## 2024-11-13 ASSESSMENT — PAIN DESCRIPTION - DESCRIPTORS: DESCRIPTORS: SHARP

## 2024-11-13 NOTE — PROGRESS NOTES
Occupational Therapy  Occupational Therapy Initial Evaluation  Facility/Department: [address]   Patient Name: Clary Harris        MRN: [id number]    : 1945    Date of Service: 2024    Discharge Recommendations  Discharge Recommendations: Defer OT at this time  OT Equipment Recommendations  Other: AE/DME recommnedations TBD    Chief Complaint   Patient presents with    Shortness of Breath    Chest Pain     Onset Wednesday    Chills     Past Medical History:  has a past medical history of COPD (chronic obstructive pulmonary disease) (HCC), Diverticulosis, DJD (degenerative joint disease), Hypertension, Hypertriglyceridemia, Hypothyroidism, Mild valvular heart disease, Osteoarthritis, Sleep apnea, Type 2 diabetes mellitus without complication (HCC), and Vertigo.  Past Surgical History:  has a past surgical history that includes Tubal ligation; Cholecystectomy; Colonoscopy; Breast biopsy; and IR CHEST TUBE INSERTION (2024).    Assessment  Performance deficits / Impairments: Decreased functional mobility ;Decreased endurance;Decreased balance;Decreased ADL status  Assessment: Pt seen for therapy re-eval s/p chest tube placement x2 on 24. PLOF includes IND all ADLs and IADLs including driving with no use of AE/DME.  At this time, pt is IND ADL transfers, CGA functional mobility with no device, SUP toileting, SBA standing grooming and appears to be safe to return to PLOF with assist as needed. Pt would benefit from skilled OT services during hospitalization however will likely not be in need of OT services following discharge.  Prognosis: Good  Decision Making: Medium Complexity  Activity Tolerance  Activity Tolerance: Patient Tolerated treatment well  Safety Devices  Type of Devices: Gait belt;Nurse notified;Call light within reach;Left in chair  Restraints  Restraints Initially in Place: No    Restrictions/Precautions  Restrictions/Precautions  Restrictions/Precautions: General

## 2024-11-13 NOTE — CARE COORDINATION
CM spoke with Kevin Gutierres, per Dorian Jacques they can accept patient if plueral drain require wall suction,  will follow    1615 CM spoke with daughter Milagro  regarding SNF and HHC choices, awaiting choices, emailed list for HHC to [email or web address]                       Post Acute Facility/Agency List     Provided child with the following list, the list includes the overall star ratings obtained from CMS per the Medicare Web site (www.Medicare.gov):     [] Long Term Acute Care Facilities  [] Acute Inpatient Rehabilitation Facilities  [] Skilled Nursing Facilities  [] Hospice Facilities  [x] Home Care    Provided verbal instructions on how to utilize the QR Code to obtain additional detailed star ratings from www.Medicare.gov     offered to print and provide the detailed list:    []Accepted   [x]Declined

## 2024-11-13 NOTE — PROGRESS NOTES
0925- Writer received call from IR stating that they could get the patient in at [facility] today at 11 for pigtail placement. Writer notified them that there had not been an order for pigtail placement and patient just received a dose of lovenox and had ate breakfast. IR stated that they will speak with their radiologist and call writer back.    0940- Writer spoke with IR who stated that the radiologist would still perform the procedure today if we could get her to [facility] by 1500 and an order was placed.    0944- Writer paged Dr. Oh regarding the need for an order for the pigtail placements if he wanted one placed.    0948- Writer spoke with Dr. Oh about the case. He stated he never received a call from radiology last night regarding  the case so that is why the order was never placed. He requested the writer to get the number for the radiologist so he could discuss the case with him    0961 - writer paged Dr. Oh with Dr. Wallace phone number.    6002- Dr. Oh placed order for CT guided chest tube placement. Writer notified case management that we needed transport set up to get patient to [facility] by 1500.
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
## 2024-11-13 NOTE — PROGRESS NOTES
Physical Therapy  Facility/Department: [address]  Physical Therapy Daily Treatment Note    Name: Clary Harris  : 1945  MRN: [id number]  Date of Service: 2024    Discharge Recommendations:  No therapy recommended at discharge   PT Equipment Recommendations  Equipment Needed: No      Patient Diagnosis(es): The primary encounter diagnosis was Chest pain, unspecified type. Diagnoses of Dyspnea, unspecified type, Hypoxia, Pneumonia of both lower lobes due to infectious organism, and Chronic obstructive pulmonary disease, unspecified COPD type (HCC) were also pertinent to this visit.  Past Medical History:  has a past medical history of COPD (chronic obstructive pulmonary disease) (HCC), Diverticulosis, DJD (degenerative joint disease), Hypertension, Hypertriglyceridemia, Hypothyroidism, Mild valvular heart disease, Osteoarthritis, Sleep apnea, Type 2 diabetes mellitus without complication (HCC), and Vertigo.  Past Surgical History:  has a past surgical history that includes Tubal ligation; Cholecystectomy; Colonoscopy; Breast biopsy; and IR CHEST TUBE INSERTION (2024).    Assessment  Body Structures, Functions, Activity Limitations Requiring Skilled Therapeutic Intervention: Decreased functional mobility ;Decreased safe awareness;Decreased endurance    Assessment: Pt presents with community acquired pneumonia and currently has a chest tube. At baseline, pt lives alone and was independent gait no device, ind ADL's and all IADL's including yard work. Pt currently demonstrating independence with bed mobility, ind transfers, pt ambulated 90ft and 70ft no device with SBA. Pt on 3L O2 for ambulation and 2L at rest. Pt reports improved endurance this date. Pt demonstrates adequate safety for return to prior living situation. Pt will benefit from continued skilled PT while in the hospital for endurance, safety and functional mobility training. Pt does not require continued PT at discharge.    Requires PT

## 2024-11-13 NOTE — PLAN OF CARE
Problem: Chronic Conditions and Co-morbidities  Goal: Patient's chronic conditions and co-morbidity symptoms are monitored and maintained or improved  11/13/2024 1546 by Lul Lazo  Outcome: Progressing  11/13/2024 0359 by Nani Mata RN  Outcome: Progressing  Flowsheets (Taken 11/12/2024 2000)  Care Plan - Patient's Chronic Conditions and Co-Morbidity Symptoms are Monitored and Maintained or Improved:   Monitor and assess patient's chronic conditions and comorbid symptoms for stability, deterioration, or improvement   Collaborate with multidisciplinary team to address chronic and comorbid conditions and prevent exacerbation or deterioration   Update acute care plan with appropriate goals if chronic or comorbid symptoms are exacerbated and prevent overall improvement and discharge     Problem: Discharge Planning  Goal: Discharge to home or other facility with appropriate resources  11/13/2024 1546 by Lul Lazo  Outcome: Progressing  11/13/2024 0359 by Nani Mata, RN  Outcome: Progressing  Flowsheets (Taken 11/12/2024 2000)  Discharge to home or other facility with appropriate resources:   Identify barriers to discharge with patient and caregiver   Arrange for needed discharge resources and transportation as appropriate   Identify discharge learning needs (meds, wound care, etc)   Refer to discharge planning if patient needs post-hospital services based on physician order or complex needs related to functional status, cognitive ability or social support system     Problem: Discharge Planning  Goal: Discharge to home or other facility with appropriate resources  11/13/2024 1546 by Lul Lazo  Outcome: Progressing  11/13/2024 0359 by Nani Mata, RN  Outcome: Progressing  Flowsheets (Taken 11/12/2024 2000)  Discharge to home or other facility with appropriate resources:   Identify barriers to discharge with patient and caregiver   Arrange for needed discharge resources and transportation  needed   Apply continuous passive motion per provider or physical therapy orders to increase flexion toward goal   Instruct patient/family in ordered activity level  Goal: Maintain proper alignment of affected body part  11/13/2024 1546 by Lul Lazo  Outcome: Progressing  11/13/2024 0359 by Nani Mata RN  Outcome: Progressing     Problem: Musculoskeletal - Adult  Goal: Maintain proper alignment of affected body part  11/13/2024 1546 by Lul Lazo  Outcome: Progressing  11/13/2024 0359 by Nani Mata RN  Outcome: Progressing     Problem: Gastrointestinal - Adult  Goal: Minimal or absence of nausea and vomiting  11/13/2024 1546 by Lul Lazo  Outcome: Progressing  11/13/2024 0359 by Nani Mata RN  Outcome: Progressing     Problem: Infection - Adult  Goal: Absence of infection at discharge  11/13/2024 1546 by Lul Lazo  Outcome: Progressing  11/13/2024 0359 by Nani Mata RN  Outcome: Progressing  Flowsheets (Taken 11/12/2024 2000)  Absence of infection at discharge:   Assess and monitor for signs and symptoms of infection   Monitor lab/diagnostic results   Monitor all insertion sites i.e., indwelling lines, tubes and drains   [place] appropriate cooling/warming therapies per order   Administer medications as ordered   Instruct and encourage patient and family to use good hand hygiene technique   Identify and instruct in appropriate isolation precautions for identified infection/condition     Problem: Metabolic/Fluid and Electrolytes - Adult  Goal: Electrolytes maintained within normal limits  11/13/2024 1546 by Lul Lazo  Outcome: Progressing  11/13/2024 0359 by Nani Mata RN  Outcome: Progressing  Flowsheets (Taken 11/12/2024 2000)  Electrolytes maintained within normal limits:   Monitor labs and assess patient for signs and symptoms of electrolyte imbalances   Administer electrolyte replacement as ordered   Monitor response to electrolyte replacements,

## 2024-11-13 NOTE — PROGRESS NOTES
Pulmonary Progress Note  [facility]      Patient - Clary Harris,  Age - 79 y.o.    - 1945      Room Number - 343/343-01   N -  [id number]   [facility] # - [id number]  Date of Admission -  2024  9:06 PM        Consulting Service/Physician   Consulting - Luis Galvan MD  Primary Care Physician - Kandi Thompson DO     SUBJECTIVE   Looks comfortable, top chest tube was not open to suction.  No chest x-ray was done today ordered 1 stat.  She is sitting in a chair saturating nicely.  She denies any pain.  Lower chest tube is only drained less than 50 mL     OBJECTIVE   VITALS    height is 1.651 m (5' 5\") and weight is 83.9 kg (185 lb). Her oral temperature is 97.7 °F (36.5 °C). Her blood pressure is 108/69 and her pulse is 72. Her respiration is 20 and oxygen saturation is 95%.     Body mass index is 30.79 kg/m².  Temperature Range: Temp: 97.7 °F (36.5 °C) Temp  Av °F (36.7 °C)  Min: 97.7 °F (36.5 °C)  Max: 98.5 °F (36.9 °C)  BP Range:  Systolic (24hrs), Av , Min:108 , Max:151     Diastolic (24hrs), Av, Min:66, Max:97    Pulse Range: Pulse  Av.7  Min: 59  Max: 72  Respiration Range: Resp  Av.5  Min: 16  Max: 22  Current Pulse Ox::  SpO2: 95 %  24HR Pulse Ox Range:  SpO2  Av %  Min: 92 %  Max: 99 %  Oxygen Amount and Delivery: O2 Flow Rate (L/min): 2 L/min    Wt Readings from Last 3 Encounters:   24 83.9 kg (185 lb)   06/10/24 87.7 kg (193 lb 6.4 oz)   23 88.9 kg (196 lb)       I/O (24 Hours)    Intake/Output Summary (Last 24 hours) at 2024 1425  Last data filed at 2024 0630  Gross per 24 hour   Intake 240 ml   Output 55 ml   Net 185 ml       EXAM     General Appearance  Awake, alert, oriented, in no acute distress  HEENT - normocephalic, atraumatic. []  Mallampati  [] Crowded airway   [] Macroglossia  []  Retrognathia  [] Micrognathia  []  Normal tongue size []  Normal Bite  []  x-ray today and tomorrow  Continue ceftriaxone  Oxygen keep saturations greater than 88%  She may need to go to [facility] depending on plan with the chest tubes; patient is agreeable  Electronically signed by Lopez Escalante MD on 11/13/2024 at 2:25 PM

## 2024-11-13 NOTE — PLAN OF CARE
Problem: Chronic Conditions and Co-morbidities  Goal: Patient's chronic conditions and co-morbidity symptoms are monitored and maintained or improved  Outcome: Progressing  Flowsheets (Taken 11/12/2024 2000)  Care Plan - Patient's Chronic Conditions and Co-Morbidity Symptoms are Monitored and Maintained or Improved:   Monitor and assess patient's chronic conditions and comorbid symptoms for stability, deterioration, or improvement   Collaborate with multidisciplinary team to address chronic and comorbid conditions and prevent exacerbation or deterioration   Update acute care plan with appropriate goals if chronic or comorbid symptoms are exacerbated and prevent overall improvement and discharge     Problem: Discharge Planning  Goal: Discharge to home or other facility with appropriate resources  Outcome: Progressing  Flowsheets (Taken 11/12/2024 2000)  Discharge to home or other facility with appropriate resources:   Identify barriers to discharge with patient and caregiver   Arrange for needed discharge resources and transportation as appropriate   Identify discharge learning needs (meds, wound care, etc)   Refer to discharge planning if patient needs post-hospital services based on physician order or complex needs related to functional status, cognitive ability or social support system     Problem: Pain  Goal: Verbalizes/displays adequate comfort level or baseline comfort level  Outcome: Progressing     Problem: Respiratory - Adult  Goal: Achieves optimal ventilation and oxygenation  Outcome: Progressing     Problem: Safety - Adult  Goal: Free from fall injury  Outcome: Progressing     Problem: Neurosensory - Adult  Goal: Achieves stable or improved neurological status  Outcome: Progressing     Problem: Cardiovascular - Adult  Goal: Maintains optimal cardiac output and hemodynamic stability  Outcome: Progressing     Problem: Skin/Tissue Integrity - Adult  Goal: Skin integrity remains intact  Outcome:

## 2024-11-14 ENCOUNTER — TELEPHONE (OUTPATIENT)
Age: 79
End: 2024-11-14

## 2024-11-14 ENCOUNTER — APPOINTMENT (OUTPATIENT)
Dept: GENERAL RADIOLOGY | Age: 79
DRG: 193 | End: 2024-11-14
Payer: MEDICARE

## 2024-11-14 LAB
GLUCOSE BLD-MCNC: 102 MG/DL (ref 65–105)
GLUCOSE BLD-MCNC: 116 MG/DL (ref 65–105)
GLUCOSE BLD-MCNC: 192 MG/DL (ref 65–105)
GLUCOSE BLD-MCNC: 78 MG/DL (ref 65–105)
SURGICAL PATHOLOGY REPORT: NORMAL

## 2024-11-14 PROCEDURE — 97110 THERAPEUTIC EXERCISES: CPT

## 2024-11-14 PROCEDURE — 97116 GAIT TRAINING THERAPY: CPT

## 2024-11-14 PROCEDURE — 2700000000 HC OXYGEN THERAPY PER DAY

## 2024-11-14 PROCEDURE — 6360000002 HC RX W HCPCS: Performed by: FAMILY MEDICINE

## 2024-11-14 PROCEDURE — 82947 ASSAY GLUCOSE BLOOD QUANT: CPT

## 2024-11-14 PROCEDURE — 6370000000 HC RX 637 (ALT 250 FOR IP): Performed by: FAMILY MEDICINE

## 2024-11-14 PROCEDURE — 6360000002 HC RX W HCPCS: Performed by: INTERNAL MEDICINE

## 2024-11-14 PROCEDURE — 71045 X-RAY EXAM CHEST 1 VIEW: CPT

## 2024-11-14 PROCEDURE — 2060000000 HC ICU INTERMEDIATE R&B

## 2024-11-14 PROCEDURE — 6370000000 HC RX 637 (ALT 250 FOR IP): Performed by: NURSE PRACTITIONER

## 2024-11-14 PROCEDURE — 94761 N-INVAS EAR/PLS OXIMETRY MLT: CPT

## 2024-11-14 PROCEDURE — 99232 SBSQ HOSP IP/OBS MODERATE 35: CPT | Performed by: FAMILY MEDICINE

## 2024-11-14 PROCEDURE — 2580000003 HC RX 258: Performed by: FAMILY MEDICINE

## 2024-11-14 RX ADMIN — CARVEDILOL 6.25 MG: 3.12 TABLET, FILM COATED ORAL at 17:28

## 2024-11-14 RX ADMIN — METRONIDAZOLE 500 MG: 500 INJECTION, SOLUTION INTRAVENOUS at 14:52

## 2024-11-14 RX ADMIN — CITALOPRAM HYDROBROMIDE 20 MG: 20 TABLET ORAL at 09:34

## 2024-11-14 RX ADMIN — CARVEDILOL 6.25 MG: 3.12 TABLET, FILM COATED ORAL at 09:34

## 2024-11-14 RX ADMIN — SODIUM CHLORIDE, PRESERVATIVE FREE 10 ML: 5 INJECTION INTRAVENOUS at 12:23

## 2024-11-14 RX ADMIN — ACETAMINOPHEN 650 MG: 325 TABLET ORAL at 20:50

## 2024-11-14 RX ADMIN — ENOXAPARIN SODIUM 40 MG: 100 INJECTION SUBCUTANEOUS at 09:35

## 2024-11-14 RX ADMIN — PANTOPRAZOLE SODIUM 40 MG: 40 TABLET, DELAYED RELEASE ORAL at 05:05

## 2024-11-14 RX ADMIN — AMLODIPINE BESYLATE 5 MG: 5 TABLET ORAL at 09:35

## 2024-11-14 RX ADMIN — METRONIDAZOLE 500 MG: 500 INJECTION, SOLUTION INTRAVENOUS at 20:53

## 2024-11-14 RX ADMIN — ACETAMINOPHEN 650 MG: 325 TABLET ORAL at 09:35

## 2024-11-14 RX ADMIN — DEXTROMETHORPHAN POLISTIREX 60 MG: 30 SUSPENSION ORAL at 20:50

## 2024-11-14 RX ADMIN — METRONIDAZOLE 500 MG: 500 INJECTION, SOLUTION INTRAVENOUS at 05:04

## 2024-11-14 RX ADMIN — VALSARTAN 80 MG: 160 TABLET, FILM COATED ORAL at 09:35

## 2024-11-14 RX ADMIN — MELOXICAM 15 MG: 7.5 TABLET ORAL at 09:34

## 2024-11-14 RX ADMIN — Medication 1000 MG: at 12:17

## 2024-11-14 RX ADMIN — DEXTROMETHORPHAN POLISTIREX 60 MG: 30 SUSPENSION ORAL at 09:41

## 2024-11-14 RX ADMIN — TRAZODONE HYDROCHLORIDE 100 MG: 100 TABLET ORAL at 20:50

## 2024-11-14 RX ADMIN — LEVOTHYROXINE SODIUM 100 MCG: 50 TABLET ORAL at 05:05

## 2024-11-14 RX ADMIN — SODIUM CHLORIDE, PRESERVATIVE FREE 10 ML: 5 INJECTION INTRAVENOUS at 20:50

## 2024-11-14 ASSESSMENT — PAIN DESCRIPTION - ONSET
ONSET: ON-GOING
ONSET: ON-GOING

## 2024-11-14 ASSESSMENT — PAIN DESCRIPTION - DESCRIPTORS
DESCRIPTORS: ACHING;SORE
DESCRIPTORS: DISCOMFORT
DESCRIPTORS: ACHING

## 2024-11-14 ASSESSMENT — PAIN DESCRIPTION - PAIN TYPE
TYPE: ACUTE PAIN
TYPE: ACUTE PAIN

## 2024-11-14 ASSESSMENT — PAIN - FUNCTIONAL ASSESSMENT
PAIN_FUNCTIONAL_ASSESSMENT: PREVENTS OR INTERFERES SOME ACTIVE ACTIVITIES AND ADLS
PAIN_FUNCTIONAL_ASSESSMENT: PREVENTS OR INTERFERES SOME ACTIVE ACTIVITIES AND ADLS

## 2024-11-14 ASSESSMENT — PAIN DESCRIPTION - ORIENTATION
ORIENTATION: RIGHT
ORIENTATION: POSTERIOR
ORIENTATION: RIGHT

## 2024-11-14 ASSESSMENT — PAIN DESCRIPTION - FREQUENCY
FREQUENCY: CONTINUOUS
FREQUENCY: CONTINUOUS

## 2024-11-14 ASSESSMENT — PAIN SCALES - GENERAL
PAINLEVEL_OUTOF10: 2
PAINLEVEL_OUTOF10: 0
PAINLEVEL_OUTOF10: 2
PAINLEVEL_OUTOF10: 1
PAINLEVEL_OUTOF10: 2
PAINLEVEL_OUTOF10: 2
PAINLEVEL_OUTOF10: 0
PAINLEVEL_OUTOF10: 2

## 2024-11-14 ASSESSMENT — PAIN DESCRIPTION - LOCATION
LOCATION: BACK
LOCATION: BACK
LOCATION: RIB CAGE

## 2024-11-14 NOTE — PROGRESS NOTES
Physical Therapy  Facility/Department: [address]  Physical Therapy Daily Treatment Note    Name: Clary Harris  : 1945  MRN: [id number]  Date of Service: 2024    Discharge Recommendations:  No therapy recommended at discharge   PT Equipment Recommendations  Equipment Needed: No      Patient Diagnosis(es): The primary encounter diagnosis was Chest pain, unspecified type. Diagnoses of Dyspnea, unspecified type, Hypoxia, Pneumonia of both lower lobes due to infectious organism, and Chronic obstructive pulmonary disease, unspecified COPD type (HCC) were also pertinent to this visit.  Past Medical History:  has a past medical history of COPD (chronic obstructive pulmonary disease) (HCC), Diverticulosis, DJD (degenerative joint disease), Hypertension, Hypertriglyceridemia, Hypothyroidism, Mild valvular heart disease, Osteoarthritis, Sleep apnea, Type 2 diabetes mellitus without complication (HCC), and Vertigo.  Past Surgical History:  has a past surgical history that includes Tubal ligation; Cholecystectomy; Colonoscopy; Breast biopsy; IR CHEST TUBE INSERTION (2024); and CT GUIDED CHEST TUBE (2024).    Assessment  Body Structures, Functions, Activity Limitations Requiring Skilled Therapeutic Intervention: Decreased functional mobility ;Decreased safe awareness;Decreased endurance    Assessment: Pt presents with community acquired pneumonia and currently has a chest tube. At baseline, pt lives alone and was independent gait no device, ind ADL's and all IADL's including yard work. Pt currently demonstrating independence with transfers, pt ambulated 100ft no device with SBA. Pt on 3L O2 for ambulation and 2L at rest. Pt able to hold minimal conversation during ambulation without increase SOB. Pt performed standing exs with UE support. Pt reports improved endurance this date. Pt demonstrates adequate safety for return to prior living situation. Pt will benefit from continued skilled PT while in  sway. No losses of balance. Pt able to hold minimal conversation during gait without increase SOB. Pt refused 2nd gait bout due to fatigue, however, agreeable to standing exs  Distance: 100ft          Exercise Treatment: Standing LE exs with rona UE support 2 sets of 10 reps min assist for marching and hip abd. Noted increase SOB and RR after exercises       OutComes Score                                                  AM-PAC - Mobility    AM-PAC Basic Mobility - Inpatient   How much help is needed turning from your back to your side while in a flat bed without using bedrails?: None  How much help is needed moving from lying on your back to sitting on the side of a flat bed without using bedrails?: None  How much help is needed moving to and from a bed to a chair?: A Little  How much help is needed standing up from a chair using your arms?: None  How much help is needed walking in hospital room?: A Little  How much help is needed climbing 3-5 steps with a railing?: A Little  AM-[facility] Inpatient Mobility Raw Score : 21  AM-PAC Inpatient T-Scale Score : 50.25  Mobility Inpatient CMS 0-100% Score: 28.97  Mobility Inpatient CMS G-Code Modifier : CJ                Goals  Short Term Goals  Time Frame for Short Term Goals: 14 visits  Short Term Goal 1: supine to and from sit independent  Short Term Goal 2: sit to and from stand independent  Short Term Goal 3: ambulate 200ft no device independent  Short Term Goal 4: one step independently  Patient Goals   Patient Goals : to go home       Education  Patient Education  Education Given To: Patient  Education Provided: Role of Therapy;Plan of Care;Energy Conservation;Home Exercise Program  Education Method: Demonstration;Verbal  Barriers to Learning: None  Education Outcome: Verbalized understanding;Demonstrated understanding;Continued education needed      Therapy Time   Individual Concurrent Group Co-treatment   Time In 1546         Time Out 1612         Minutes 26

## 2024-11-14 NOTE — CARE COORDINATION
Rec'd email from daughter Bee, she requests referral to home care [facility]- requests  1. [facility], 2 [facility] and 3. [facility]  referrals sent.       1133 Rec'd message from Jackie @ [facility] they can accept.     1330 CM spoke with Windy @ [facility] they can accept patient.

## 2024-11-14 NOTE — PROGRESS NOTES
Pulmonary Progress Note  [facility]      Patient - Clary Harris,  Age - 79 y.o.    - 1945      Room Number - 343/343-01   N -  [id number]   [facility] # - [id number]  Date of Admission -  2024  9:06 PM        Consulting Service/Physician   Consulting - Luis Galvan MD  Primary Care Physician - Kandi Thompson DO     SUBJECTIVE     Looks comfortable sitting in a chair, no complaints.  No air leak from either chest tube site; no significant drainage      OBJECTIVE   VITALS    height is 1.651 m (5' 5\") and weight is 83.9 kg (185 lb). Her oral temperature is 98.3 °F (36.8 °C). Her blood pressure is 106/73 and her pulse is 65. Her respiration is 27 and oxygen saturation is 98%.     Body mass index is 30.79 kg/m².  Temperature Range: Temp: 98.3 °F (36.8 °C) Temp  Av.3 °F (36.8 °C)  Min: 98.1 °F (36.7 °C)  Max: 98.4 °F (36.9 °C)  BP Range:  Systolic (24hrs), Av , Min:105 , Max:135     Diastolic (24hrs), Av, Min:73, Max:88    Pulse Range: Pulse  Av.7  Min: 58  Max: 65  Respiration Range: Resp  Av.8  Min: 16  Max: 27  Current Pulse Ox::  SpO2: 98 %  24HR Pulse Ox Range:  SpO2  Av.2 %  Min: 94 %  Max: 98 %  Oxygen Amount and Delivery: O2 Flow Rate (L/min): 2 L/min    Wt Readings from Last 3 Encounters:   24 83.9 kg (185 lb)   06/10/24 87.7 kg (193 lb 6.4 oz)   23 88.9 kg (196 lb)       I/O (24 Hours)    Intake/Output Summary (Last 24 hours) at 2024 4929  Last data filed at 2024 0953  Gross per 24 hour   Intake --   Output 150 ml   Net -150 ml       EXAM     General Appearance  Awake, alert, oriented, in no acute distress  HEENT - normocephalic, atraumatic. []  Mallampati  [x] Crowded airway   [] Macroglossia  []  Retrognathia  [] Micrognathia  []  Normal tongue size []  Normal Bite  [] [place] sign positive    Neck - Supple,  trachea midline   Lungs -diminished right side no wheezes or

## 2024-11-14 NOTE — PLAN OF CARE
Problem: Chronic Conditions and Co-morbidities  Goal: Patient's chronic conditions and co-morbidity symptoms are monitored and maintained or improved  Outcome: Progressing  Flowsheets (Taken 11/14/2024 0730)  Care Plan - Patient's Chronic Conditions and Co-Morbidity Symptoms are Monitored and Maintained or Improved: Monitor and assess patient's chronic conditions and comorbid symptoms for stability, deterioration, or improvement     Problem: Pain  Goal: Verbalizes/displays adequate comfort level or baseline comfort level  Outcome: Progressing     Problem: Discharge Planning  Goal: Discharge to home or other facility with appropriate resources  Outcome: Progressing  Flowsheets (Taken 11/14/2024 0730)  Discharge to home or other facility with appropriate resources:   Identify barriers to discharge with patient and caregiver   Arrange for needed discharge resources and transportation as appropriate   Identify discharge learning needs (meds, wound care, etc)     Problem: Neurosensory - Adult  Goal: Achieves stable or improved neurological status  Outcome: Progressing  Flowsheets (Taken 11/14/2024 0730)  Achieves stable or improved neurological status: Assess for and report changes in neurological status     Problem: Cardiovascular - Adult  Goal: Maintains optimal cardiac output and hemodynamic stability  Outcome: Progressing  Flowsheets (Taken 11/14/2024 0730)  Maintains optimal cardiac output and hemodynamic stability: Monitor blood pressure and heart rate

## 2024-11-14 NOTE — PLAN OF CARE
Problem: Chronic Conditions and Co-morbidities  Goal: Patient's chronic conditions and co-morbidity symptoms are monitored and maintained or improved  11/14/2024 0245 by Nicki Ford RN  Outcome: Progressing     Problem: Discharge Planning  Goal: Discharge to home or other facility with appropriate resources  11/14/2024 0245 by Nicki Ford RN  Outcome: Progressing     Problem: Pain  Goal: Verbalizes/displays adequate comfort level or baseline comfort level  11/14/2024 0245 by Nicki Ford RN  Outcome: Progressing   NO NEW SIGNS OR SYMPTOMS OF PAIN NOTED, PAIN RATING <3 ON SCALE OF 0-10 THIS SHIFT. PAIN CONTROLLED WITH MEDICATION AND REPOSITIONING.  Problem: Respiratory - Adult  Goal: Achieves optimal ventilation and oxygenation  11/14/2024 0245 by Nicki Ford RN  Outcome: Progressing     Problem: Safety - Adult  Goal: Free from fall injury  11/14/2024 0245 by Nicki Ford RN  Outcome: Progressing   NO FALLS OR INJURIES THIS SHIFT, BED IN LOWEST POSITION, BRAKES ON, BED ALARM ON, CALL LIGHT IN REACH, SIDE RAILS UP X2.  Problem: Neurosensory - Adult  Goal: Achieves stable or improved neurological status  11/14/2024 0245 by Nicki Ford RN  Outcome: Progressing     Problem: Cardiovascular - Adult  Goal: Maintains optimal cardiac output and hemodynamic stability  11/14/2024 0245 by Nicki Ford RN  Outcome: Progressing     Problem: Skin/Tissue Integrity - Adult  Goal: Skin integrity remains intact  11/14/2024 0245 by Nicki Ford RN  Outcome: Progressing     Problem: Musculoskeletal - Adult  Goal: Return mobility to safest level of function  11/14/2024 0245 by Nicki Ford RN  Outcome: Progressing     Problem: Musculoskeletal - Adult  Goal: Maintain proper alignment of affected body part  11/14/2024 0245 by Nicki Ford RN  Outcome: Progressing     Problem: Gastrointestinal - Adult  Goal: Minimal or absence of nausea

## 2024-11-14 NOTE — PROGRESS NOTES
Per Dr. Escalante, chest tubes are to water seal currently, CXR to be performed at 7pm.     Spoke with ANA MARIA Alvarez,  and she states that [facility] is able to take pt if her chest tubes are to suction. If pt is able to go home with chest tubes, [address] is able to accept.

## 2024-11-14 NOTE — TELEPHONE ENCOUNTER
Pt will be discharged from [facility] soon will you follow for home care? Notify Muna  option #4 to confirm

## 2024-11-15 ENCOUNTER — APPOINTMENT (OUTPATIENT)
Dept: GENERAL RADIOLOGY | Age: 79
DRG: 193 | End: 2024-11-15
Payer: MEDICARE

## 2024-11-15 LAB
ANION GAP SERPL CALCULATED.3IONS-SCNC: 6 MMOL/L (ref 9–17)
BASOPHILS # BLD: 0.1 K/UL (ref 0–0.2)
BASOPHILS NFR BLD: 1 % (ref 0–2)
BUN SERPL-MCNC: 11 MG/DL (ref 8–23)
CALCIUM SERPL-MCNC: 8.8 MG/DL (ref 8.6–10.4)
CHLORIDE SERPL-SCNC: 105 MMOL/L (ref 98–107)
CO2 SERPL-SCNC: 31 MMOL/L (ref 20–31)
CREAT SERPL-MCNC: 0.6 MG/DL (ref 0.5–0.9)
EOSINOPHIL # BLD: 0.2 K/UL (ref 0–0.4)
EOSINOPHILS RELATIVE PERCENT: 2 % (ref 1–4)
ERYTHROCYTE [DISTWIDTH] IN BLOOD BY AUTOMATED COUNT: 15.3 % (ref 12.5–15.4)
GFR, ESTIMATED: >90 ML/MIN/1.73M2
GLUCOSE BLD-MCNC: 110 MG/DL (ref 65–105)
GLUCOSE BLD-MCNC: 127 MG/DL (ref 65–105)
GLUCOSE BLD-MCNC: 129 MG/DL (ref 65–105)
GLUCOSE BLD-MCNC: 174 MG/DL (ref 65–105)
GLUCOSE SERPL-MCNC: 102 MG/DL (ref 70–99)
HCT VFR BLD AUTO: 33.7 % (ref 36–46)
HGB BLD-MCNC: 11 G/DL (ref 12–16)
LYMPHOCYTES NFR BLD: 1.6 K/UL (ref 1–4.8)
LYMPHOCYTES RELATIVE PERCENT: 17 % (ref 24–44)
MCH RBC QN AUTO: 28.4 PG (ref 26–34)
MCHC RBC AUTO-ENTMCNC: 32.5 G/DL (ref 31–37)
MCV RBC AUTO: 87.4 FL (ref 80–100)
MONOCYTES NFR BLD: 0.8 K/UL (ref 0.1–1.2)
MONOCYTES NFR BLD: 9 % (ref 2–11)
NEUTROPHILS NFR BLD: 71 % (ref 36–66)
NEUTS SEG NFR BLD: 7 K/UL (ref 1.8–7.7)
PLATELET # BLD AUTO: 400 K/UL (ref 140–450)
PMV BLD AUTO: 7.2 FL (ref 6–12)
POTASSIUM SERPL-SCNC: 4.1 MMOL/L (ref 3.7–5.3)
RBC # BLD AUTO: 3.86 M/UL (ref 4–5.2)
SODIUM SERPL-SCNC: 142 MMOL/L (ref 135–144)
WBC OTHER # BLD: 9.7 K/UL (ref 3.5–11)

## 2024-11-15 PROCEDURE — 85025 COMPLETE CBC W/AUTO DIFF WBC: CPT

## 2024-11-15 PROCEDURE — 97535 SELF CARE MNGMENT TRAINING: CPT

## 2024-11-15 PROCEDURE — 71045 X-RAY EXAM CHEST 1 VIEW: CPT

## 2024-11-15 PROCEDURE — 36415 COLL VENOUS BLD VENIPUNCTURE: CPT

## 2024-11-15 PROCEDURE — 99232 SBSQ HOSP IP/OBS MODERATE 35: CPT | Performed by: FAMILY MEDICINE

## 2024-11-15 PROCEDURE — 2580000003 HC RX 258: Performed by: FAMILY MEDICINE

## 2024-11-15 PROCEDURE — 82947 ASSAY GLUCOSE BLOOD QUANT: CPT

## 2024-11-15 PROCEDURE — 80048 BASIC METABOLIC PNL TOTAL CA: CPT

## 2024-11-15 PROCEDURE — 6360000002 HC RX W HCPCS: Performed by: INTERNAL MEDICINE

## 2024-11-15 PROCEDURE — 97116 GAIT TRAINING THERAPY: CPT

## 2024-11-15 PROCEDURE — 6370000000 HC RX 637 (ALT 250 FOR IP): Performed by: FAMILY MEDICINE

## 2024-11-15 PROCEDURE — 6360000002 HC RX W HCPCS: Performed by: FAMILY MEDICINE

## 2024-11-15 PROCEDURE — 94761 N-INVAS EAR/PLS OXIMETRY MLT: CPT

## 2024-11-15 PROCEDURE — 2060000000 HC ICU INTERMEDIATE R&B

## 2024-11-15 PROCEDURE — 6370000000 HC RX 637 (ALT 250 FOR IP): Performed by: NURSE PRACTITIONER

## 2024-11-15 PROCEDURE — 2700000000 HC OXYGEN THERAPY PER DAY

## 2024-11-15 RX ORDER — FUROSEMIDE 10 MG/ML
20 INJECTION INTRAMUSCULAR; INTRAVENOUS ONCE
Status: COMPLETED | OUTPATIENT
Start: 2024-11-15 | End: 2024-11-15

## 2024-11-15 RX ADMIN — VALSARTAN 80 MG: 160 TABLET, FILM COATED ORAL at 08:34

## 2024-11-15 RX ADMIN — DEXTROMETHORPHAN POLISTIREX 60 MG: 30 SUSPENSION ORAL at 08:38

## 2024-11-15 RX ADMIN — MELOXICAM 15 MG: 7.5 TABLET ORAL at 08:35

## 2024-11-15 RX ADMIN — SODIUM CHLORIDE, PRESERVATIVE FREE 10 ML: 5 INJECTION INTRAVENOUS at 08:34

## 2024-11-15 RX ADMIN — CITALOPRAM HYDROBROMIDE 20 MG: 20 TABLET ORAL at 08:35

## 2024-11-15 RX ADMIN — LEVOTHYROXINE SODIUM 100 MCG: 50 TABLET ORAL at 05:04

## 2024-11-15 RX ADMIN — CARVEDILOL 6.25 MG: 3.12 TABLET, FILM COATED ORAL at 08:34

## 2024-11-15 RX ADMIN — METRONIDAZOLE 500 MG: 500 INJECTION, SOLUTION INTRAVENOUS at 05:06

## 2024-11-15 RX ADMIN — DEXTROMETHORPHAN POLISTIREX 60 MG: 30 SUSPENSION ORAL at 20:54

## 2024-11-15 RX ADMIN — AMLODIPINE BESYLATE 5 MG: 5 TABLET ORAL at 08:34

## 2024-11-15 RX ADMIN — CARVEDILOL 6.25 MG: 3.12 TABLET, FILM COATED ORAL at 17:22

## 2024-11-15 RX ADMIN — Medication 1000 MG: at 13:28

## 2024-11-15 RX ADMIN — SODIUM CHLORIDE, PRESERVATIVE FREE 10 ML: 5 INJECTION INTRAVENOUS at 20:58

## 2024-11-15 RX ADMIN — PANTOPRAZOLE SODIUM 40 MG: 40 TABLET, DELAYED RELEASE ORAL at 05:04

## 2024-11-15 RX ADMIN — ACETAMINOPHEN 650 MG: 325 TABLET ORAL at 20:53

## 2024-11-15 RX ADMIN — ENOXAPARIN SODIUM 40 MG: 100 INJECTION SUBCUTANEOUS at 08:33

## 2024-11-15 RX ADMIN — FUROSEMIDE 20 MG: 10 INJECTION, SOLUTION INTRAMUSCULAR; INTRAVENOUS at 18:47

## 2024-11-15 RX ADMIN — METRONIDAZOLE 500 MG: 500 INJECTION, SOLUTION INTRAVENOUS at 13:33

## 2024-11-15 RX ADMIN — METRONIDAZOLE 500 MG: 500 INJECTION, SOLUTION INTRAVENOUS at 20:58

## 2024-11-15 RX ADMIN — TRAZODONE HYDROCHLORIDE 100 MG: 100 TABLET ORAL at 20:53

## 2024-11-15 ASSESSMENT — PAIN SCALES - GENERAL
PAINLEVEL_OUTOF10: 0
PAINLEVEL_OUTOF10: 0
PAINLEVEL_OUTOF10: 2

## 2024-11-15 ASSESSMENT — PAIN DESCRIPTION - ORIENTATION: ORIENTATION: RIGHT

## 2024-11-15 ASSESSMENT — PAIN DESCRIPTION - LOCATION: LOCATION: BACK

## 2024-11-15 NOTE — PROGRESS NOTES
Occupational Therapy  Occupational Therapy Daily Treatment Note  Facility/Department: [address]   Patient Name: Clary Harris        MRN: [id number]    : 1945    Date of Service: 11/15/2024    Discharge Recommendations  Discharge Recommendations: Patient would benefit from continued therapy after discharge       Chief Complaint   Patient presents with    Shortness of Breath    Chest Pain     Onset Wednesday    Chills     Past Medical History:  has a past medical history of COPD (chronic obstructive pulmonary disease) (HCC), Diverticulosis, DJD (degenerative joint disease), Hypertension, Hypertriglyceridemia, Hypothyroidism, Mild valvular heart disease, Osteoarthritis, Sleep apnea, Type 2 diabetes mellitus without complication (HCC), and Vertigo.  Past Surgical History:  has a past surgical history that includes Tubal ligation; Cholecystectomy; Colonoscopy; Breast biopsy; IR CHEST TUBE INSERTION (2024); and CT GUIDED CHEST TUBE (2024).    Assessment  Performance deficits / Impairments: Decreased functional mobility ;Decreased endurance;Decreased balance;Decreased ADL status  Prognosis: Good  REQUIRES OT FOLLOW-UP: Yes  Activity Tolerance  Activity Tolerance: Patient Tolerated treatment well  Safety Devices  Type of Devices: Gait belt;Nurse notified;Call light within reach;Left in chair  Restraints  Restraints Initially in Place: No    Restrictions/Precautions  Restrictions/Precautions  Restrictions/Precautions: Fall Risk  Required Braces or Orthoses?: No  Position Activity Restriction  Other position/activity restrictions: Chest tubes x2 to water seal. 2L O2 via nasal canula. Up with assistance.    Subjective  General  Patient assessed for rehabilitation services?: Yes  Response to previous treatment: Patient with no complaints from previous session  Family / Caregiver Present: No  General Comment  Comments: RN ok'd for therapy this PM. Pt agreeable to participate in session and

## 2024-11-15 NOTE — PROGRESS NOTES
[facility]  IN-PATIENT SERVICE   [facility]    Progress Note    11/15/2024    8:25 AM    Name:   Clary Harris  MRN:     [id number]     Acct:      [id number]   Room:   [address] [place] [address] Day:  13  Admit Date:  11/2/2024  9:06 PM    PCP:   Kandi Thompson DO  Code Status:  Full Code    Subjective:     Patient is doing well except that she does have a little more right sided chest pain today.  She is eating and drinking well.  She does feel that she is able to take bigger, deeper breaths with her incentive spirometer.    Medications:     Allergies:    Allergies   Allergen Reactions    Hydrochlorothiazide     Sulfa Antibiotics Hives, Other (See Comments) and Itching       Current Meds:   Scheduled Meds:    cefTRIAXone (ROCEPHIN) IV  1,000 mg IntraVENous Q24H    metroNIDAZOLE  500 mg IntraVENous Q8H    insulin lispro  3-15 Units SubCUTAneous TID WC    Budeson-Glycopyrrol-Formoterol  2 puff Inhalation BID    levothyroxine  100 mcg Oral Daily    meloxicam  15 mg Oral Daily    traZODone  100 mg Oral Nightly    dextromethorphan  60 mg Oral 2 times per day    sodium chloride  80 mL IntraVENous Once    sodium chloride flush  5-40 mL IntraVENous 2 times per day    enoxaparin  40 mg SubCUTAneous Daily    amLODIPine  5 mg Oral Daily    valsartan  80 mg Oral Daily    carvedilol  6.25 mg Oral BID WC    citalopram  20 mg Oral Daily    pantoprazole  40 mg Oral QAM AC     Continuous Infusions:    dextrose      sodium chloride Stopped (11/07/24 0433)     PRN Meds: glucose, dextrose bolus **OR** dextrose bolus, glucagon (rDNA), dextrose, albuterol, benzonatate, sodium chloride flush, sodium chloride, potassium chloride **OR** potassium alternative oral replacement **OR** potassium chloride, magnesium sulfate, ondansetron **OR** ondansetron, polyethylene glycol, acetaminophen **OR** acetaminophen    Data:     Vitals:  /79   Pulse 69   Temp 98.6 °F (37 °C) (Oral)   Resp 25   Ht

## 2024-11-15 NOTE — PROGRESS NOTES
Physical Therapy  Facility/Department: [address]  Physical Therapy Daily Progress Note    Name: Clary Harris  : 1945  MRN: [id number]  Date of Service: 11/15/2024    Discharge Recommendations:  No therapy recommended at discharge   PT Equipment Recommendations  Equipment Needed: No      Patient Diagnosis(es): The primary encounter diagnosis was Chest pain, unspecified type. Diagnoses of Dyspnea, unspecified type, Hypoxia, Pneumonia of both lower lobes due to infectious organism, and Chronic obstructive pulmonary disease, unspecified COPD type (HCC) were also pertinent to this visit.  Past Medical History:  has a past medical history of COPD (chronic obstructive pulmonary disease) (HCC), Diverticulosis, DJD (degenerative joint disease), Hypertension, Hypertriglyceridemia, Hypothyroidism, Mild valvular heart disease, Osteoarthritis, Sleep apnea, Type 2 diabetes mellitus without complication (HCC), and Vertigo.  Past Surgical History:  has a past surgical history that includes Tubal ligation; Cholecystectomy; Colonoscopy; Breast biopsy; IR CHEST TUBE INSERTION (2024); and CT GUIDED CHEST TUBE (2024).    Assessment  Body Structures, Functions, Activity Limitations Requiring Skilled Therapeutic Intervention: Decreased functional mobility ;Decreased safe awareness;Decreased endurance  Assessment: Pt presents with community acquired pneumonia and currently has a chest tube. At baseline, pt lives alone and was independent gait no device, ind ADL's and all IADL's including yard work. Pt currently demonstrating independence with transfers, pt ambulated 100ft no device with SBA. Pt on 3L O2 for ambulation and 2L at rest. Pt demonstrates adequate safety for return to prior living situation. Pt will benefit from continued skilled PT while in the hospital for endurance, safety and functional mobility training. Pt does not require continued PT at discharge.  Therapy Prognosis: Good  Requires PT Follow-Up:

## 2024-11-15 NOTE — PROGRESS NOTES
Pulmonary Progress Note  [facility]      Patient - Clary Harris,  Age - 79 y.o.    - 1945      Room Number - 343/343-01   [facility] -  [id number]   formerly [facility] # - [id number]  Date of Admission -  2024  9:06 PM        Consulting Service/Physician   Consulting - Luis Galvan MD  Primary Care Physician - Kandi Thompson DO     SUBJECTIVE   Patient is in very good spirits.  Resting quietly.  Patient's nephew and nephew spouse present at bedside    OBJECTIVE   VITALS    height is 1.651 m (5' 5\") and weight is 83.9 kg (185 lb). Her oral temperature is 98.4 °F (36.9 °C). Her blood pressure is 124/81 and her pulse is 66. Her respiration is 23 and oxygen saturation is 96%.     Body mass index is 30.79 kg/m².  Temperature Range: Temp: 98.4 °F (36.9 °C) Temp  Av.3 °F (36.8 °C)  Min: 98.1 °F (36.7 °C)  Max: 98.6 °F (37 °C)  BP Range:  Systolic (24hrs), Av , Min:100 , Max:132     Diastolic (24hrs), Av, Min:66, Max:88    Pulse Range: Pulse  Av.6  Min: 59  Max: 79  Respiration Range: Resp  Av.3  Min: 16  Max: 34  Current Pulse Ox::  SpO2: 96 %  24HR Pulse Ox Range:  SpO2  Av.5 %  Min: 93 %  Max: 98 %  Oxygen Amount and Delivery: O2 Flow Rate (L/min): 2 L/min    Wt Readings from Last 3 Encounters:   24 83.9 kg (185 lb)   06/10/24 87.7 kg (193 lb 6.4 oz)   23 88.9 kg (196 lb)       I/O (24 Hours)    Intake/Output Summary (Last 24 hours) at 11/15/2024 1822  Last data filed at 11/15/2024 1800  Gross per 24 hour   Intake 1040 ml   Output --   Net 1040 ml       EXAM     General Appearance  Awake, alert, oriented, in no acute distress  HEENT - normocephalic, atraumatic.  Neck - Supple,  trachea midline   Lungs -coarse breath sounds no crackles rales or wheezes  Heart Exam:PMI normal. No lifts, heaves, or thrills. RRR. No murmurs, clicks, gallops, or rubs  Abdomen Exam: Abdomen soft, non-tender  Extremity Exam: No signs of discoloration     [facility]

## 2024-11-15 NOTE — CARE COORDINATION
Patients chest tubes are to waterseal. She no longer meets criteria for LTACH. Spoke with Yesenia at [facility]. They are closing her case. Patient will go home with [address]. She is up walking around with standby assist. Spoke with Raysa at [address] and notified her of needed services. Patient remains on IV antibiotics. If she needs to discharge home with IV antibiotics, [address] will not be able to start care until Monday at least. CM will continue to follow.

## 2024-11-15 NOTE — PLAN OF CARE
Problem: Chronic Conditions and Co-morbidities  Goal: Patient's chronic conditions and co-morbidity symptoms are monitored and maintained or improved  11/14/2024 1929 by Nicki Ford RN  Outcome: Progressing  Flowsheets (Taken 11/14/2024 1900)  Care Plan - Patient's Chronic Conditions and Co-Morbidity Symptoms are Monitored and Maintained or Improved: Monitor and assess patient's chronic conditions and comorbid symptoms for stability, deterioration, or improvement     Problem: Discharge Planning  Goal: Discharge to home or other facility with appropriate resources  11/14/2024 1929 by Nicki Ford RN  Outcome: Progressing  Flowsheets (Taken 11/14/2024 1900)  Discharge to home or other facility with appropriate resources: Identify barriers to discharge with patient and caregiver     Problem: Pain  Goal: Verbalizes/displays adequate comfort level or baseline comfort level  11/14/2024 1929 by Nicki Ford RN  Outcome: Progressing   NO NEW SIGNS OR SYMPTOMS OF PAIN NOTED, PAIN RATING <3 ON SCALE OF 0-10 THIS SHIFT. PAIN CONTROLLED WITH MEDICATION AND REPOSITIONING.  Problem: Respiratory - Adult  Goal: Achieves optimal ventilation and oxygenation  11/14/2024 1929 by Nicki Ford RN  Outcome: Progressing  Flowsheets (Taken 11/14/2024 1900)  Achieves optimal ventilation and oxygenation: Assess for changes in respiratory status     Problem: Safety - Adult  Goal: Free from fall injury  11/14/2024 1929 by Nicki Ford RN  Outcome: Progressing   NO FALLS OR INJURIES THIS SHIFT, BED IN LOWEST POSITION, BRAKES ON, BED ALARM ON, CALL LIGHT IN REACH, SIDE RAILS UP X2.  Problem: Neurosensory - Adult  Goal: Achieves stable or improved neurological status  11/14/2024 1929 by Nicki Ford RN  Outcome: Progressing  Flowsheets (Taken 11/14/2024 1900)  Achieves stable or improved neurological status: Assess for and report changes in neurological status     Problem:  Cardiovascular - Adult  Goal: Maintains optimal cardiac output and hemodynamic stability  11/14/2024 1929 by Nicki Ford RN  Outcome: Progressing  Flowsheets (Taken 11/14/2024 1900)  Maintains optimal cardiac output and hemodynamic stability: Monitor blood pressure and heart rate     Problem: Skin/Tissue Integrity - Adult  Goal: Skin integrity remains intact  11/14/2024 1929 by Nicki Ford RN  Outcome: Progressing  Flowsheets (Taken 11/14/2024 1900)  Skin Integrity Remains Intact: Monitor for areas of redness and/or skin breakdown     Problem: Musculoskeletal - Adult  Goal: Return mobility to safest level of function  11/14/2024 1929 by Nicki Ford RN  Outcome: Progressing  Flowsheets (Taken 11/14/2024 1900)  Return Mobility to Safest Level of Function: Assess patient stability and activity tolerance for standing, transferring and ambulating with or without assistive devices     Problem: Musculoskeletal - Adult  Goal: Maintain proper alignment of affected body part  11/14/2024 1929 by Nicki Ford RN  Outcome: Progressing  Flowsheets (Taken 11/14/2024 1900)  Maintain proper alignment of affected body part: Support and protect limb and body alignment per provider's orders     Problem: Gastrointestinal - Adult  Goal: Minimal or absence of nausea and vomiting  11/14/2024 1929 by Nicki Ford RN  Outcome: Progressing  Flowsheets (Taken 11/14/2024 1900)  Minimal or absence of nausea and vomiting: Administer IV fluids as ordered to ensure adequate hydration     Problem: Infection - Adult  Goal: Absence of infection at discharge  11/14/2024 1929 by Nicki Ford RN  Outcome: Progressing  Flowsheets (Taken 11/14/2024 1900)  Absence of infection at discharge: Assess and monitor for signs and symptoms of infection     Problem: Metabolic/Fluid and Electrolytes - Adult  Goal: Electrolytes maintained within normal limits  11/14/2024 1929 by Nicki Ford

## 2024-11-16 ENCOUNTER — APPOINTMENT (OUTPATIENT)
Dept: CT IMAGING | Age: 79
DRG: 193 | End: 2024-11-16
Payer: MEDICARE

## 2024-11-16 LAB
ANION GAP SERPL CALCULATED.3IONS-SCNC: 5 MMOL/L (ref 9–17)
BASOPHILS # BLD: 0.1 K/UL (ref 0–0.2)
BASOPHILS NFR BLD: 1 % (ref 0–2)
BUN SERPL-MCNC: 15 MG/DL (ref 8–23)
CALCIUM SERPL-MCNC: 8.9 MG/DL (ref 8.6–10.4)
CHLORIDE SERPL-SCNC: 104 MMOL/L (ref 98–107)
CO2 SERPL-SCNC: 33 MMOL/L (ref 20–31)
CREAT SERPL-MCNC: 0.8 MG/DL (ref 0.5–0.9)
EOSINOPHIL # BLD: 0.1 K/UL (ref 0–0.4)
EOSINOPHILS RELATIVE PERCENT: 2 % (ref 1–4)
ERYTHROCYTE [DISTWIDTH] IN BLOOD BY AUTOMATED COUNT: 15.7 % (ref 12.5–15.4)
GFR, ESTIMATED: 75 ML/MIN/1.73M2
GLUCOSE BLD-MCNC: 109 MG/DL (ref 65–105)
GLUCOSE BLD-MCNC: 125 MG/DL (ref 65–105)
GLUCOSE BLD-MCNC: 159 MG/DL (ref 65–105)
GLUCOSE BLD-MCNC: 182 MG/DL (ref 65–105)
GLUCOSE SERPL-MCNC: 102 MG/DL (ref 70–99)
HCT VFR BLD AUTO: 35 % (ref 36–46)
HGB BLD-MCNC: 11.3 G/DL (ref 12–16)
LYMPHOCYTES NFR BLD: 1.7 K/UL (ref 1–4.8)
LYMPHOCYTES RELATIVE PERCENT: 20 % (ref 24–44)
MCH RBC QN AUTO: 28.5 PG (ref 26–34)
MCHC RBC AUTO-ENTMCNC: 32.4 G/DL (ref 31–37)
MCV RBC AUTO: 87.9 FL (ref 80–100)
MONOCYTES NFR BLD: 0.8 K/UL (ref 0.1–1.2)
MONOCYTES NFR BLD: 10 % (ref 2–11)
NEUTROPHILS NFR BLD: 67 % (ref 36–66)
NEUTS SEG NFR BLD: 5.8 K/UL (ref 1.8–7.7)
PLATELET # BLD AUTO: 457 K/UL (ref 140–450)
PMV BLD AUTO: 7.4 FL (ref 6–12)
POTASSIUM SERPL-SCNC: 3.8 MMOL/L (ref 3.7–5.3)
RBC # BLD AUTO: 3.98 M/UL (ref 4–5.2)
SODIUM SERPL-SCNC: 142 MMOL/L (ref 135–144)
WBC OTHER # BLD: 8.6 K/UL (ref 3.5–11)

## 2024-11-16 PROCEDURE — 2580000003 HC RX 258: Performed by: FAMILY MEDICINE

## 2024-11-16 PROCEDURE — 82947 ASSAY GLUCOSE BLOOD QUANT: CPT

## 2024-11-16 PROCEDURE — 6360000002 HC RX W HCPCS: Performed by: INTERNAL MEDICINE

## 2024-11-16 PROCEDURE — 36415 COLL VENOUS BLD VENIPUNCTURE: CPT

## 2024-11-16 PROCEDURE — 94761 N-INVAS EAR/PLS OXIMETRY MLT: CPT

## 2024-11-16 PROCEDURE — 2700000000 HC OXYGEN THERAPY PER DAY

## 2024-11-16 PROCEDURE — 2060000000 HC ICU INTERMEDIATE R&B

## 2024-11-16 PROCEDURE — 99221 1ST HOSP IP/OBS SF/LOW 40: CPT | Performed by: FAMILY MEDICINE

## 2024-11-16 PROCEDURE — 85025 COMPLETE CBC W/AUTO DIFF WBC: CPT

## 2024-11-16 PROCEDURE — 71250 CT THORAX DX C-: CPT

## 2024-11-16 PROCEDURE — 6370000000 HC RX 637 (ALT 250 FOR IP): Performed by: FAMILY MEDICINE

## 2024-11-16 PROCEDURE — 6370000000 HC RX 637 (ALT 250 FOR IP): Performed by: NURSE PRACTITIONER

## 2024-11-16 PROCEDURE — 80048 BASIC METABOLIC PNL TOTAL CA: CPT

## 2024-11-16 PROCEDURE — 6360000002 HC RX W HCPCS: Performed by: FAMILY MEDICINE

## 2024-11-16 RX ADMIN — Medication 1000 MG: at 12:19

## 2024-11-16 RX ADMIN — LEVOTHYROXINE SODIUM 100 MCG: 50 TABLET ORAL at 05:04

## 2024-11-16 RX ADMIN — PANTOPRAZOLE SODIUM 40 MG: 40 TABLET, DELAYED RELEASE ORAL at 05:04

## 2024-11-16 RX ADMIN — SODIUM CHLORIDE, PRESERVATIVE FREE 10 ML: 5 INJECTION INTRAVENOUS at 20:58

## 2024-11-16 RX ADMIN — VALSARTAN 80 MG: 160 TABLET, FILM COATED ORAL at 10:14

## 2024-11-16 RX ADMIN — TRAZODONE HYDROCHLORIDE 100 MG: 100 TABLET ORAL at 20:57

## 2024-11-16 RX ADMIN — DEXTROMETHORPHAN POLISTIREX 60 MG: 30 SUSPENSION ORAL at 20:57

## 2024-11-16 RX ADMIN — METRONIDAZOLE 500 MG: 500 INJECTION, SOLUTION INTRAVENOUS at 05:06

## 2024-11-16 RX ADMIN — ACETAMINOPHEN 650 MG: 325 TABLET ORAL at 20:57

## 2024-11-16 RX ADMIN — METRONIDAZOLE 500 MG: 500 INJECTION, SOLUTION INTRAVENOUS at 21:00

## 2024-11-16 RX ADMIN — AMLODIPINE BESYLATE 5 MG: 5 TABLET ORAL at 10:16

## 2024-11-16 RX ADMIN — CARVEDILOL 6.25 MG: 3.12 TABLET, FILM COATED ORAL at 16:52

## 2024-11-16 RX ADMIN — METRONIDAZOLE 500 MG: 500 INJECTION, SOLUTION INTRAVENOUS at 12:37

## 2024-11-16 RX ADMIN — SODIUM CHLORIDE: 9 INJECTION, SOLUTION INTRAVENOUS at 12:28

## 2024-11-16 RX ADMIN — MELOXICAM 15 MG: 7.5 TABLET ORAL at 10:13

## 2024-11-16 RX ADMIN — SODIUM CHLORIDE, PRESERVATIVE FREE 10 ML: 5 INJECTION INTRAVENOUS at 12:20

## 2024-11-16 RX ADMIN — CARVEDILOL 6.25 MG: 3.12 TABLET, FILM COATED ORAL at 10:15

## 2024-11-16 RX ADMIN — ENOXAPARIN SODIUM 40 MG: 100 INJECTION SUBCUTANEOUS at 10:15

## 2024-11-16 RX ADMIN — DEXTROMETHORPHAN POLISTIREX 60 MG: 30 SUSPENSION ORAL at 10:18

## 2024-11-16 RX ADMIN — CITALOPRAM HYDROBROMIDE 20 MG: 20 TABLET ORAL at 10:15

## 2024-11-16 ASSESSMENT — PAIN SCALES - GENERAL
PAINLEVEL_OUTOF10: 0
PAINLEVEL_OUTOF10: 3

## 2024-11-16 ASSESSMENT — PAIN DESCRIPTION - DESCRIPTORS: DESCRIPTORS: NUMBNESS

## 2024-11-16 ASSESSMENT — PAIN DESCRIPTION - ORIENTATION: ORIENTATION: RIGHT;POSTERIOR

## 2024-11-16 ASSESSMENT — PAIN DESCRIPTION - LOCATION: LOCATION: BACK

## 2024-11-16 NOTE — PROGRESS NOTES
Pulmonary Progress Note  [facility]      Patient - Clary Harris,  Age - 79 y.o.    - 1945      Room Number - 343/343-01   N -  [id number]   [facility] # - [id number]  Date of Admission -  2024  9:06 PM        Consulting Service/Physician   Consulting - Luis Galvan MD  Primary Care Physician - Kandi Thompson DO     SUBJECTIVE   Patient appears to be in very good spirits.  Chest tubes to waterseal at this time.    OBJECTIVE   VITALS    height is 1.651 m (5' 5\") and weight is 83.9 kg (185 lb). Her oral temperature is 99 °F (37.2 °C). Her blood pressure is 109/71 and her pulse is 67. Her respiration is 20 and oxygen saturation is 97%.     Body mass index is 30.79 kg/m².  Temperature Range: Temp: 99 °F (37.2 °C) Temp  Av.3 °F (36.8 °C)  Min: 97.7 °F (36.5 °C)  Max: 99 °F (37.2 °C)  BP Range:  Systolic (24hrs), Av , Min:101 , Max:138     Diastolic (24hrs), Av, Min:69, Max:97    Pulse Range: Pulse  Av.5  Min: 61  Max: 67  Respiration Range: Resp  Av.7  Min: 17  Max: 22  Current Pulse Ox::  SpO2: 97 %  24HR Pulse Ox Range:  SpO2  Av %  Min: 93 %  Max: 97 %  Oxygen Amount and Delivery: O2 Flow Rate (L/min): 2 L/min    Wt Readings from Last 3 Encounters:   24 83.9 kg (185 lb)   06/10/24 87.7 kg (193 lb 6.4 oz)   23 88.9 kg (196 lb)       I/O (24 Hours)    Intake/Output Summary (Last 24 hours) at 2024 1727  Last data filed at 2024 1540  Gross per 24 hour   Intake 200 ml   Output 635 ml   Net -435 ml       EXAM     General Appearance  Awake, alert, oriented, in no acute distress  HEENT - normocephalic, atraumatic.  Neck - Supple,  trachea midline   Lungs -decreased breath sounds at the bases on the right  Heart Exam:PMI normal. No lifts, heaves, or thrills. RRR. No murmurs, clicks, gallops, or rubs  Abdomen Exam: Abdomen soft, non-tender.  Extremity Exam: No signs of cyanosis    MEDS      cefTRIAXone (ROCEPHIN) IV  1,000

## 2024-11-16 NOTE — PLAN OF CARE
Problem: Chronic Conditions and Co-morbidities  Goal: Patient's chronic conditions and co-morbidity symptoms are monitored and maintained or improved  11/15/2024 2140 by Nicki Ford RN  Outcome: Progressing  Flowsheets (Taken 11/15/2024 2000)  Care Plan - Patient's Chronic Conditions and Co-Morbidity Symptoms are Monitored and Maintained or Improved: Monitor and assess patient's chronic conditions and comorbid symptoms for stability, deterioration, or improvement     Problem: Discharge Planning  Goal: Discharge to home or other facility with appropriate resources  11/15/2024 2140 by Nicki Ford RN  Outcome: Progressing  Flowsheets (Taken 11/15/2024 2000)  Discharge to home or other facility with appropriate resources: Identify barriers to discharge with patient and caregiver     Problem: Pain  Goal: Verbalizes/displays adequate comfort level or baseline comfort level  11/15/2024 2140 by Nicki Ford RN  Outcome: Progressing   NO NEW SIGNS OR SYMPTOMS OF PAIN NOTED, PAIN RATING <3 ON SCALE OF 0-10 THIS SHIFT. PAIN CONTROLLED WITH MEDICATION AND REPOSITIONING.  Problem: Respiratory - Adult  Goal: Achieves optimal ventilation and oxygenation  11/15/2024 2140 by Nicki Ford RN  Outcome: Progressing  Flowsheets (Taken 11/15/2024 2000)  Achieves optimal ventilation and oxygenation: Assess for changes in respiratory status     Problem: Safety - Adult  Goal: Free from fall injury  11/15/2024 2140 by Nicki Ford RN  Outcome: Progressing   NO FALLS OR INJURIES THIS SHIFT, BED IN LOWEST POSITION, BRAKES ON, BED ALARM ON, CALL LIGHT IN REACH, SIDE RAILS UP X2.  Problem: Neurosensory - Adult  Goal: Achieves stable or improved neurological status  11/15/2024 2140 by Nicki Ford RN  Outcome: Progressing  Flowsheets (Taken 11/15/2024 2000)  Achieves stable or improved neurological status: Assess for and report changes in neurological status     Problem:

## 2024-11-16 NOTE — PROGRESS NOTES
[facility]  IN-PATIENT SERVICE   [facility]    Progress Note    11/16/2024    8:57 AM    Name:   Clary Harris  MRN:     [id number]     Acct:      [id number]   Room:   [address] [place] [address] Day:  14  Admit Date:  11/2/2024  9:06 PM    PCP:   Kandi Thompson,   Code Status:  Full Code    Subjective:     Patient is continuing to do well.  The pain in her right chest is a little better today.  She got some lasix last night but did not urinate as much as she was expecting.  She is eating and drinking well.      Medications:     Allergies:    Allergies   Allergen Reactions    Hydrochlorothiazide     Sulfa Antibiotics Hives, Other (See Comments) and Itching       Current Meds:   Scheduled Meds:    cefTRIAXone (ROCEPHIN) IV  1,000 mg IntraVENous Q24H    metroNIDAZOLE  500 mg IntraVENous Q8H    insulin lispro  3-15 Units SubCUTAneous TID WC    Budeson-Glycopyrrol-Formoterol  2 puff Inhalation BID    levothyroxine  100 mcg Oral Daily    meloxicam  15 mg Oral Daily    traZODone  100 mg Oral Nightly    dextromethorphan  60 mg Oral 2 times per day    sodium chloride  80 mL IntraVENous Once    sodium chloride flush  5-40 mL IntraVENous 2 times per day    enoxaparin  40 mg SubCUTAneous Daily    amLODIPine  5 mg Oral Daily    valsartan  80 mg Oral Daily    carvedilol  6.25 mg Oral BID WC    citalopram  20 mg Oral Daily    pantoprazole  40 mg Oral QAM AC     Continuous Infusions:    dextrose      sodium chloride Stopped (11/07/24 0433)     PRN Meds: glucose, dextrose bolus **OR** dextrose bolus, glucagon (rDNA), dextrose, albuterol, benzonatate, sodium chloride flush, sodium chloride, potassium chloride **OR** potassium alternative oral replacement **OR** potassium chloride, magnesium sulfate, ondansetron **OR** ondansetron, polyethylene glycol, acetaminophen **OR** acetaminophen    Data:     Vitals:  /89   Pulse 67   Temp 97.7 °F (36.5 °C) (Oral)   Resp 22   Ht 1.651 m (5' 5\")

## 2024-11-17 ENCOUNTER — APPOINTMENT (OUTPATIENT)
Dept: GENERAL RADIOLOGY | Age: 79
DRG: 193 | End: 2024-11-17
Payer: MEDICARE

## 2024-11-17 LAB
ANION GAP SERPL CALCULATED.3IONS-SCNC: 4 MMOL/L (ref 9–17)
BASOPHILS # BLD: 0.1 K/UL (ref 0–0.2)
BASOPHILS NFR BLD: 2 % (ref 0–2)
BUN SERPL-MCNC: 16 MG/DL (ref 8–23)
CALCIUM SERPL-MCNC: 9 MG/DL (ref 8.6–10.4)
CHLORIDE SERPL-SCNC: 107 MMOL/L (ref 98–107)
CO2 SERPL-SCNC: 32 MMOL/L (ref 20–31)
CREAT SERPL-MCNC: 0.6 MG/DL (ref 0.5–0.9)
EOSINOPHIL # BLD: 0.1 K/UL (ref 0–0.4)
EOSINOPHILS RELATIVE PERCENT: 2 % (ref 1–4)
ERYTHROCYTE [DISTWIDTH] IN BLOOD BY AUTOMATED COUNT: 15.8 % (ref 12.5–15.4)
GFR, ESTIMATED: >90 ML/MIN/1.73M2
GLUCOSE BLD-MCNC: 111 MG/DL (ref 65–105)
GLUCOSE BLD-MCNC: 121 MG/DL (ref 65–105)
GLUCOSE BLD-MCNC: 167 MG/DL (ref 65–105)
GLUCOSE BLD-MCNC: 212 MG/DL (ref 65–105)
GLUCOSE SERPL-MCNC: 106 MG/DL (ref 70–99)
HCT VFR BLD AUTO: 34.2 % (ref 36–46)
HGB BLD-MCNC: 11.1 G/DL (ref 12–16)
LYMPHOCYTES NFR BLD: 1.5 K/UL (ref 1–4.8)
LYMPHOCYTES RELATIVE PERCENT: 20 % (ref 24–44)
MCH RBC QN AUTO: 28.6 PG (ref 26–34)
MCHC RBC AUTO-ENTMCNC: 32.5 G/DL (ref 31–37)
MCV RBC AUTO: 88 FL (ref 80–100)
MICROORGANISM SPEC CULT: NORMAL
MICROORGANISM/AGENT SPEC: NORMAL
MONOCYTES NFR BLD: 0.8 K/UL (ref 0.1–1.2)
MONOCYTES NFR BLD: 10 % (ref 2–11)
NEUTROPHILS NFR BLD: 66 % (ref 36–66)
NEUTS SEG NFR BLD: 4.9 K/UL (ref 1.8–7.7)
PLATELET # BLD AUTO: 469 K/UL (ref 140–450)
PMV BLD AUTO: 7.3 FL (ref 6–12)
POTASSIUM SERPL-SCNC: 4.1 MMOL/L (ref 3.7–5.3)
RBC # BLD AUTO: 3.89 M/UL (ref 4–5.2)
SODIUM SERPL-SCNC: 143 MMOL/L (ref 135–144)
SPECIMEN DESCRIPTION: NORMAL
WBC OTHER # BLD: 7.4 K/UL (ref 3.5–11)

## 2024-11-17 PROCEDURE — 94761 N-INVAS EAR/PLS OXIMETRY MLT: CPT

## 2024-11-17 PROCEDURE — 71045 X-RAY EXAM CHEST 1 VIEW: CPT

## 2024-11-17 PROCEDURE — 2060000000 HC ICU INTERMEDIATE R&B

## 2024-11-17 PROCEDURE — 6370000000 HC RX 637 (ALT 250 FOR IP): Performed by: FAMILY MEDICINE

## 2024-11-17 PROCEDURE — 80048 BASIC METABOLIC PNL TOTAL CA: CPT

## 2024-11-17 PROCEDURE — 2700000000 HC OXYGEN THERAPY PER DAY

## 2024-11-17 PROCEDURE — 36415 COLL VENOUS BLD VENIPUNCTURE: CPT

## 2024-11-17 PROCEDURE — 6360000002 HC RX W HCPCS: Performed by: INTERNAL MEDICINE

## 2024-11-17 PROCEDURE — 82947 ASSAY GLUCOSE BLOOD QUANT: CPT

## 2024-11-17 PROCEDURE — 85025 COMPLETE CBC W/AUTO DIFF WBC: CPT

## 2024-11-17 PROCEDURE — 3E0L3GC INTRODUCTION OF OTHER THERAPEUTIC SUBSTANCE INTO PLEURAL CAVITY, PERCUTANEOUS APPROACH: ICD-10-PCS | Performed by: INTERNAL MEDICINE

## 2024-11-17 PROCEDURE — 6370000000 HC RX 637 (ALT 250 FOR IP): Performed by: NURSE PRACTITIONER

## 2024-11-17 PROCEDURE — 2580000003 HC RX 258: Performed by: FAMILY MEDICINE

## 2024-11-17 PROCEDURE — 6360000002 HC RX W HCPCS: Performed by: FAMILY MEDICINE

## 2024-11-17 PROCEDURE — 2580000003 HC RX 258: Performed by: INTERNAL MEDICINE

## 2024-11-17 PROCEDURE — 99232 SBSQ HOSP IP/OBS MODERATE 35: CPT | Performed by: FAMILY MEDICINE

## 2024-11-17 RX ADMIN — LEVOTHYROXINE SODIUM 100 MCG: 50 TABLET ORAL at 05:08

## 2024-11-17 RX ADMIN — DORNASE ALFA 5 MG: 1 SOLUTION RESPIRATORY (INHALATION) at 15:43

## 2024-11-17 RX ADMIN — DEXTROMETHORPHAN POLISTIREX 60 MG: 30 SUSPENSION ORAL at 09:25

## 2024-11-17 RX ADMIN — SODIUM CHLORIDE, PRESERVATIVE FREE 10 ML: 5 INJECTION INTRAVENOUS at 21:50

## 2024-11-17 RX ADMIN — TRAZODONE HYDROCHLORIDE 100 MG: 100 TABLET ORAL at 23:40

## 2024-11-17 RX ADMIN — METRONIDAZOLE 500 MG: 500 INJECTION, SOLUTION INTRAVENOUS at 13:37

## 2024-11-17 RX ADMIN — AMLODIPINE BESYLATE 5 MG: 5 TABLET ORAL at 09:24

## 2024-11-17 RX ADMIN — METRONIDAZOLE 500 MG: 500 INJECTION, SOLUTION INTRAVENOUS at 21:54

## 2024-11-17 RX ADMIN — MELOXICAM 15 MG: 7.5 TABLET ORAL at 09:24

## 2024-11-17 RX ADMIN — ACETAMINOPHEN 650 MG: 325 TABLET ORAL at 23:40

## 2024-11-17 RX ADMIN — CARVEDILOL 6.25 MG: 3.12 TABLET, FILM COATED ORAL at 18:30

## 2024-11-17 RX ADMIN — Medication 1000 MG: at 13:33

## 2024-11-17 RX ADMIN — SODIUM CHLORIDE, PRESERVATIVE FREE 10 ML: 5 INJECTION INTRAVENOUS at 09:24

## 2024-11-17 RX ADMIN — METRONIDAZOLE 500 MG: 500 INJECTION, SOLUTION INTRAVENOUS at 05:10

## 2024-11-17 RX ADMIN — CITALOPRAM HYDROBROMIDE 20 MG: 20 TABLET ORAL at 09:23

## 2024-11-17 RX ADMIN — ALTEPLASE 10 MG: 2.2 INJECTION, POWDER, LYOPHILIZED, FOR SOLUTION INTRAVENOUS at 15:24

## 2024-11-17 RX ADMIN — PANTOPRAZOLE SODIUM 40 MG: 40 TABLET, DELAYED RELEASE ORAL at 05:08

## 2024-11-17 RX ADMIN — VALSARTAN 80 MG: 160 TABLET, FILM COATED ORAL at 09:24

## 2024-11-17 RX ADMIN — ALTEPLASE 10 MG: 2.2 INJECTION, POWDER, LYOPHILIZED, FOR SOLUTION INTRAVENOUS at 15:43

## 2024-11-17 RX ADMIN — CARVEDILOL 6.25 MG: 3.12 TABLET, FILM COATED ORAL at 09:23

## 2024-11-17 RX ADMIN — ENOXAPARIN SODIUM 40 MG: 100 INJECTION SUBCUTANEOUS at 09:24

## 2024-11-17 RX ADMIN — DEXTROMETHORPHAN POLISTIREX 60 MG: 30 SUSPENSION ORAL at 21:57

## 2024-11-17 ASSESSMENT — PAIN DESCRIPTION - FREQUENCY: FREQUENCY: INTERMITTENT

## 2024-11-17 ASSESSMENT — PAIN DESCRIPTION - DESCRIPTORS
DESCRIPTORS: JABBING
DESCRIPTORS: JABBING

## 2024-11-17 ASSESSMENT — PAIN DESCRIPTION - ORIENTATION
ORIENTATION: RIGHT;MID
ORIENTATION: RIGHT;MID

## 2024-11-17 ASSESSMENT — PAIN DESCRIPTION - LOCATION
LOCATION: RIB CAGE
LOCATION: RIB CAGE

## 2024-11-17 ASSESSMENT — PAIN DESCRIPTION - PAIN TYPE: TYPE: ACUTE PAIN

## 2024-11-17 ASSESSMENT — PAIN SCALES - GENERAL
PAINLEVEL_OUTOF10: 2
PAINLEVEL_OUTOF10: 1

## 2024-11-17 NOTE — PROCEDURES
PROCEDURE NOTE  Date: 11/17/2024   Name: Clary Harris  YOB: 1945    Procedures:    Instillation of alteplase followed by dornase alpha on both pleural cath    Indication prediagnosis    Persistent pockets of fluid.  Purpose is to see if we can provide enzymatic degradation of the pockets of fluid to remove more fluid if possible      Post diagnosis:    Same    Patient was placed in the left lateral decubitus position.  We were able to expose without difficulty the three-way stopcock.  We did use ChloraPrep to clean the entrance of one of the three-way stopcock's for instillation of the alteplase and dornase alpha    We were able to place alteplase 10 mg in 0.9 normal saline 30 mL into both catheters without difficulty.  This was flushed with saline.  This is followed by instillation of 30 mL of 5 mg of dornase alpha.  Flushing was without incident.    Patient tolerated procedure with no complications    Estimated blood loss 0    The plan is to put the catheters to clamp position and then after 90 minutes both catheters will be placed to wall suction    Jose Ramon Oh MD

## 2024-11-17 NOTE — PROGRESS NOTES
Pt's chest tube unclamped at 1715 per pulmonology. Writer assisted pt to the side of the bed. Writer noticed that #3 chest tube was draining from site. Pt stood up to use the bathroom and chest tube fell out. Sutures still in place on skin. Tube intact upon removal. VS obtained. Breath sounds diminished on right side same as previous assessment. Dr. Oh notified. Stat chest x-ray ordered. Pt in chair with call light within reach.

## 2024-11-17 NOTE — PLAN OF CARE
Problem: Chronic Conditions and Co-morbidities  Goal: Patient's chronic conditions and co-morbidity symptoms are monitored and maintained or improved  11/16/2024 1937 by Ke Cuevas, RN  Outcome: Progressing     Problem: Pain  Goal: Verbalizes/displays adequate comfort level or baseline comfort level  11/16/2024 1937 by Ke Cuevas, RN  Outcome: Progressing

## 2024-11-17 NOTE — PROGRESS NOTES
[facility]  IN-PATIENT SERVICE   [facility]    Progress Note    11/17/2024    10:10 AM    Name:   Clary Harris  MRN:     [id number]     Acct:      [id number]   Room:   [address] [place] [address] Day:  15  Admit Date:  11/2/2024  9:06 PM    PCP:   Kandi Thompson, DO  Code Status:  Full Code    Subjective:     Patient is doing well.  She denies any chest pain today.  She slept well overnight.  Nursing did trial on room air, but she dropped to mid 80s.  She is now in low to mid 90s on 1LNC.      Medications:     Allergies:    Allergies   Allergen Reactions    Hydrochlorothiazide     Sulfa Antibiotics Hives, Other (See Comments) and Itching       Current Meds:   Scheduled Meds:    cefTRIAXone (ROCEPHIN) IV  1,000 mg IntraVENous Q24H    metroNIDAZOLE  500 mg IntraVENous Q8H    insulin lispro  3-15 Units SubCUTAneous TID WC    Budeson-Glycopyrrol-Formoterol  2 puff Inhalation BID    levothyroxine  100 mcg Oral Daily    meloxicam  15 mg Oral Daily    traZODone  100 mg Oral Nightly    dextromethorphan  60 mg Oral 2 times per day    sodium chloride  80 mL IntraVENous Once    sodium chloride flush  5-40 mL IntraVENous 2 times per day    enoxaparin  40 mg SubCUTAneous Daily    amLODIPine  5 mg Oral Daily    valsartan  80 mg Oral Daily    carvedilol  6.25 mg Oral BID WC    citalopram  20 mg Oral Daily    pantoprazole  40 mg Oral QAM AC     Continuous Infusions:    dextrose      sodium chloride 10 mL/hr at 11/16/24 1228     PRN Meds: glucose, dextrose bolus **OR** dextrose bolus, glucagon (rDNA), dextrose, albuterol, benzonatate, sodium chloride flush, sodium chloride, potassium chloride **OR** potassium alternative oral replacement **OR** potassium chloride, magnesium sulfate, ondansetron **OR** ondansetron, polyethylene glycol, acetaminophen **OR** acetaminophen    Data:     Vitals:  /85   Pulse 74   Temp 98.2 °F (36.8 °C) (Oral)   Resp 18   Ht 1.651 m (5' 5\")   Wt 83.9 kg  auscultation bilaterally, normal effort  Heart:  regular rate and rhythm, no murmur  Abdomen:  soft, nontender, nondistended, normal bowel sounds, no masses, hepatomegaly, splenomegaly  Extremities:  no edema, redness, tenderness in the calves  Skin:  no gross lesions, rashes, induration    Assessment:     Hospital Problems             Last Modified POA    * (Principal) Pneumonia due to infectious agent 11/2/2024 Yes    COPD (chronic obstructive pulmonary disease) ([facility]) 11/3/2024 Yes    Hypertension 11/3/2024 Yes    Hypothyroidism 11/3/2024 Yes    Mild valvular heart disease 11/3/2024 Yes    Obstructive sleep apnea 11/13/2024 Yes    Type 2 diabetes mellitus without complication ([facility]) 11/3/2024 Yes    Chest pain 11/8/2024 Yes    Pleural effusion associated with pulmonary infection 11/13/2024 Yes    Protein calorie malnutrition ([facility]) 11/13/2024 Yes       Plan:     CAP with parapneumonic effusion- loculated.  11/12- Removed CT and placement 2 pigtail chest tubes, off suction now.  On Rocephin and flagyl.  SP tPA. CT chest does show improvement.  Will await pulm's recommendations regarding tubes. Oxygen requirements improved to 1LNC.  COPD- mild on breztri  Elevated liver enzymes, improving.  Probably due to unasyn.  Liver US- mild hepatic steatosis.  Off crestor short term.   DM- hba1c 6.1, currently off ozempic due to hospital non-formulary. Continue SS.   HTN- BP controlled on norvasc, coreg, diovan  Insomnia/depression- controlled on trazodone/celexa  Hypothyroidism- continue synthroid 100mcg  On lovenox daily, PT/OT  Discharge- possible home with home health tomorrow, will need home health for IV abx and will need home oxygen    Medical Decision Making: Medium      Xiomara Palm MD  11/17/2024  10:10 AM

## 2024-11-17 NOTE — PLAN OF CARE
Problem: Chronic Conditions and Co-morbidities  Goal: Patient's chronic conditions and co-morbidity symptoms are monitored and maintained or improved  Outcome: Progressing  Flowsheets  Taken 11/17/2024 1243  Care Plan - Patient's Chronic Conditions and Co-Morbidity Symptoms are Monitored and Maintained or Improved:   Monitor and assess patient's chronic conditions and comorbid symptoms for stability, deterioration, or improvement   Collaborate with multidisciplinary team to address chronic and comorbid conditions and prevent exacerbation or deterioration   Update acute care plan with appropriate goals if chronic or comorbid symptoms are exacerbated and prevent overall improvement and discharge  Taken 11/17/2024 0724  Care Plan - Patient's Chronic Conditions and Co-Morbidity Symptoms are Monitored and Maintained or Improved:   Monitor and assess patient's chronic conditions and comorbid symptoms for stability, deterioration, or improvement   Collaborate with multidisciplinary team to address chronic and comorbid conditions and prevent exacerbation or deterioration   Update acute care plan with appropriate goals if chronic or comorbid symptoms are exacerbated and prevent overall improvement and discharge     Problem: Discharge Planning  Goal: Discharge to home or other facility with appropriate resources  Outcome: Progressing  Flowsheets  Taken 11/17/2024 1243  Discharge to home or other facility with appropriate resources:   Identify barriers to discharge with patient and caregiver   Arrange for needed discharge resources and transportation as appropriate   Identify discharge learning needs (meds, wound care, etc)   Refer to discharge planning if patient needs post-hospital services based on physician order or complex needs related to functional status, cognitive ability or social support system  Taken 11/17/2024 0724  Discharge to home or other facility with appropriate resources:   Identify barriers to discharge  standing, transferring and ambulating with or without assistive devices   Assist with transfers and ambulation using safe patient handling equipment as needed  Taken 11/17/2024 0724  Return Mobility to Safest Level of Function:   Assess patient stability and activity tolerance for standing, transferring and ambulating with or without assistive devices   Assist with transfers and ambulation using safe patient handling equipment as needed     Problem: Musculoskeletal - Adult  Goal: Maintain proper alignment of affected body part  Outcome: Progressing     Problem: Gastrointestinal - Adult  Goal: Minimal or absence of nausea and vomiting  Outcome: Progressing  Flowsheets (Taken 11/17/2024 0724)  Minimal or absence of nausea and vomiting: Administer IV fluids as ordered to ensure adequate hydration     Problem: Infection - Adult  Goal: Absence of infection at discharge  Outcome: Progressing  Flowsheets  Taken 11/17/2024 1243  Absence of infection at discharge: Assess and monitor for signs and symptoms of infection  Taken 11/17/2024 0724  Absence of infection at discharge:   Assess and monitor for signs and symptoms of infection   Monitor lab/diagnostic results     Problem: Metabolic/Fluid and Electrolytes - Adult  Goal: Electrolytes maintained within normal limits  Outcome: Progressing  Flowsheets  Taken 11/17/2024 1243  Electrolytes maintained within normal limits: Monitor labs and assess patient for signs and symptoms of electrolyte imbalances  Taken 11/17/2024 0724  Electrolytes maintained within normal limits:   Monitor labs and assess patient for signs and symptoms of electrolyte imbalances   Administer electrolyte replacement as ordered     Problem: Hematologic - Adult  Goal: Maintains hematologic stability  Outcome: Progressing  Flowsheets  Taken 11/17/2024 1243  Maintains hematologic stability: Assess for signs and symptoms of bleeding or hemorrhage  Taken 11/17/2024 0724  Maintains hematologic stability: Assess

## 2024-11-17 NOTE — PLAN OF CARE
Problem: Chronic Conditions and Co-morbidities  Goal: Patient's chronic conditions and co-morbidity symptoms are monitored and maintained or improved  11/16/2024 2024 by Nicki Ford RN  Outcome: Progressing  Flowsheets (Taken 11/16/2024 2000)  Care Plan - Patient's Chronic Conditions and Co-Morbidity Symptoms are Monitored and Maintained or Improved: Monitor and assess patient's chronic conditions and comorbid symptoms for stability, deterioration, or improvement     Problem: Discharge Planning  Goal: Discharge to home or other facility with appropriate resources  11/16/2024 2024 by Nicki Ford RN  Outcome: Progressing  Flowsheets (Taken 11/16/2024 2000)  Discharge to home or other facility with appropriate resources: Identify barriers to discharge with patient and caregiver     Problem: Pain  Goal: Verbalizes/displays adequate comfort level or baseline comfort level  11/16/2024 2024 by Nicki Ford RN  Outcome: Progressing     Problem: Respiratory - Adult  Goal: Achieves optimal ventilation and oxygenation  11/16/2024 2024 by Nicki Ford RN  Outcome: Progressing  Flowsheets (Taken 11/16/2024 2000)  Achieves optimal ventilation and oxygenation: Assess for changes in respiratory status     Problem: Safety - Adult  Goal: Free from fall injury  11/16/2024 2024 by Nicki Ford RN  Outcome: Progressing     Problem: Neurosensory - Adult  Goal: Achieves stable or improved neurological status  11/16/2024 2024 by Nicki Ford RN  Outcome: Progressing  Flowsheets (Taken 11/16/2024 2000)  Achieves stable or improved neurological status: Assess for and report changes in neurological status     Problem: Cardiovascular - Adult  Goal: Maintains optimal cardiac output and hemodynamic stability  11/16/2024 2024 by Nicki Ford RN  Outcome: Progressing  Flowsheets (Taken 11/16/2024 2000)  Maintains optimal cardiac output and hemodynamic stability: Monitor

## 2024-11-17 NOTE — PROGRESS NOTES
Pulmonary Progress Note  [facility]      Patient - Clary Harris,  Age - 79 y.o.    - 1945      Room Number - 343/343-01   N -  [id number]   [facility] # - [id number]  Date of Admission -  2024  9:06 PM    Consulting Service/Physician   Consulting - Luis Galvan MD  Primary Care Physician - Kandi Thompson DO     SUBJECTIVE   Patient appears to in good spirits.  No acute distress at this time    OBJECTIVE   VITALS    height is 1.651 m (5' 5\") and weight is 83.9 kg (185 lb). Her oral temperature is 98.2 °F (36.8 °C). Her blood pressure is 131/80 and her pulse is 67. Her respiration is 30 and oxygen saturation is 96%.     Body mass index is 30.79 kg/m².  Temperature Range: Temp: 98.2 °F (36.8 °C) Temp  Av.7 °F (37.1 °C)  Min: 98.2 °F (36.8 °C)  Max: 99 °F (37.2 °C)  BP Range:  Systolic (24hrs), Av , Min:109 , Max:140     Diastolic (24hrs), Av, Min:71, Max:98    Pulse Range: Pulse  Av.7  Min: 63  Max: 76  Respiration Range: Resp  Av.8  Min: 13  Max: 30  Current Pulse Ox::  SpO2: 96 %  24HR Pulse Ox Range:  SpO2  Av.5 %  Min: 85 %  Max: 96 %  Oxygen Amount and Delivery: O2 Flow Rate (L/min): 1 L/min    Wt Readings from Last 3 Encounters:   24 83.9 kg (185 lb)   06/10/24 87.7 kg (193 lb 6.4 oz)   23 88.9 kg (196 lb)       I/O (24 Hours)    Intake/Output Summary (Last 24 hours) at 2024 1501  Last data filed at 2024 1900  Gross per 24 hour   Intake 180 ml   Output 305 ml   Net -125 ml       EXAM     General Appearance  Awake, alert, oriented, in no acute distress  HEENT - normocephalic, atraumatic.  Neck - Supple,  trachea midline   Lungs -coarse breath sounds at the bases posteriorly on the right  Heart Exam:PMI normal. No lifts, heaves, or thrills. RRR. No murmurs, clicks, gallops, or rubs  Abdomen Exam: Abdomen soft, non-tender. BS normal.  Extremity Exam: No signs of cyanosis    MEDS      ALTEplase (CATHFLO) 10 mg in  [facility], not a current patient  KARENA, previous sleep study at [facility], severity unknown, was on a CPAP for several years but simply stopped using it and turned it back into the [employer], she did not recall the name of the [employer] company leukocytosis related to pneumonia  Former tobacco use, quitting 30 years ago, 22 pack year history age 19 to age 41 approximately 1 pack/day she tells me  Leukocytosis, improving  Elevated liver enzymes felt to be potentially related to side effect of Unasyn, improved  Mild hypernatremia, resolved  Mild hypokalemia, resolved  Full Code    I spoke to Dr. Libertad Wright, radiologist.  I believe I do see some residual fluid pockets.  After reviewing the CT scan she concurred    My recommendation is to place intrapleural alteplase as well as dornase alpha on both pockets of fluid followed by clamping the pleural catheters or after and then after 2 hours putting the catheters back to suction.  I explained the process to the patient.  She is very familiar with the process.    I did speak to pharmacy about providing the medications.  Appreciate their efforts     Will proceed      Electronically signed by Jose Ramon Oh MD on 11/17/2024 at 3:01 PM

## 2024-11-17 NOTE — PLAN OF CARE
Problem: Chronic Conditions and Co-morbidities  Goal: Patient's chronic conditions and co-morbidity symptoms are monitored and maintained or improved  Outcome: Progressing  Flowsheets  Taken 11/16/2024 1659 by Ke Cuevas RN  Care Plan - Patient's Chronic Conditions and Co-Morbidity Symptoms are Monitored and Maintained or Improved:   Monitor and assess patient's chronic conditions and comorbid symptoms for stability, deterioration, or improvement   Collaborate with multidisciplinary team to address chronic and comorbid conditions and prevent exacerbation or deterioration  Taken 11/16/2024 0845 by Lula Rayo RN  Care Plan - Patient's Chronic Conditions and Co-Morbidity Symptoms are Monitored and Maintained or Improved:   Monitor and assess patient's chronic conditions and comorbid symptoms for stability, deterioration, or improvement   Collaborate with multidisciplinary team to address chronic and comorbid conditions and prevent exacerbation or deterioration   Update acute care plan with appropriate goals if chronic or comorbid symptoms are exacerbated and prevent overall improvement and discharge     Problem: Discharge Planning  Goal: Discharge to home or other facility with appropriate resources  Outcome: Progressing  Flowsheets  Taken 11/16/2024 1659 by Ke Cuevas RN  Discharge to home or other facility with appropriate resources:   Identify barriers to discharge with patient and caregiver   Arrange for needed discharge resources and transportation as appropriate   Identify discharge learning needs (meds, wound care, etc)   Arrange for interpreters to assist at discharge as needed   Refer to discharge planning if patient needs post-hospital services based on physician order or complex needs related to functional status, cognitive ability or social support system  Taken 11/16/2024 0845 by Lula Rayo RN  Discharge to home or other facility with appropriate resources:   Identify barriers to

## 2024-11-18 ENCOUNTER — APPOINTMENT (OUTPATIENT)
Dept: GENERAL RADIOLOGY | Age: 79
DRG: 193 | End: 2024-11-18
Payer: MEDICARE

## 2024-11-18 LAB
ALBUMIN SERPL-MCNC: 2.8 G/DL (ref 3.5–5.2)
ALBUMIN/GLOB SERPL: 0.8 {RATIO} (ref 1–2.5)
ALP SERPL-CCNC: 80 U/L (ref 35–104)
ALT SERPL-CCNC: 14 U/L (ref 5–33)
ANION GAP SERPL CALCULATED.3IONS-SCNC: 7 MMOL/L (ref 9–17)
AST SERPL-CCNC: 11 U/L
BASOPHILS # BLD: 0.1 K/UL (ref 0–0.2)
BASOPHILS NFR BLD: 1 % (ref 0–2)
BILIRUB DIRECT SERPL-MCNC: 0.1 MG/DL
BILIRUB INDIRECT SERPL-MCNC: 0.2 MG/DL (ref 0–1)
BILIRUB SERPL-MCNC: 0.3 MG/DL (ref 0.3–1.2)
BUN SERPL-MCNC: 14 MG/DL (ref 8–23)
CALCIUM SERPL-MCNC: 8.7 MG/DL (ref 8.6–10.4)
CHLORIDE SERPL-SCNC: 103 MMOL/L (ref 98–107)
CO2 SERPL-SCNC: 29 MMOL/L (ref 20–31)
CREAT SERPL-MCNC: 0.6 MG/DL (ref 0.5–0.9)
EOSINOPHIL # BLD: 0.1 K/UL (ref 0–0.4)
EOSINOPHILS RELATIVE PERCENT: 1 % (ref 1–4)
ERYTHROCYTE [DISTWIDTH] IN BLOOD BY AUTOMATED COUNT: 15.8 % (ref 12.5–15.4)
GFR, ESTIMATED: >90 ML/MIN/1.73M2
GLUCOSE BLD-MCNC: 126 MG/DL (ref 65–105)
GLUCOSE BLD-MCNC: 126 MG/DL (ref 65–105)
GLUCOSE BLD-MCNC: 145 MG/DL (ref 65–105)
GLUCOSE BLD-MCNC: 192 MG/DL (ref 65–105)
GLUCOSE SERPL-MCNC: 144 MG/DL (ref 70–99)
HCT VFR BLD AUTO: 36.2 % (ref 36–46)
HGB BLD-MCNC: 11.7 G/DL (ref 12–16)
LYMPHOCYTES NFR BLD: 1.1 K/UL (ref 1–4.8)
LYMPHOCYTES RELATIVE PERCENT: 10 % (ref 24–44)
MCH RBC QN AUTO: 28.5 PG (ref 26–34)
MCHC RBC AUTO-ENTMCNC: 32.3 G/DL (ref 31–37)
MCV RBC AUTO: 88.4 FL (ref 80–100)
MICROORGANISM SPEC CULT: NORMAL
MICROORGANISM SPEC CULT: NORMAL
MICROORGANISM/AGENT SPEC: NORMAL
MICROORGANISM/AGENT SPEC: NORMAL
MONOCYTES NFR BLD: 1.1 K/UL (ref 0.1–1.2)
MONOCYTES NFR BLD: 10 % (ref 2–11)
NEUTROPHILS NFR BLD: 78 % (ref 36–66)
NEUTS SEG NFR BLD: 8.5 K/UL (ref 1.8–7.7)
PLATELET # BLD AUTO: 485 K/UL (ref 140–450)
PMV BLD AUTO: 7.1 FL (ref 6–12)
POTASSIUM SERPL-SCNC: 4.2 MMOL/L (ref 3.7–5.3)
PROT SERPL-MCNC: 6.1 G/DL (ref 6.4–8.3)
RBC # BLD AUTO: 4.1 M/UL (ref 4–5.2)
SODIUM SERPL-SCNC: 139 MMOL/L (ref 135–144)
SPECIMEN DESCRIPTION: NORMAL
SPECIMEN DESCRIPTION: NORMAL
WBC OTHER # BLD: 10.9 K/UL (ref 3.5–11)

## 2024-11-18 PROCEDURE — 94761 N-INVAS EAR/PLS OXIMETRY MLT: CPT

## 2024-11-18 PROCEDURE — 6370000000 HC RX 637 (ALT 250 FOR IP): Performed by: FAMILY MEDICINE

## 2024-11-18 PROCEDURE — 6360000002 HC RX W HCPCS: Performed by: INTERNAL MEDICINE

## 2024-11-18 PROCEDURE — 99232 SBSQ HOSP IP/OBS MODERATE 35: CPT | Performed by: FAMILY MEDICINE

## 2024-11-18 PROCEDURE — 85025 COMPLETE CBC W/AUTO DIFF WBC: CPT

## 2024-11-18 PROCEDURE — 36415 COLL VENOUS BLD VENIPUNCTURE: CPT

## 2024-11-18 PROCEDURE — 80053 COMPREHEN METABOLIC PANEL: CPT

## 2024-11-18 PROCEDURE — 2580000003 HC RX 258: Performed by: FAMILY MEDICINE

## 2024-11-18 PROCEDURE — 3E0L3GC INTRODUCTION OF OTHER THERAPEUTIC SUBSTANCE INTO PLEURAL CAVITY, PERCUTANEOUS APPROACH: ICD-10-PCS | Performed by: INTERNAL MEDICINE

## 2024-11-18 PROCEDURE — 2580000003 HC RX 258: Performed by: INTERNAL MEDICINE

## 2024-11-18 PROCEDURE — 6370000000 HC RX 637 (ALT 250 FOR IP): Performed by: NURSE PRACTITIONER

## 2024-11-18 PROCEDURE — 82947 ASSAY GLUCOSE BLOOD QUANT: CPT

## 2024-11-18 PROCEDURE — 2700000000 HC OXYGEN THERAPY PER DAY

## 2024-11-18 PROCEDURE — 82248 BILIRUBIN DIRECT: CPT

## 2024-11-18 PROCEDURE — 71045 X-RAY EXAM CHEST 1 VIEW: CPT

## 2024-11-18 PROCEDURE — 2060000000 HC ICU INTERMEDIATE R&B

## 2024-11-18 RX ADMIN — ACETAMINOPHEN 650 MG: 325 TABLET ORAL at 07:00

## 2024-11-18 RX ADMIN — MELOXICAM 15 MG: 7.5 TABLET ORAL at 08:24

## 2024-11-18 RX ADMIN — ACETAMINOPHEN 650 MG: 325 TABLET ORAL at 20:06

## 2024-11-18 RX ADMIN — LEVOTHYROXINE SODIUM 100 MCG: 50 TABLET ORAL at 06:57

## 2024-11-18 RX ADMIN — METRONIDAZOLE 500 MG: 500 INJECTION, SOLUTION INTRAVENOUS at 20:09

## 2024-11-18 RX ADMIN — DEXTROMETHORPHAN POLISTIREX 60 MG: 30 SUSPENSION ORAL at 08:47

## 2024-11-18 RX ADMIN — CITALOPRAM HYDROBROMIDE 20 MG: 20 TABLET ORAL at 08:24

## 2024-11-18 RX ADMIN — PANTOPRAZOLE SODIUM 40 MG: 40 TABLET, DELAYED RELEASE ORAL at 06:57

## 2024-11-18 RX ADMIN — SODIUM CHLORIDE, PRESERVATIVE FREE 10 ML: 5 INJECTION INTRAVENOUS at 08:24

## 2024-11-18 RX ADMIN — DEXTROMETHORPHAN POLISTIREX 60 MG: 30 SUSPENSION ORAL at 20:06

## 2024-11-18 RX ADMIN — VALSARTAN 80 MG: 160 TABLET, FILM COATED ORAL at 08:24

## 2024-11-18 RX ADMIN — METRONIDAZOLE 500 MG: 500 INJECTION, SOLUTION INTRAVENOUS at 14:01

## 2024-11-18 RX ADMIN — WATER 5 MG: 1 INJECTION INTRAMUSCULAR; INTRAVENOUS; SUBCUTANEOUS at 16:47

## 2024-11-18 RX ADMIN — ALTEPLASE 10 MG: 2.2 INJECTION, POWDER, LYOPHILIZED, FOR SOLUTION INTRAVENOUS at 16:47

## 2024-11-18 RX ADMIN — AMLODIPINE BESYLATE 5 MG: 5 TABLET ORAL at 08:24

## 2024-11-18 RX ADMIN — METRONIDAZOLE 500 MG: 500 INJECTION, SOLUTION INTRAVENOUS at 04:20

## 2024-11-18 RX ADMIN — TRAZODONE HYDROCHLORIDE 100 MG: 100 TABLET ORAL at 20:06

## 2024-11-18 RX ADMIN — SODIUM CHLORIDE, PRESERVATIVE FREE 10 ML: 5 INJECTION INTRAVENOUS at 20:06

## 2024-11-18 RX ADMIN — CARVEDILOL 6.25 MG: 3.12 TABLET, FILM COATED ORAL at 18:28

## 2024-11-18 RX ADMIN — Medication 1000 MG: at 13:58

## 2024-11-18 RX ADMIN — CARVEDILOL 6.25 MG: 3.12 TABLET, FILM COATED ORAL at 08:24

## 2024-11-18 ASSESSMENT — PAIN DESCRIPTION - FREQUENCY
FREQUENCY: INTERMITTENT
FREQUENCY: INTERMITTENT

## 2024-11-18 ASSESSMENT — PAIN DESCRIPTION - ONSET: ONSET: ON-GOING

## 2024-11-18 ASSESSMENT — PAIN DESCRIPTION - ORIENTATION
ORIENTATION: RIGHT

## 2024-11-18 ASSESSMENT — PAIN DESCRIPTION - LOCATION
LOCATION: RIB CAGE

## 2024-11-18 ASSESSMENT — PAIN SCALES - GENERAL
PAINLEVEL_OUTOF10: 8
PAINLEVEL_OUTOF10: 3
PAINLEVEL_OUTOF10: 4
PAINLEVEL_OUTOF10: 0

## 2024-11-18 ASSESSMENT — PAIN DESCRIPTION - DESCRIPTORS
DESCRIPTORS: ACHING
DESCRIPTORS: ACHING
DESCRIPTORS: STABBING;SHARP

## 2024-11-18 ASSESSMENT — PAIN DESCRIPTION - PAIN TYPE
TYPE: ACUTE PAIN
TYPE: ACUTE PAIN

## 2024-11-18 ASSESSMENT — PAIN - FUNCTIONAL ASSESSMENT: PAIN_FUNCTIONAL_ASSESSMENT: ACTIVITIES ARE NOT PREVENTED

## 2024-11-18 NOTE — PLAN OF CARE
services based on physician order or complex needs related to functional status, cognitive ability or social support system  Taken 11/17/2024 2130  Discharge to home or other facility with appropriate resources:   Identify barriers to discharge with patient and caregiver   Arrange for needed discharge resources and transportation as appropriate   Identify discharge learning needs (meds, wound care, etc)   Refer to discharge planning if patient needs post-hospital services based on physician order or complex needs related to functional status, cognitive ability or social support system     Problem: Pain  Goal: Verbalizes/displays adequate comfort level or baseline comfort level  11/18/2024 0637 by Shanon Cooper, RN  Outcome: Progressing  Flowsheets  Taken 11/18/2024 0400  Verbalizes/displays adequate comfort level or baseline comfort level:   Encourage patient to monitor pain and request assistance   Assess pain using appropriate pain scale   Administer analgesics based on type and severity of pain and evaluate response   Implement non-pharmacological measures as appropriate and evaluate response   Notify Licensed Independent Practitioner if interventions unsuccessful or patient reports new pain  Taken 11/17/2024 2130  Verbalizes/displays adequate comfort level or baseline comfort level:   Encourage patient to monitor pain and request assistance   Assess pain using appropriate pain scale   Administer analgesics based on type and severity of pain and evaluate response   Implement non-pharmacological measures as appropriate and evaluate response   Notify Licensed Independent Practitioner if interventions unsuccessful or patient reports new pain     Problem: Respiratory - Adult  Goal: Achieves optimal ventilation and oxygenation  11/18/2024 0637 by Shanon Cooper, RN  Outcome: Progressing  Flowsheets  Taken 11/18/2024 0400  Achieves optimal ventilation and oxygenation:   Assess for changes in respiratory status   Assess for  stability: Monitor blood pressure and heart rate  Taken 11/17/2024 2130  Maintains optimal cardiac output and hemodynamic stability:   Monitor blood pressure and heart rate   Monitor urine output and notify Licensed Independent Practitioner for values outside of normal range   Administer vasoactive medications as ordered     Problem: Skin/Tissue Integrity - Adult  Goal: Skin integrity remains intact  11/18/2024 0637 by Shanon Cooper RN  Outcome: Progressing  Flowsheets  Taken 11/18/2024 0400  Skin Integrity Remains Intact: Monitor for areas of redness and/or skin breakdown  Taken 11/18/2024 0015  Skin Integrity Remains Intact: Monitor for areas of redness and/or skin breakdown  Taken 11/17/2024 2234  Skin Integrity Remains Intact: Monitor for areas of redness and/or skin breakdown  Taken 11/17/2024 2130  Skin Integrity Remains Intact: Monitor for areas of redness and/or skin breakdown     Problem: Musculoskeletal - Adult  Goal: Return mobility to safest level of function  11/18/2024 0637 by Shanon Cooper RN  Outcome: Progressing  Flowsheets  Taken 11/18/2024 0400  Return Mobility to Safest Level of Function: Assess patient stability and activity tolerance for standing, transferring and ambulating with or without assistive devices  Taken 11/18/2024 0015  Return Mobility to Safest Level of Function: Assess patient stability and activity tolerance for standing, transferring and ambulating with or without assistive devices  Taken 11/17/2024 2130  Return Mobility to Safest Level of Function:   Assess patient stability and activity tolerance for standing, transferring and ambulating with or without assistive devices   Assist with transfers and ambulation using safe patient handling equipment as needed   Ensure adequate protection for wounds/incisions during mobilization     Problem: Musculoskeletal - Adult  Goal: Maintain proper alignment of affected body part  11/18/2024 0637 by Shanon Cooper, RN  Outcome:

## 2024-11-18 NOTE — PROGRESS NOTES
Pulmonary Progress Note  [facility]      Patient - Clary Harris,  Age - 79 y.o.    - 1945      Room Number - 343/343-01   N -  [id number]   [facility] # - [id number]  Date of Admission -  2024  9:06 PM        Consulting Service/Physician   Consulting - Luis Galvan MD  Primary Care Physician - Kandi Thompson,      SUBJECTIVE   Looks comfortable sitting in chair; one of her chest tubes fell out this morning    OBJECTIVE   VITALS    height is 1.651 m (5' 5\") and weight is 83.9 kg (185 lb). Her oral temperature is 99 °F (37.2 °C). Her blood pressure is 109/79 and her pulse is 63. Her respiration is 20 and oxygen saturation is 95%.     Body mass index is 30.79 kg/m².  Temperature Range: Temp: 99 °F (37.2 °C) Temp  Av °F (37.2 °C)  Min: 98.6 °F (37 °C)  Max: 99.5 °F (37.5 °C)  BP Range:  Systolic (24hrs), Av , Min:104 , Max:127     Diastolic (24hrs), Av, Min:62, Max:85    Pulse Range: Pulse  Av.2  Min: 63  Max: 77  Respiration Range: Resp  Av.4  Min: 16  Max: 23  Current Pulse Ox::  SpO2: 95 %  24HR Pulse Ox Range:  SpO2  Av.6 %  Min: 86 %  Max: 96 %  Oxygen Amount and Delivery: O2 Flow Rate (L/min): 3 L/min    Wt Readings from Last 3 Encounters:   24 83.9 kg (185 lb)   06/10/24 87.7 kg (193 lb 6.4 oz)   23 88.9 kg (196 lb)       I/O (24 Hours)    Intake/Output Summary (Last 24 hours) at 2024 1515  Last data filed at 2024 0708  Gross per 24 hour   Intake --   Output 92 ml   Net -92 ml       EXAM     General Appearance  Awake, alert, oriented, in no acute distress  HEENT - normocephalic, atraumatic. []  Mallampati  [] Crowded airway   [] Macroglossia  []  Retrognathia  [] Micrognathia  []  Normal tongue size []  Normal Bite  [] Fan sign positive    Neck - Supple,  trachea midline   Lungs -diminished no wheezes or rhonchi  Cardiovascular - Heart sounds are normal.  Regular rate

## 2024-11-18 NOTE — CARE COORDINATION
[facility] Quality Flow/Interdisciplinary Rounds Progress Note    Quality Flow Rounds held on November 18, 2024 at 0930    Disciplines Attending:  Bedside Nurse, , and Nursing Unit Leadership    Barriers to Discharge: Clinical status    Anticipated Discharge Date:   11/20/24    Anticipated Discharge Disposition: Home w/ HHC    Readmission Risk              Risk of Unplanned Readmission:  12           Discussed patient goal for the day, patient clinical progression, and barriers to discharge. RN reports that patient's #3 chest tube fell out yesterday evening.  Await Pulmonology rounding for plan. Patient still on IV rocephin and flagyl. Patient's goal is to discharge home w/ HHC. Accepted by [address]. Patient still on 3L NC and will need home O2 eval prior to discharge.    1500- ANA MARIA spoke with Sharon from [facility] and she states that she is going to evaluate patient again for LTACH admission.    1530- Patient previously accepted to [facility]. ANA MARIA called and spoke with Jackie from [facility] and she is going to verify if they are able to accept patient with a chest tube.    1555- Call received from Sharon with [facility] stating that patient doesn't meet LTACH criteria. ANA MARIA spoke with Jackie from [facility] and she confirmed they are able to accept patient with chest tube as long as it doesn't require suction.

## 2024-11-18 NOTE — PROCEDURES
PROCEDURE NOTE  Date: 11/18/2024   Name: Clary Harris  YOB: 1945          Procedures:     Instillation of alteplase followed by dornase alpha on pleural cathether     Indication  Persistent pockets of fluid.  Purpose is to see if we can provide enzymatic degradation of the pockets of fluid to remove more fluid if possible        Post diagnosis:     Same     Patient was placed in the left lateral decubitus position.  We were able to expose without difficulty the three-way stopcock.  We did use ChloraPrep/to clean the entrance of one of the three-way stopcock's for instillation of the alteplase and dornase alpha     We were able to place alteplase 10 mg in 0.9 normal saline 30 mL into the catheter without difficulty.  This was flushed with saline.  This is followed by instillation of 30 mL of 5 mg of dornase alpha.      Patient tolerated procedure with no complications     Estimated blood loss 0     The catheter will be clamped t and then after 90 minutes catheter will be placed to wall suction

## 2024-11-18 NOTE — PROGRESS NOTES
Occupational Therapy    [facility]  Occupational Therapy Not Seen Note    DATE: 2024    NAME: Clary Harris  MRN: [id number]   : 1945      Patient not seen this date for Occupational Therapy due to:    Other: RN deferred therapy this AM; pt with accidental chest tube removal previous PM; awaiting plan. Will continue for OT tx as appropriate.     Next Scheduled Treatment:  PM or 24    Electronically signed by Kendell Wilcox OT on 2024 at 9:39 AM

## 2024-11-18 NOTE — PROGRESS NOTES
Physical Therapy        Physical Therapy Cancel Note      DATE: 2024    NAME: Clary Harris  MRN: [id number]   : 1945      Patient not seen this date for Physical Therapy due to:    Lula PÉREZ, deferred PT until after pulmonology determines POC for recent chest tube accidental removal. Pulmonology has not seen pt at this time. Will continue to pursue PT treat as appropriate.       Electronically signed by FLYNN PARHAM, PT on 2024 at 3:43 PM

## 2024-11-18 NOTE — PROGRESS NOTES
[facility] Medicine  IN-PATIENT SERVICE   [facility] - Location: [place]    Progress Note    11/18/2024    10:29 AM    Name:   Clary Harris  MRN:     [id number]     Acct:      [id number]   Room:   [address] [place] [address]-[facility] Day:  16  Admit Date:  11/2/2024  9:06 PM    PCP:   Kandi Thompson DO  Code Status:  Full Code    Subjective:   Patient remained stable.  Apparently last evening when she was getting up to the bathroom her lower chest tube fell out without complications.  Follow-up chest x-ray showed no pneumothorax.  Pigtail catheter is in place and is draining without difficulty.  Patient clinically feels fine and did not notice any change in her pulmonary condition.  This morning's white count is 11,000 and SaO2 is 96% on 3 L.    Medications:     Allergies:    Allergies   Allergen Reactions    Hydrochlorothiazide     Sulfa Antibiotics Hives, Other (See Comments) and Itching       Current Meds:   Scheduled Meds:    cefTRIAXone (ROCEPHIN) IV  1,000 mg IntraVENous Q24H    metroNIDAZOLE  500 mg IntraVENous Q8H    insulin lispro  3-15 Units SubCUTAneous TID WC    Budeson-Glycopyrrol-Formoterol  2 puff Inhalation BID    levothyroxine  100 mcg Oral Daily    meloxicam  15 mg Oral Daily    traZODone  100 mg Oral Nightly    dextromethorphan  60 mg Oral 2 times per day    sodium chloride  80 mL IntraVENous Once    sodium chloride flush  5-40 mL IntraVENous 2 times per day    enoxaparin  40 mg SubCUTAneous Daily    amLODIPine  5 mg Oral Daily    valsartan  80 mg Oral Daily    carvedilol  6.25 mg Oral BID WC    citalopram  20 mg Oral Daily    pantoprazole  40 mg Oral QAM AC     Continuous Infusions:    dextrose      sodium chloride 10 mL/hr at 11/16/24 1228     PRN Meds: glucose, dextrose bolus **OR** dextrose bolus, glucagon (rDNA), dextrose, albuterol, benzonatate, sodium chloride flush, sodium chloride, potassium chloride **OR** potassium alternative oral replacement **OR** potassium chloride, magnesium

## 2024-11-19 ENCOUNTER — APPOINTMENT (OUTPATIENT)
Dept: GENERAL RADIOLOGY | Age: 79
DRG: 193 | End: 2024-11-19
Payer: MEDICARE

## 2024-11-19 LAB
GLUCOSE BLD-MCNC: 122 MG/DL (ref 65–105)
GLUCOSE BLD-MCNC: 150 MG/DL (ref 65–105)
GLUCOSE BLD-MCNC: 152 MG/DL (ref 65–105)
GLUCOSE BLD-MCNC: 155 MG/DL (ref 65–105)
MICROORGANISM SPEC CULT: NORMAL
MICROORGANISM/AGENT SPEC: NORMAL
SPECIMEN DESCRIPTION: NORMAL

## 2024-11-19 PROCEDURE — 71045 X-RAY EXAM CHEST 1 VIEW: CPT

## 2024-11-19 PROCEDURE — 99232 SBSQ HOSP IP/OBS MODERATE 35: CPT | Performed by: FAMILY MEDICINE

## 2024-11-19 PROCEDURE — 97110 THERAPEUTIC EXERCISES: CPT

## 2024-11-19 PROCEDURE — 94761 N-INVAS EAR/PLS OXIMETRY MLT: CPT

## 2024-11-19 PROCEDURE — 2580000003 HC RX 258: Performed by: FAMILY MEDICINE

## 2024-11-19 PROCEDURE — 6370000000 HC RX 637 (ALT 250 FOR IP): Performed by: FAMILY MEDICINE

## 2024-11-19 PROCEDURE — 6360000002 HC RX W HCPCS: Performed by: FAMILY MEDICINE

## 2024-11-19 PROCEDURE — 2060000000 HC ICU INTERMEDIATE R&B

## 2024-11-19 PROCEDURE — 97116 GAIT TRAINING THERAPY: CPT

## 2024-11-19 PROCEDURE — 97535 SELF CARE MNGMENT TRAINING: CPT

## 2024-11-19 PROCEDURE — 82947 ASSAY GLUCOSE BLOOD QUANT: CPT

## 2024-11-19 PROCEDURE — 6370000000 HC RX 637 (ALT 250 FOR IP): Performed by: NURSE PRACTITIONER

## 2024-11-19 PROCEDURE — 2700000000 HC OXYGEN THERAPY PER DAY

## 2024-11-19 PROCEDURE — 6360000002 HC RX W HCPCS: Performed by: INTERNAL MEDICINE

## 2024-11-19 RX ADMIN — PANTOPRAZOLE SODIUM 40 MG: 40 TABLET, DELAYED RELEASE ORAL at 05:12

## 2024-11-19 RX ADMIN — MELOXICAM 15 MG: 7.5 TABLET ORAL at 08:37

## 2024-11-19 RX ADMIN — DEXTROMETHORPHAN POLISTIREX 60 MG: 30 SUSPENSION ORAL at 20:05

## 2024-11-19 RX ADMIN — CARVEDILOL 6.25 MG: 3.12 TABLET, FILM COATED ORAL at 08:36

## 2024-11-19 RX ADMIN — SODIUM CHLORIDE, PRESERVATIVE FREE 10 ML: 5 INJECTION INTRAVENOUS at 20:00

## 2024-11-19 RX ADMIN — TRAZODONE HYDROCHLORIDE 100 MG: 100 TABLET ORAL at 20:01

## 2024-11-19 RX ADMIN — LEVOTHYROXINE SODIUM 100 MCG: 50 TABLET ORAL at 05:12

## 2024-11-19 RX ADMIN — METRONIDAZOLE 500 MG: 500 INJECTION, SOLUTION INTRAVENOUS at 11:58

## 2024-11-19 RX ADMIN — DEXTROMETHORPHAN POLISTIREX 60 MG: 30 SUSPENSION ORAL at 09:54

## 2024-11-19 RX ADMIN — METRONIDAZOLE 500 MG: 500 INJECTION, SOLUTION INTRAVENOUS at 05:27

## 2024-11-19 RX ADMIN — CARVEDILOL 6.25 MG: 3.12 TABLET, FILM COATED ORAL at 18:09

## 2024-11-19 RX ADMIN — VALSARTAN 80 MG: 160 TABLET, FILM COATED ORAL at 08:37

## 2024-11-19 RX ADMIN — METRONIDAZOLE 500 MG: 500 INJECTION, SOLUTION INTRAVENOUS at 20:03

## 2024-11-19 RX ADMIN — ACETAMINOPHEN 650 MG: 325 TABLET ORAL at 20:00

## 2024-11-19 RX ADMIN — SODIUM CHLORIDE, PRESERVATIVE FREE 10 ML: 5 INJECTION INTRAVENOUS at 08:41

## 2024-11-19 RX ADMIN — CITALOPRAM HYDROBROMIDE 20 MG: 20 TABLET ORAL at 08:37

## 2024-11-19 RX ADMIN — AMLODIPINE BESYLATE 5 MG: 5 TABLET ORAL at 08:37

## 2024-11-19 RX ADMIN — ENOXAPARIN SODIUM 40 MG: 100 INJECTION SUBCUTANEOUS at 08:38

## 2024-11-19 RX ADMIN — Medication 1000 MG: at 11:44

## 2024-11-19 ASSESSMENT — PAIN DESCRIPTION - LOCATION: LOCATION: RIB CAGE

## 2024-11-19 ASSESSMENT — PAIN SCALES - GENERAL
PAINLEVEL_OUTOF10: 0
PAINLEVEL_OUTOF10: 3

## 2024-11-19 ASSESSMENT — PAIN DESCRIPTION - DESCRIPTORS: DESCRIPTORS: ACHING

## 2024-11-19 ASSESSMENT — PAIN DESCRIPTION - ORIENTATION: ORIENTATION: RIGHT

## 2024-11-19 NOTE — PLAN OF CARE
Problem: Chronic Conditions and Co-morbidities  Goal: Patient's chronic conditions and co-morbidity symptoms are monitored and maintained or improved  11/18/2024 2020 by Jackie Vidal, RN  Outcome: Progressing  Flowsheets  Taken 11/18/2024 1106 by Lula Rayo RN  Care Plan - Patient's Chronic Conditions and Co-Morbidity Symptoms are Monitored and Maintained or Improved:   Monitor and assess patient's chronic conditions and comorbid symptoms for stability, deterioration, or improvement   Collaborate with multidisciplinary team to address chronic and comorbid conditions and prevent exacerbation or deterioration   Update acute care plan with appropriate goals if chronic or comorbid symptoms are exacerbated and prevent overall improvement and discharge  Taken 11/18/2024 0713 by Lula Rayo RN  Care Plan - Patient's Chronic Conditions and Co-Morbidity Symptoms are Monitored and Maintained or Improved:   Monitor and assess patient's chronic conditions and comorbid symptoms for stability, deterioration, or improvement   Collaborate with multidisciplinary team to address chronic and comorbid conditions and prevent exacerbation or deterioration   Update acute care plan with appropriate goals if chronic or comorbid symptoms are exacerbated and prevent overall improvement and discharge  11/18/2024 0637 by Shanon Cooper RN  Outcome: Progressing  Flowsheets  Taken 11/18/2024 0400  Care Plan - Patient's Chronic Conditions and Co-Morbidity Symptoms are Monitored and Maintained or Improved: Monitor and assess patient's chronic conditions and comorbid symptoms for stability, deterioration, or improvement  Taken 11/18/2024 0015  Care Plan - Patient's Chronic Conditions and Co-Morbidity Symptoms are Monitored and Maintained or Improved: Monitor and assess patient's chronic conditions and comorbid symptoms for stability, deterioration, or improvement  Taken 11/17/2024 2130  Care Plan - Patient's Chronic Conditions and  breakdown  2.  Assess vascular access sites hourly  3.  Every 4-6 hours minimum:  Change oxygen saturation probe site  4.  Every 4-6 hours:  If on nasal continuous positive airway pressure, respiratory therapy assess nares and determine need for appliance change or resting period.  11/18/2024 2020 by Jackie Vidal, RN  Outcome: Progressing  Note: Skin assessed each shift  11/18/2024 0637 by Shanon Cooper, RN  Outcome: Progressing

## 2024-11-19 NOTE — PROGRESS NOTES
Pulmonary Progress Note  [facility]      Patient - Clary Harris,  Age - 79 y.o.    - 1945      Room Number - 343/343-01   N -  [id number]   [facility] # - [id number]  Date of Admission -  2024  9:06 PM        Consulting Service/Physician   Consulting - Luis Galvan MD  Primary Care Physician - Kandi Thompson DO SUBJECTIVE   Pigtail catheter drained about 97 mL overnight after the instillation of the tPA dornase.  X-ray is not significantly improved or worsening.  She had previously gotten 3 courses of tPA dornase so with additional 2, I do not think there is any need to do any more    OBJECTIVE   VITALS    height is 1.651 m (5' 5\") and weight is 83.9 kg (185 lb). Her oral temperature is 98.1 °F (36.7 °C). Her blood pressure is 122/79 and her pulse is 69. Her respiration is 17 and oxygen saturation is 94%.     Body mass index is 30.79 kg/m².  Temperature Range: Temp: 98.1 °F (36.7 °C) Temp  Av.9 °F (37.2 °C)  Min: 98 °F (36.7 °C)  Max: 99.6 °F (37.6 °C)  BP Range:  Systolic (24hrs), Av , Min:92 , Max:125     Diastolic (24hrs), Av, Min:57, Max:79    Pulse Range: Pulse  Av  Min: 69  Max: 92  Respiration Range: Resp  Av.8  Min: 17  Max: 27  Current Pulse Ox::  SpO2: 94 %  24HR Pulse Ox Range:  SpO2  Av.6 %  Min: 93 %  Max: 96 %  Oxygen Amount and Delivery: O2 Flow Rate (L/min): 1 L/min    Wt Readings from Last 3 Encounters:   24 83.9 kg (185 lb)   06/10/24 87.7 kg (193 lb 6.4 oz)   23 88.9 kg (196 lb)       I/O (24 Hours)    Intake/Output Summary (Last 24 hours) at 2024 1410  Last data filed at 2024 2746  Gross per 24 hour   Intake 500 ml   Output 170 ml   Net 330 ml       EXAM     General Appearance  Awake, alert, oriented, in no acute distress  HEENT - normocephalic, atraumatic. []  Mallampati  [] Crowded airway   [] Macroglossia  []  Retrognathia  [] Micrognathia  []  Normal

## 2024-11-19 NOTE — PROGRESS NOTES
Spiritual Health History and Assessment/Progress Note  [facility]    (P) Follow-up, Spiritual/Emotional Needs, (P) Follow up, (P) Life Adjustments, Adjustment to illness,      Name: Clary Harris MRN: [id number]    Age: 79 y.o.     Sex: female   Language: English   Mu-ism: Shinto   Pneumonia due to infectious agent     Date: 11/18/2024            Total Time Calculated: (P) 15 min              Spiritual Assessment continued in [address]        Referral/Consult From: (P) Rounding   Encounter Overview/Reason: (P) Follow-up, Spiritual/Emotional Needs  Service Provided For: (P) Patient       followed up on Pt. Pt was awake and alert. Pt was welcoming and open to conversation about family and paula.  offered support and pastoral care. Provided active listening and encouragement and hope.     Paula, Belief, Meaning:   Patient has beliefs or practices that help with coping during difficult times  Family/Friends No family/friends present      Importance and Influence:  Patient has spiritual/personal beliefs that influence decisions regarding their health  Family/Friends No family/friends present    Community:  Patient feels well-supported. Support system includes: Children  Family/Friends No family/friends present    Assessment and Plan of Care:     Patient Interventions include: Facilitated expression of thoughts and feelings and Explored spiritual coping/struggle/distress  Family/Friends Interventions include: No family/friends present    Patient Plan of Care: Spiritual Care available upon further referral  Family/Friends Plan of Care: No family/friends present    Electronically signed by Chaplain SVETLANA on 11/18/2024 at 8:00 PM    11/18/24 1957   Encounter Summary   Encounter Overview/Reason Follow-up;Spiritual/Emotional Needs   Service Provided For Patient   Referral/Consult From [facility]   Support System Children;Spouse   Last Encounter  11/18/24   Complexity of

## 2024-11-19 NOTE — PROGRESS NOTES
Occupational Therapy Daily Treatment Note  Facility/Department: [address]   Patient Name: Clary Harris        MRN: [id number]    : 1945    Date of Service: 2024    Discharge Recommendations  Discharge Recommendations: Patient would benefit from continued therapy after discharge       Chief Complaint   Patient presents with    Shortness of Breath    Chest Pain     Onset Wednesday    Chills     Past Medical History:  has a past medical history of COPD (chronic obstructive pulmonary disease) (HCC), Diverticulosis, DJD (degenerative joint disease), Hypertension, Hypertriglyceridemia, Hypothyroidism, Mild valvular heart disease, Osteoarthritis, Sleep apnea, Type 2 diabetes mellitus without complication (HCC), and Vertigo.  Past Surgical History:  has a past surgical history that includes Tubal ligation; Cholecystectomy; Colonoscopy; Breast biopsy; IR CHEST TUBE INSERTION (2024); and CT GUIDED CHEST TUBE (2024).    Assessment  Performance deficits / Impairments: Decreased functional mobility ;Decreased endurance;Decreased balance;Decreased ADL status  Assessment: Pt tolerated session well. Pt completed ~40ft ADL mobility with use of FWW with grossly supervision, no overt LOB or instability noted. Pt grossly Mod I with toilet transfers with grab bar use. Able to complete toileting with Mod I and LB dressing with supervision. Pt progressing well with therapy, continues to benefit from OT to further address impairments and to ensure optimal safety and independence.  Prognosis: Good  REQUIRES OT FOLLOW-UP: Yes  Activity Tolerance  Activity Tolerance: Patient Tolerated treatment well  Safety Devices  Type of Devices: Call light within reach;Left in chair;Nurse notified (Pt left as received up in recliner chair.)  Restraints  Restraints Initially in Place: No    Restrictions/Precautions  Restrictions/Precautions  Restrictions/Precautions: Fall Risk  Required Braces or Orthoses?: No  Position  completion.  Education Method: Verbal  Barriers to Learning: None  Education Outcome: Verbalized understanding;Continued education needed    Goals  Short Term Goals  Time Frame for Short Term Goals: 14 visits  Short Term Goal 1: Pt to complete all ADL transfers at MOD I with LRAD and good safety awareness  Short Term Goal 2: Pt to complete LB ADLs/toileting at SUP with no vc's for safety  Short Term Goal 3: Pt to complete UB ADLs at MOD I  Short Term Goal 4: Pt to complete standing ADL for >12 mins at SUP with no LOB and no rest break    Plan  Occupational Therapy Plan  Times Per Week: 3-5x/wk  Times Per Day: Once a day  Current Treatment Recommendations: Strengthening, Balance training, Functional mobility training, Endurance training, Safety education & training, Patient/Caregiver education & training, Equipment evaluation, education, & procurement, Self-Care / ADL, Coordination training    AM-[facility] Daily Activities Inpatient  AM-PAC Daily Activity - Inpatient   How much help is needed for putting on and taking off regular lower body clothing?: A Little  How much help is needed for bathing (which includes washing, rinsing, drying)?: A Little  How much help is needed for toileting (which includes using toilet, bedpan, or urinal)?: None  How much help is needed for putting on and taking off regular upper body clothing?: None  How much help is needed for taking care of personal grooming?: A Little  How much help for eating meals?: None  AM-PAC Inpatient Daily Activity Raw Score: 21  AM-PAC Inpatient ADL T-Scale Score : 44.27  ADL Inpatient CMS 0-100% Score: 32.79  ADL Inpatient CMS G-Code Modifier : CJ    Minutes  OT Individual Minutes  Time In: 1437  Time Out: 1452  Minutes: 15  Time Code Minutes   Timed Code Treatment Minutes: 15 Minutes    Electronically signed by ELAINE HOWELL OT on 11/19/24 at 3:16 PM EST

## 2024-11-19 NOTE — PROGRESS NOTES
Physical Therapy  Facility/Department: [address]  Physical Therapy Daily Treatment Note    Name: Clary Harris  : 1945  MRN: [id number]  Date of Service: 2024    Discharge Recommendations:  No therapy recommended at discharge   PT Equipment Recommendations  Equipment Needed: No (has rolling walker at home)      Patient Diagnosis(es): The primary encounter diagnosis was Chest pain, unspecified type. Diagnoses of Dyspnea, unspecified type, Hypoxia, Pneumonia of both lower lobes due to infectious organism, and Chronic obstructive pulmonary disease, unspecified COPD type (HCC) were also pertinent to this visit.  Past Medical History:  has a past medical history of COPD (chronic obstructive pulmonary disease) (HCC), Diverticulosis, DJD (degenerative joint disease), Hypertension, Hypertriglyceridemia, Hypothyroidism, Mild valvular heart disease, Osteoarthritis, Sleep apnea, Type 2 diabetes mellitus without complication (HCC), and Vertigo.  Past Surgical History:  has a past surgical history that includes Tubal ligation; Cholecystectomy; Colonoscopy; Breast biopsy; IR CHEST TUBE INSERTION (2024); and CT GUIDED CHEST TUBE (2024).    Assessment  Body Structures, Functions, Activity Limitations Requiring Skilled Therapeutic Intervention: Decreased functional mobility ;Decreased safe awareness;Decreased endurance    Assessment: Pt presents with community acquired pneumonia and currently has a chest tube. At baseline, pt lives alone and was independent gait no device, ind ADL's and all IADL's including yard work. Pt currently demonstrating modified independence with bed mobility, independence with transfers, pt ambulated 160ft with rolling walker and supervision. Pt on 2L O2 througout PT session with lowest saturation at 87% and pt demonstrating good breathing technique to increase over 90% within 1 minute. Pt reported improved endurance with walker use. Pt demonstrates adequate safety for return to      Education  Patient Education  Education Given To: Patient  Education Provided: Energy Conservation;Home Exercise Program  Education Provided Comments: mantaining of hip ROM, posture, use of rolling walker to assist with endurance  Education Method: Verbal;Demonstration  Barriers to Learning: None  Education Outcome: Verbalized understanding;Demonstrated understanding;Continued education needed      Therapy Time   Individual Concurrent Group Co-treatment   Time In 1052         Time Out 1127         Minutes 35         Timed Code Treatment Minutes: 28 Minutes       FLYNN PARHAM, PT

## 2024-11-19 NOTE — PROGRESS NOTES
acetaminophen **OR** acetaminophen    Data:     Vitals:  /79   Pulse 69   Temp 98.1 °F (36.7 °C) (Oral)   Resp 17   Ht 1.651 m (5' 5\")   Wt 83.9 kg (185 lb)   SpO2 94%   BMI 30.79 kg/m²   Temp (24hrs), Av.9 °F (37.2 °C), Min:98 °F (36.7 °C), Max:99.6 °F (37.6 °C)    Recent Labs     24  1110 24  16124  0838   POCGLU 126* 145* 192* 122*       I/O (24Hr):    Intake/Output Summary (Last 24 hours) at 2024 111  Last data filed at 2024 0527  Gross per 24 hour   Intake 500 ml   Output 170 ml   Net 330 ml       Labs:  Hematology:  Recent Labs     24  0642 24  1011   WBC 7.4 10.9   RBC 3.89* 4.10   HGB 11.1* 11.7*   HCT 34.2* 36.2   MCV 88.0 88.4   MCH 28.6 28.5   MCHC 32.5 32.3   RDW 15.8* 15.8*   * 485*   MPV 7.3 7.1     Chemistry:  Recent Labs     24  0642 24  1011    139   K 4.1 4.2    103   CO2 32* 29   GLUCOSE 106* 144*   BUN 16 14   CREATININE 0.6 0.6   ANIONGAP 4* 7*   LABGLOM >90 >90   CALCIUM 9.0 8.7     Recent Labs     24  2101 24  0753 24  1011 24  1110 24  16124  0838   AST  --   --  11  --   --   --   --    ALT  --   --  14  --   --   --   --    ALKPHOS  --   --  80  --   --   --   --    BILITOT  --   --  0.3  --   --   --   --    BILIDIR  --   --  0.1  --   --   --   --    POCGLU 212* 126*  --  126* 145* 192* 122*     ABG:No results found for: \"POCPH\", \"PHART\", \"PH\", \"POCPCO2\", \"TJV9PUT\", \"PCO2\", \"POCPO2\", \"PO2ART\", \"PO2\", \"POCHCO3\", \"UXU5LTH\", \"HCO3\", \"NBEA\", \"PBEA\", \"BEART\", \"BE\", \"THGBART\", \"THB\", \"QBA4BSY\", \"WMAF4AEY\", \"Y1NFAPFW\", \"O2SAT\", \"FIO2\"  Lab Results   Component Value Date/Time    SPECIAL LEFT HAND 10ML 2024 10:35 PM     Lab Results   Component Value Date/Time    CULTURE NO GROWTH 5 DAYS 2024 04:00 PM    CULTURE NO GROWTH 6 DAYS 2024 04:00 PM    CULTURE NO GROWTH 5 DAYS 2024 04:00 PM       Radiology:  XR CHEST  PORTABLE    Result Date: 11/19/2024  No significant change. Right basilar chest tube with loculated right pleural effusion.     XR CHEST PORTABLE    Result Date: 11/18/2024  Stable small right pleural effusion and basilar atelectasis. New small left pleural effusion.     XR CHEST PORTABLE    Result Date: 11/17/2024  Negative for pneumothorax.     XR CHEST PORTABLE    Result Date: 11/17/2024  1. 2 small bore right-sided chest tubes are noted terminating at the right lung base, unchanged. 2. Strand-like opacity at the right base with minimal improvement compared to prior study.     CT CHEST WO CONTRAST    Result Date: 11/16/2024  1. Indwelling pigtail terminus chest tube terminating in the dorsal aspect of the mid right lower lobe. 2. Patchy of consolidation in the superior aspect of the right lower lobe is noted.  Some improvement noted.  Thick bands of atelectasis are noted at the lung bases on the right. Mild consolidation inferior aspect of the right middle lobe with air bronchograms noted, slightly improved.  Prior ground-glass type infiltrates in the left upper lobe have resolved. 3. Minimal free pleural fluid on the right. 4. Mild mediastinal adenopathy. 5. Calcified coronary arteries. 6. Atherosclerotic disease of the aorta and major branches. 7. Cholecystectomy. 8. Multilevel degenerative changes in the spine.     XR CHEST PORTABLE    Result Date: 11/15/2024  Unchanged right pigtail catheters and moderate right pleural effusion.  No pneumothorax.     XR CHEST PORTABLE    Result Date: 11/14/2024  No evidence of pneumothorax status post placement of chest tubes to water seal.     XR CHEST PORTABLE    Result Date: 11/14/2024  Right pigtail drainage catheters remain in place.  Increasing opacification of the right base most suggestive of increasing effusion and subsegmental atelectasis.     XR CHEST PORTABLE    Result Date: 11/13/2024  Right-sided chest tubes are present. No evidence of pneumothorax.  Small to

## 2024-11-19 NOTE — CARE COORDINATION
[facility] Quality Flow/Interdisciplinary Rounds Progress Note    Quality Flow Rounds held on November 19, 2024 at 0930    Disciplines Attending:  Bedside Nurse, , and Nursing Unit Leadership    Barriers to Discharge: Clinical status    Anticipated Discharge Date:   11/20/24    Anticipated Discharge Disposition: LTACH vs. SNF vs. Home w/ HHC    Readmission Risk              Risk of Unplanned Readmission:  12           Discussed patient goal for the day, patient clinical progression, and barriers to discharge.  RN reports that patient's chest tube is back to suction. [facility] unable to accept with chest tube to suction. CM called and spoke with Sharon from [facility] and gave update. Sharon  states that she will review referral with her  for acceptance.    [address] [place] [address] spoke with Sharon from [facility] and they are unable to accept patient without assurance that she will need 25 LTACH days.

## 2024-11-20 ENCOUNTER — APPOINTMENT (OUTPATIENT)
Dept: GENERAL RADIOLOGY | Age: 79
DRG: 193 | End: 2024-11-20
Payer: MEDICARE

## 2024-11-20 LAB
ALBUMIN SERPL-MCNC: 2.9 G/DL (ref 3.5–5.2)
ALBUMIN/GLOB SERPL: 0.9 {RATIO} (ref 1–2.5)
ALP SERPL-CCNC: 75 U/L (ref 35–104)
ALT SERPL-CCNC: 10 U/L (ref 5–33)
ANION GAP SERPL CALCULATED.3IONS-SCNC: 6 MMOL/L (ref 9–17)
AST SERPL-CCNC: 11 U/L
BASOPHILS # BLD: 0 K/UL (ref 0–0.2)
BASOPHILS NFR BLD: 1 % (ref 0–2)
BILIRUB DIRECT SERPL-MCNC: 0.1 MG/DL
BILIRUB INDIRECT SERPL-MCNC: 0.1 MG/DL (ref 0–1)
BILIRUB SERPL-MCNC: 0.2 MG/DL (ref 0.3–1.2)
BUN SERPL-MCNC: 11 MG/DL (ref 8–23)
CALCIUM SERPL-MCNC: 8.7 MG/DL (ref 8.6–10.4)
CHLORIDE SERPL-SCNC: 104 MMOL/L (ref 98–107)
CO2 SERPL-SCNC: 30 MMOL/L (ref 20–31)
CREAT SERPL-MCNC: 0.6 MG/DL (ref 0.5–0.9)
EOSINOPHIL # BLD: 0.1 K/UL (ref 0–0.4)
EOSINOPHILS RELATIVE PERCENT: 2 % (ref 1–4)
ERYTHROCYTE [DISTWIDTH] IN BLOOD BY AUTOMATED COUNT: 16.1 % (ref 12.5–15.4)
GFR, ESTIMATED: >90 ML/MIN/1.73M2
GLUCOSE BLD-MCNC: 113 MG/DL (ref 65–105)
GLUCOSE BLD-MCNC: 119 MG/DL (ref 65–105)
GLUCOSE BLD-MCNC: 154 MG/DL (ref 65–105)
GLUCOSE BLD-MCNC: 176 MG/DL (ref 65–105)
GLUCOSE SERPL-MCNC: 105 MG/DL (ref 70–99)
HCT VFR BLD AUTO: 32.6 % (ref 36–46)
HGB BLD-MCNC: 10.5 G/DL (ref 12–16)
LYMPHOCYTES NFR BLD: 1.1 K/UL (ref 1–4.8)
LYMPHOCYTES RELATIVE PERCENT: 18 % (ref 24–44)
MCH RBC QN AUTO: 28.2 PG (ref 26–34)
MCHC RBC AUTO-ENTMCNC: 32.2 G/DL (ref 31–37)
MCV RBC AUTO: 87.6 FL (ref 80–100)
MONOCYTES NFR BLD: 0.7 K/UL (ref 0.1–1.2)
MONOCYTES NFR BLD: 11 % (ref 2–11)
NEUTROPHILS NFR BLD: 68 % (ref 36–66)
NEUTS SEG NFR BLD: 4.3 K/UL (ref 1.8–7.7)
PLATELET # BLD AUTO: 481 K/UL (ref 140–450)
PMV BLD AUTO: 7 FL (ref 6–12)
POTASSIUM SERPL-SCNC: 4 MMOL/L (ref 3.7–5.3)
PROT SERPL-MCNC: 6.3 G/DL (ref 6.4–8.3)
RBC # BLD AUTO: 3.72 M/UL (ref 4–5.2)
SODIUM SERPL-SCNC: 140 MMOL/L (ref 135–144)
WBC OTHER # BLD: 6.3 K/UL (ref 3.5–11)

## 2024-11-20 PROCEDURE — 2060000000 HC ICU INTERMEDIATE R&B

## 2024-11-20 PROCEDURE — 2580000003 HC RX 258: Performed by: FAMILY MEDICINE

## 2024-11-20 PROCEDURE — 6360000002 HC RX W HCPCS: Performed by: INTERNAL MEDICINE

## 2024-11-20 PROCEDURE — 80053 COMPREHEN METABOLIC PANEL: CPT

## 2024-11-20 PROCEDURE — 6370000000 HC RX 637 (ALT 250 FOR IP): Performed by: NURSE PRACTITIONER

## 2024-11-20 PROCEDURE — 97116 GAIT TRAINING THERAPY: CPT

## 2024-11-20 PROCEDURE — 71045 X-RAY EXAM CHEST 1 VIEW: CPT

## 2024-11-20 PROCEDURE — 82947 ASSAY GLUCOSE BLOOD QUANT: CPT

## 2024-11-20 PROCEDURE — 99232 SBSQ HOSP IP/OBS MODERATE 35: CPT | Performed by: FAMILY MEDICINE

## 2024-11-20 PROCEDURE — 6360000002 HC RX W HCPCS: Performed by: FAMILY MEDICINE

## 2024-11-20 PROCEDURE — 6370000000 HC RX 637 (ALT 250 FOR IP): Performed by: FAMILY MEDICINE

## 2024-11-20 PROCEDURE — 36415 COLL VENOUS BLD VENIPUNCTURE: CPT

## 2024-11-20 PROCEDURE — 82248 BILIRUBIN DIRECT: CPT

## 2024-11-20 PROCEDURE — 85025 COMPLETE CBC W/AUTO DIFF WBC: CPT

## 2024-11-20 PROCEDURE — 2700000000 HC OXYGEN THERAPY PER DAY

## 2024-11-20 RX ORDER — FUROSEMIDE 10 MG/ML
20 INJECTION INTRAMUSCULAR; INTRAVENOUS ONCE
Status: COMPLETED | OUTPATIENT
Start: 2024-11-20 | End: 2024-11-20

## 2024-11-20 RX ADMIN — FUROSEMIDE 20 MG: 10 INJECTION, SOLUTION INTRAMUSCULAR; INTRAVENOUS at 17:52

## 2024-11-20 RX ADMIN — Medication 1000 MG: at 13:01

## 2024-11-20 RX ADMIN — LEVOTHYROXINE SODIUM 100 MCG: 50 TABLET ORAL at 05:04

## 2024-11-20 RX ADMIN — PANTOPRAZOLE SODIUM 40 MG: 40 TABLET, DELAYED RELEASE ORAL at 05:04

## 2024-11-20 RX ADMIN — ENOXAPARIN SODIUM 40 MG: 100 INJECTION SUBCUTANEOUS at 09:15

## 2024-11-20 RX ADMIN — CARVEDILOL 6.25 MG: 3.12 TABLET, FILM COATED ORAL at 09:14

## 2024-11-20 RX ADMIN — SODIUM CHLORIDE, PRESERVATIVE FREE 10 ML: 5 INJECTION INTRAVENOUS at 20:03

## 2024-11-20 RX ADMIN — DEXTROMETHORPHAN POLISTIREX 60 MG: 30 SUSPENSION ORAL at 20:02

## 2024-11-20 RX ADMIN — TRAZODONE HYDROCHLORIDE 100 MG: 100 TABLET ORAL at 20:02

## 2024-11-20 RX ADMIN — SODIUM CHLORIDE, PRESERVATIVE FREE 10 ML: 5 INJECTION INTRAVENOUS at 09:15

## 2024-11-20 RX ADMIN — MELOXICAM 15 MG: 7.5 TABLET ORAL at 09:14

## 2024-11-20 RX ADMIN — AMLODIPINE BESYLATE 5 MG: 5 TABLET ORAL at 09:14

## 2024-11-20 RX ADMIN — CITALOPRAM HYDROBROMIDE 20 MG: 20 TABLET ORAL at 09:14

## 2024-11-20 RX ADMIN — CARVEDILOL 6.25 MG: 3.12 TABLET, FILM COATED ORAL at 17:53

## 2024-11-20 RX ADMIN — ACETAMINOPHEN 650 MG: 325 TABLET ORAL at 20:02

## 2024-11-20 RX ADMIN — METRONIDAZOLE 500 MG: 500 INJECTION, SOLUTION INTRAVENOUS at 13:11

## 2024-11-20 RX ADMIN — DEXTROMETHORPHAN POLISTIREX 60 MG: 30 SUSPENSION ORAL at 09:25

## 2024-11-20 RX ADMIN — VALSARTAN 80 MG: 160 TABLET, FILM COATED ORAL at 09:15

## 2024-11-20 RX ADMIN — METRONIDAZOLE 500 MG: 500 INJECTION, SOLUTION INTRAVENOUS at 05:09

## 2024-11-20 ASSESSMENT — PAIN DESCRIPTION - DESCRIPTORS: DESCRIPTORS: ACHING

## 2024-11-20 ASSESSMENT — PAIN SCALES - GENERAL
PAINLEVEL_OUTOF10: 3
PAINLEVEL_OUTOF10: 0

## 2024-11-20 ASSESSMENT — PAIN DESCRIPTION - LOCATION: LOCATION: RIB CAGE

## 2024-11-20 ASSESSMENT — PAIN DESCRIPTION - ORIENTATION: ORIENTATION: RIGHT

## 2024-11-20 NOTE — PROGRESS NOTES
Physical Therapy  Facility/Department: [address]  Physical Therapy Daily Documentation Note    Name: Clary Harris  : 1945  MRN: [id number]  Date of Service: 2024    Discharge Recommendations:  No therapy recommended at discharge   PT Equipment Recommendations  Equipment Needed: No (has rolling walker at home)      Patient Diagnosis(es): The primary encounter diagnosis was Chest pain, unspecified type. Diagnoses of Dyspnea, unspecified type, Hypoxia, Pneumonia of both lower lobes due to infectious organism, and Chronic obstructive pulmonary disease, unspecified COPD type (HCC) were also pertinent to this visit.  Past Medical History:  has a past medical history of COPD (chronic obstructive pulmonary disease) (HCC), Diverticulosis, DJD (degenerative joint disease), Hypertension, Hypertriglyceridemia, Hypothyroidism, Mild valvular heart disease, Osteoarthritis, Sleep apnea, Type 2 diabetes mellitus without complication (HCC), and Vertigo.  Past Surgical History:  has a past surgical history that includes Tubal ligation; Cholecystectomy; Colonoscopy; Breast biopsy; IR CHEST TUBE INSERTION (2024); and CT GUIDED CHEST TUBE (2024).    Assessment  Body Structures, Functions, Activity Limitations Requiring Skilled Therapeutic Intervention: Decreased functional mobility ;Decreased safe awareness;Decreased endurance  Assessment: Pt presents with community acquired pneumonia and currently has a chest tube. At baseline, pt lives alone and was independent gait no device, ind ADL's and all IADL's including yard work. Pt currently demonstrating modified independence with bed mobility, independence with transfers, pt ambulated 25+5ft w/out device and 120ft with rolling walker and supervision (assist lines/chest tube.) Pt on 2L O2 througout PT session with lowest saturation at 84% once but occasional 87-89% and pt demonstrating good breathing technique to increase over 90% within 1 minute. Pt reported  entrance, Stairs to enter without rails  Entrance Stairs - Number of Steps: 1 [address]AD[address] front door; ramp garage  Bathroom Shower/Tub: Walk-in shower  Bathroom Toilet: Handicap height  Bathroom Equipment: Grab bars in shower, Built-in shower seat, Grab bars around toilet  Home Equipment: Cane  Has the patient had two or more falls in the past year or any fall with injury in the past year?: No  ADL Assistance: Independent  Homemaking Assistance: Independent  Homemaking Responsibilities: Yes  Ambulation Assistance: Independent  Transfer Assistance: Independent  Active : Yes  Mode of Transportation: SUV  Occupation: Retired  Type of Occupation:   Leisure & Hobbies: Reading; crafts  IADL Comments: IND IADLs PLOF  Vision/Hearing       Cognition   Orientation  Overall Orientation Status: Within Functional Limits  Orientation Level: Oriented X4  Cognition  Overall Cognitive Status: WNL    Objective                              Bed mobility  Supine to Sit: Unable to assess  Sit to Supine: Unable to assess  Scooting: Independent  Bed Mobility Comments: pt in recliner upon arrival and return to recliner at end of session  Transfers  Sit to Stand: Independent  Stand to Sit: Independent  Comment: recliner and toilet w/ rails  Ambulation  Surface: Level tile  Other Apparatus: O2 (2L/M, IV, chest tube off suction for gait)  Assistance: Supervision (assist for lines)  Quality of Gait: narrow base of support; trendelenberg type gait  Gait Deviations: Decreased step height  Distance: 25ft+5ft  Comments: Lowest O2 saturation at 89%. Pt request to walk to bathroom first; no device to toilet then sink and doorway where RW set up w/ chest tube for walk in romero.  More Ambulation?: Yes  Ambulation 2  Surface - 2: level tile  Device 2: Rolling Walker  Other Apparatus 2: O2 (2L/M, IV, chest tube off suction for gait)  Assistance 2: Supervision (assist for lines including O2 tank)  Quality of Gait 2: cues to increase SOBIA w/

## 2024-11-20 NOTE — PROGRESS NOTES
Pulmonary Progress Note  [facility]  Dr Lopez Lynch/Dr Jose Ramon Brewer/Dr Won ROJAS AGACNP-BC  Elisabet ROJAS NP-C      Patient - Clary Harris,  Age - 79 y.o.    - 1945      Room Number - 343/343-01   [facility] -  [id number]   [facility] # - [id number]  Date of Admission -  2024  9:06 PM        Consulting Service/Physician   Consulting - Luis Galvan MD  Primary Care Physician - Kandi Thompson DO     SUBJECTIVE   Patient evaluated bedside laying in bed in no acute distress.  She reports that her breathing feels about the same today as yesterday and denies having any symptoms of pain at this time.  The patient has no new concerns and reports that she has been able to ambulate without becoming short of breath while using supplemental oxygen.  Has been able to eat and drink normally without bowel or bladder dysfunction.    On evaluation of her catheter and chest tube, there was minimal drainage overnight per nursing team and on my inspection.  Repeat x-ray imaging without significant change.    OBJECTIVE   VITALS    height is 1.651 m (5' 5\") and weight is 83.9 kg (185 lb). Her oral temperature is 98.3 °F (36.8 °C). Her blood pressure is 128/77 and her pulse is 69. Her respiration is 19 and oxygen saturation is 95%.     Body mass index is 30.79 kg/m².  Temperature Range: Temp: 98.3 °F (36.8 °C) Temp  Av.2 °F (36.8 °C)  Min: 97.7 °F (36.5 °C)  Max: 98.6 °F (37 °C)  BP Range:  Systolic (24hrs), Av , Min:119 , Max:128     Diastolic (24hrs), Av, Min:72, Max:77    Pulse Range: Pulse  Av  Min: 69  Max: 73  Respiration Range: Resp  Av.3  Min: 19  Max: 20  Current Pulse Ox::  SpO2: 95 %  24HR Pulse Ox Range:  SpO2  Av.7 %  Min: 94 %  Max: 95 %  Oxygen Amount and Delivery: O2 Flow Rate (L/min): 2 L/min    Wt Readings from Last 3 Encounters:   24 83.9 kg (185  did not recall the name of the [email or web address] leukocytosis related to pneumonia  Former tobacco use, quitting 30 years ago, 22 pack year history age 19 to age 41 approximately 1 pack/day she tells me  Leukocytosis, improving  Elevated liver enzymes felt to be potentially related to side effect of Unasyn, improved  Mild hypernatremia, resolved  Mild hypokalemia, resolved  Full Code     Minimal change on repeat chest x-ray  Chest tube with minimal overnight drainage, estimated at 2 mL per nursing.  Plan to take chest tube off suction  We will continue to attempt to wean patient off supplemental oxygen  Likely requires LTAC given duration of hospitalization    Electronically signed by Michael Lara DO on 11/20/2024 at 9:01 AM      Patient seen and examined independently by me. Above discussed and I agree with resident note except where indicated in the EMR revision history. Also see my additional comments and changes indicated by discrete font, text color, italics, and/or initials. Labs, cultures, and radiographs where available were reviewed.     Looks comfortable today, very minimal drainage from chest tube.  We will place on waterseal.  Tomorrow, we will check the x-ray if it looks okay we will clamp the tube and check a follow-up x-ray.  If it is okay, we will remove it     On exam, I am still hearing crackles on the left side.  She is very diminished on the right side.  I am going to order 1 dose of Lasix.    Discontinue IV antibiotics, they were started on November 8  Electronically signed by Lopez Escalante MD on 11/20/2024 at 4:00 PM

## 2024-11-20 NOTE — PLAN OF CARE
stability and activity tolerance for standing, transferring and ambulating with or without assistive devices   Assist with transfers and ambulation using safe patient handling equipment as needed  Taken 11/20/2024 1309  Return Mobility to Safest Level of Function:   Assess patient stability and activity tolerance for standing, transferring and ambulating with or without assistive devices   Assist with transfers and ambulation using safe patient handling equipment as needed  Taken 11/20/2024 0720  Return Mobility to Safest Level of Function:   Assess patient stability and activity tolerance for standing, transferring and ambulating with or without assistive devices   Assist with transfers and ambulation using safe patient handling equipment as needed   Ensure adequate protection for wounds/incisions during mobilization     Problem: Musculoskeletal - Adult  Goal: Maintain proper alignment of affected body part  Outcome: Progressing  Flowsheets  Taken 11/20/2024 1600  Maintain proper alignment of affected body part: Support and protect limb and body alignment per provider's orders  Taken 11/20/2024 1309  Maintain proper alignment of affected body part: Support and protect limb and body alignment per provider's orders     Problem: Gastrointestinal - Adult  Goal: Minimal or absence of nausea and vomiting  Outcome: Progressing  Flowsheets  Taken 11/20/2024 1600  Minimal or absence of nausea and vomiting: Administer IV fluids as ordered to ensure adequate hydration  Taken 11/20/2024 1309  Minimal or absence of nausea and vomiting: Administer IV fluids as ordered to ensure adequate hydration  Taken 11/20/2024 0720  Minimal or absence of nausea and vomiting: Administer IV fluids as ordered to ensure adequate hydration     Problem: Infection - Adult  Goal: Absence of infection at discharge  Outcome: Progressing  Flowsheets  Taken 11/20/2024 1600  Absence of infection at discharge:   Assess and monitor for signs and symptoms  of infection   Monitor lab/diagnostic results  Taken 11/20/2024 1309  Absence of infection at discharge:   Assess and monitor for signs and symptoms of infection   Monitor lab/diagnostic results   Monitor all insertion sites i.e., indwelling lines, tubes and drains  Taken 11/20/2024 0720  Absence of infection at discharge: Assess and monitor for signs and symptoms of infection     Problem: Metabolic/Fluid and Electrolytes - Adult  Goal: Electrolytes maintained within normal limits  Outcome: Progressing  Flowsheets  Taken 11/20/2024 1600  Electrolytes maintained within normal limits:   Monitor labs and assess patient for signs and symptoms of electrolyte imbalances   Administer electrolyte replacement as ordered  Taken 11/20/2024 1309  Electrolytes maintained within normal limits:   Monitor labs and assess patient for signs and symptoms of electrolyte imbalances   Administer electrolyte replacement as ordered  Taken 11/20/2024 0720  Electrolytes maintained within normal limits:   Monitor labs and assess patient for signs and symptoms of electrolyte imbalances   Administer electrolyte replacement as ordered     Problem: Hematologic - Adult  Goal: Maintains hematologic stability  Outcome: Progressing  Flowsheets  Taken 11/20/2024 1600  Maintains hematologic stability: Assess for signs and symptoms of bleeding or hemorrhage  Taken 11/20/2024 1309  Maintains hematologic stability: Assess for signs and symptoms of bleeding or hemorrhage  Taken 11/20/2024 0720  Maintains hematologic stability: Assess for signs and symptoms of bleeding or hemorrhage     Problem: Skin/Tissue Integrity  Goal: Absence of new skin breakdown  Description: 1.  Monitor for areas of redness and/or skin breakdown  2.  Assess vascular access sites hourly  3.  Every 4-6 hours minimum:  Change oxygen saturation probe site  4.  Every 4-6 hours:  If on nasal continuous positive airway pressure, respiratory therapy assess nares and determine need for

## 2024-11-20 NOTE — PLAN OF CARE
Problem: Chronic Conditions and Co-morbidities  Goal: Patient's chronic conditions and co-morbidity symptoms are monitored and maintained or improved  Outcome: Progressing  Flowsheets (Taken 11/18/2024 1106 by Lula Rayo, RN)  Care Plan - Patient's Chronic Conditions and Co-Morbidity Symptoms are Monitored and Maintained or Improved:   Monitor and assess patient's chronic conditions and comorbid symptoms for stability, deterioration, or improvement   Collaborate with multidisciplinary team to address chronic and comorbid conditions and prevent exacerbation or deterioration   Update acute care plan with appropriate goals if chronic or comorbid symptoms are exacerbated and prevent overall improvement and discharge     Problem: Discharge Planning  Goal: Discharge to home or other facility with appropriate resources  Outcome: Progressing  Flowsheets (Taken 11/18/2024 1106 by Lula Rayo, RN)  Discharge to home or other facility with appropriate resources:   Identify barriers to discharge with patient and caregiver   Arrange for needed discharge resources and transportation as appropriate   Identify discharge learning needs (meds, wound care, etc)   Refer to discharge planning if patient needs post-hospital services based on physician order or complex needs related to functional status, cognitive ability or social support system     Problem: Pain  Goal: Verbalizes/displays adequate comfort level or baseline comfort level  Outcome: Progressing  Flowsheets (Taken 11/18/2024 1106 by Lula Rayo, RN)  Verbalizes/displays adequate comfort level or baseline comfort level:   Encourage patient to monitor pain and request assistance   Assess pain using appropriate pain scale   Administer analgesics based on type and severity of pain and evaluate response   Implement non-pharmacological measures as appropriate and evaluate response   Notify Licensed Independent Practitioner if interventions unsuccessful or patient

## 2024-11-20 NOTE — PROGRESS NOTES
[facility]  IN-PATIENT SERVICE   [facility]    Progress Note    11/20/2024    10:55 AM    Name:   Clary Harris  MRN:     [id number]     Acct:      [id number]   Room:   [address] [place] [address] Day:  18  Admit Date:  11/2/2024  9:06 PM    PCP:   Kandi Thompson DO  Code Status:  Full Code    Subjective:     Patient feels okay, no significant cough, no fever chills    Medications:     Allergies:    Allergies   Allergen Reactions    Hydrochlorothiazide     Sulfa Antibiotics Hives, Other (See Comments) and Itching       Current Meds:   Scheduled Meds:    cefTRIAXone (ROCEPHIN) IV  1,000 mg IntraVENous Q24H    metroNIDAZOLE  500 mg IntraVENous Q8H    insulin lispro  3-15 Units SubCUTAneous TID WC    Budeson-Glycopyrrol-Formoterol  2 puff Inhalation BID    levothyroxine  100 mcg Oral Daily    meloxicam  15 mg Oral Daily    traZODone  100 mg Oral Nightly    dextromethorphan  60 mg Oral 2 times per day    sodium chloride  80 mL IntraVENous Once    sodium chloride flush  5-40 mL IntraVENous 2 times per day    enoxaparin  40 mg SubCUTAneous Daily    amLODIPine  5 mg Oral Daily    valsartan  80 mg Oral Daily    carvedilol  6.25 mg Oral BID WC    citalopram  20 mg Oral Daily    pantoprazole  40 mg Oral QAM AC     Continuous Infusions:    dextrose      sodium chloride 10 mL/hr at 11/16/24 1228     PRN Meds: glucose, dextrose bolus **OR** dextrose bolus, glucagon (rDNA), dextrose, albuterol, benzonatate, sodium chloride flush, sodium chloride, potassium chloride **OR** potassium alternative oral replacement **OR** potassium chloride, magnesium sulfate, ondansetron **OR** ondansetron, polyethylene glycol, acetaminophen **OR** acetaminophen      ROS:       GENERAL           no fever/chills    sleep was good no significant fatigue  SKIN no rash or itching  EYE   vision unremarkable  ENT   no upper airway congestion        nose/throat/ears unremarkable      HEART denies chest pain   some dyspnea

## 2024-11-20 NOTE — TELEPHONE ENCOUNTER
10/3/23 patient  called and  having   vaginal  itching  and   needs  something  for yeast infection  called into  the  [facility] in  [facility]
Script sent to [facility]
temporal

## 2024-11-21 ENCOUNTER — APPOINTMENT (OUTPATIENT)
Dept: GENERAL RADIOLOGY | Age: 79
DRG: 193 | End: 2024-11-21
Payer: MEDICARE

## 2024-11-21 LAB
ALBUMIN SERPL-MCNC: 3 G/DL (ref 3.5–5.2)
ALBUMIN/GLOB SERPL: 0.9 {RATIO} (ref 1–2.5)
ALP SERPL-CCNC: 74 U/L (ref 35–104)
ALT SERPL-CCNC: 9 U/L (ref 5–33)
ANION GAP SERPL CALCULATED.3IONS-SCNC: 7 MMOL/L (ref 9–17)
AST SERPL-CCNC: 12 U/L
BASOPHILS # BLD: 0.1 K/UL (ref 0–0.2)
BASOPHILS NFR BLD: 1 % (ref 0–2)
BILIRUB SERPL-MCNC: 0.3 MG/DL (ref 0.3–1.2)
BUN SERPL-MCNC: 10 MG/DL (ref 8–23)
CALCIUM SERPL-MCNC: 8.7 MG/DL (ref 8.6–10.4)
CHLORIDE SERPL-SCNC: 104 MMOL/L (ref 98–107)
CO2 SERPL-SCNC: 31 MMOL/L (ref 20–31)
CREAT SERPL-MCNC: 0.6 MG/DL (ref 0.5–0.9)
EOSINOPHIL # BLD: 0.1 K/UL (ref 0–0.4)
EOSINOPHILS RELATIVE PERCENT: 2 % (ref 1–4)
ERYTHROCYTE [DISTWIDTH] IN BLOOD BY AUTOMATED COUNT: 15.7 % (ref 12.5–15.4)
GFR, ESTIMATED: >90 ML/MIN/1.73M2
GLUCOSE BLD-MCNC: 112 MG/DL (ref 65–105)
GLUCOSE BLD-MCNC: 146 MG/DL (ref 65–105)
GLUCOSE BLD-MCNC: 155 MG/DL (ref 65–105)
GLUCOSE SERPL-MCNC: 160 MG/DL (ref 70–99)
HCT VFR BLD AUTO: 32.5 % (ref 36–46)
HGB BLD-MCNC: 10.6 G/DL (ref 12–16)
LYMPHOCYTES NFR BLD: 1.2 K/UL (ref 1–4.8)
LYMPHOCYTES RELATIVE PERCENT: 19 % (ref 24–44)
MCH RBC QN AUTO: 28.5 PG (ref 26–34)
MCHC RBC AUTO-ENTMCNC: 32.6 G/DL (ref 31–37)
MCV RBC AUTO: 87.6 FL (ref 80–100)
MONOCYTES NFR BLD: 0.6 K/UL (ref 0.1–1.2)
MONOCYTES NFR BLD: 10 % (ref 2–11)
NEUTROPHILS NFR BLD: 68 % (ref 36–66)
NEUTS SEG NFR BLD: 4.4 K/UL (ref 1.8–7.7)
PLATELET # BLD AUTO: 461 K/UL (ref 140–450)
PMV BLD AUTO: 7.5 FL (ref 6–12)
POTASSIUM SERPL-SCNC: 5.2 MMOL/L (ref 3.7–5.3)
PROT SERPL-MCNC: 6.4 G/DL (ref 6.4–8.3)
RBC # BLD AUTO: 3.72 M/UL (ref 4–5.2)
SODIUM SERPL-SCNC: 142 MMOL/L (ref 135–144)
WBC OTHER # BLD: 6.4 K/UL (ref 3.5–11)

## 2024-11-21 PROCEDURE — 80053 COMPREHEN METABOLIC PANEL: CPT

## 2024-11-21 PROCEDURE — 82947 ASSAY GLUCOSE BLOOD QUANT: CPT

## 2024-11-21 PROCEDURE — 85025 COMPLETE CBC W/AUTO DIFF WBC: CPT

## 2024-11-21 PROCEDURE — 36415 COLL VENOUS BLD VENIPUNCTURE: CPT

## 2024-11-21 PROCEDURE — 2580000003 HC RX 258: Performed by: FAMILY MEDICINE

## 2024-11-21 PROCEDURE — 97116 GAIT TRAINING THERAPY: CPT

## 2024-11-21 PROCEDURE — 6360000002 HC RX W HCPCS: Performed by: FAMILY MEDICINE

## 2024-11-21 PROCEDURE — 97110 THERAPEUTIC EXERCISES: CPT

## 2024-11-21 PROCEDURE — 6360000002 HC RX W HCPCS: Performed by: INTERNAL MEDICINE

## 2024-11-21 PROCEDURE — 97530 THERAPEUTIC ACTIVITIES: CPT

## 2024-11-21 PROCEDURE — 0WPBX0Z REMOVAL OF DRAINAGE DEVICE FROM LEFT PLEURAL CAVITY, EXTERNAL APPROACH: ICD-10-PCS | Performed by: INTERNAL MEDICINE

## 2024-11-21 PROCEDURE — 2060000000 HC ICU INTERMEDIATE R&B

## 2024-11-21 PROCEDURE — 71045 X-RAY EXAM CHEST 1 VIEW: CPT

## 2024-11-21 PROCEDURE — 6370000000 HC RX 637 (ALT 250 FOR IP): Performed by: FAMILY MEDICINE

## 2024-11-21 RX ORDER — MORPHINE SULFATE 2 MG/ML
2 INJECTION, SOLUTION INTRAMUSCULAR; INTRAVENOUS ONCE
Status: COMPLETED | OUTPATIENT
Start: 2024-11-21 | End: 2024-11-21

## 2024-11-21 RX ADMIN — ACETAMINOPHEN 650 MG: 325 TABLET ORAL at 21:08

## 2024-11-21 RX ADMIN — VALSARTAN 80 MG: 160 TABLET, FILM COATED ORAL at 09:26

## 2024-11-21 RX ADMIN — PANTOPRAZOLE SODIUM 40 MG: 40 TABLET, DELAYED RELEASE ORAL at 05:35

## 2024-11-21 RX ADMIN — ENOXAPARIN SODIUM 40 MG: 100 INJECTION SUBCUTANEOUS at 09:26

## 2024-11-21 RX ADMIN — AMLODIPINE BESYLATE 5 MG: 5 TABLET ORAL at 09:26

## 2024-11-21 RX ADMIN — CITALOPRAM HYDROBROMIDE 20 MG: 20 TABLET ORAL at 09:26

## 2024-11-21 RX ADMIN — MORPHINE SULFATE 2 MG: 2 INJECTION, SOLUTION INTRAMUSCULAR; INTRAVENOUS at 16:02

## 2024-11-21 RX ADMIN — SODIUM CHLORIDE, PRESERVATIVE FREE 10 ML: 5 INJECTION INTRAVENOUS at 09:26

## 2024-11-21 RX ADMIN — MELOXICAM 15 MG: 7.5 TABLET ORAL at 09:26

## 2024-11-21 RX ADMIN — CARVEDILOL 6.25 MG: 3.12 TABLET, FILM COATED ORAL at 09:26

## 2024-11-21 RX ADMIN — SODIUM CHLORIDE, PRESERVATIVE FREE 10 ML: 5 INJECTION INTRAVENOUS at 21:09

## 2024-11-21 RX ADMIN — LEVOTHYROXINE SODIUM 100 MCG: 50 TABLET ORAL at 05:35

## 2024-11-21 RX ADMIN — MORPHINE SULFATE 2 MG: 2 INJECTION, SOLUTION INTRAMUSCULAR; INTRAVENOUS at 18:15

## 2024-11-21 RX ADMIN — TRAZODONE HYDROCHLORIDE 100 MG: 100 TABLET ORAL at 21:08

## 2024-11-21 ASSESSMENT — ENCOUNTER SYMPTOMS
DIARRHEA: 0
SHORTNESS OF BREATH: 0
SORE THROAT: 0
CONSTIPATION: 0
RHINORRHEA: 0
VOMITING: 0
NAUSEA: 0
CHEST TIGHTNESS: 0

## 2024-11-21 ASSESSMENT — PAIN SCALES - GENERAL: PAINLEVEL_OUTOF10: 2

## 2024-11-21 ASSESSMENT — PAIN DESCRIPTION - ORIENTATION: ORIENTATION: RIGHT

## 2024-11-21 ASSESSMENT — PAIN DESCRIPTION - LOCATION: LOCATION: BACK

## 2024-11-21 NOTE — PROGRESS NOTES
Physical Therapy  Facility/Department: [address]  Physical Therapy Daily Treatment Note    Name: Clary Harris  : 1945  MRN: [id number]  Date of Service: 2024    Discharge Recommendations:  No therapy recommended at discharge   PT Equipment Recommendations  Equipment Needed: No (has rolling walker at home)      Patient Diagnosis(es): The primary encounter diagnosis was Chest pain, unspecified type. Diagnoses of Dyspnea, unspecified type, Hypoxia, Pneumonia of both lower lobes due to infectious organism, and Chronic obstructive pulmonary disease, unspecified COPD type (HCC) were also pertinent to this visit.  Past Medical History:  has a past medical history of COPD (chronic obstructive pulmonary disease) (HCC), Diverticulosis, DJD (degenerative joint disease), Hypertension, Hypertriglyceridemia, Hypothyroidism, Mild valvular heart disease, Osteoarthritis, Sleep apnea, Type 2 diabetes mellitus without complication (HCC), and Vertigo.  Past Surgical History:  has a past surgical history that includes Tubal ligation; Cholecystectomy; Colonoscopy; Breast biopsy; IR CHEST TUBE INSERTION (2024); and CT GUIDED CHEST TUBE (2024).    Assessment  Body Structures, Functions, Activity Limitations Requiring Skilled Therapeutic Intervention: Decreased functional mobility ;Decreased safe awareness;Decreased endurance    Assessment: Pt presents with community acquired pneumonia and currently has a chest tube. At baseline, pt lives alone and was independent gait no device, ind ADL's and all IADL's including yard work. Pt currently demonstrating  independence with transfers, pt ambulated 160ft x 2 with rolling walker and modified independence. Pt negotiated one platform step with rolling walker and supervision. Pt on 2L O2 througout PT session with lowest saturation at 92%. Pt reported improved endurance with walker use. Pt demonstrates adequate safety for return to prior living situation. Pt will benefit

## 2024-11-21 NOTE — PROGRESS NOTES
[facility]  IN-PATIENT SERVICE   [facility]     Progress Note    11/21/2024    10:17 AM    Name:   Clary Harris  MRN:     [id number]     Acct:      [id number]   Room:   [facility]343-01   Day:  19  Admit Date:  11/2/2024  9:06 PM    PCP:   Kandi Thompson DO  Code Status:  Full Code    Subjective:     Overall patient is doing well today.  She still has the catheter in place it is off of suction and planning to be clamped and hopefully removed soon.  If she continues to do well then hopeful discharge home with home health in the next couple of days.  Patient is looking forward to this plan and feels that she is ready to go home and does not feel that she would need an LTAC.    Review of Systems   Constitutional:  Negative for chills and fever.   HENT:  Negative for hearing loss, postnasal drip, rhinorrhea and sore throat.    Eyes:  Negative for visual disturbance.   Respiratory:  Negative for chest tightness and shortness of breath.    Cardiovascular:  Negative for chest pain and palpitations.   Gastrointestinal:  Negative for constipation, diarrhea, nausea and vomiting.   Genitourinary:  Negative for dysuria.   Musculoskeletal:  Negative for arthralgias.   Skin:  Negative for rash.   Neurological:  Negative for dizziness, weakness, light-headedness, numbness and headaches.         Medications:     Allergies:    Allergies   Allergen Reactions    Hydrochlorothiazide     Sulfa Antibiotics Hives, Other (See Comments) and Itching       Current Meds:   Scheduled Meds:    insulin lispro  3-15 Units SubCUTAneous TID     Budeson-Glycopyrrol-Formoterol  2 puff Inhalation BID    levothyroxine  100 mcg Oral Daily    meloxicam  15 mg Oral Daily    traZODone  100 mg Oral Nightly    dextromethorphan  60 mg Oral 2 times per day    sodium chloride  80 mL IntraVENous Once    sodium chloride flush  5-40 mL IntraVENous 2 times per day    enoxaparin  40 mg SubCUTAneous Daily    amLODIPine  5 mg Oral Daily

## 2024-11-21 NOTE — CARE COORDINATION
[facility] Quality Flow/Interdisciplinary Rounds Progress Note    Quality Flow Rounds held on November 21, 2024 at 0930    Disciplines Attending:  Bedside Nurse, , and Nursing Unit Leadership    Barriers to Discharge: clinical status    Anticipated Discharge Date:   11/22/24    Anticipated Discharge Disposition: Home w/ [facility]    Readmission Risk              Risk of Unplanned Readmission:  12           Discussed patient goal for the day, patient clinical progression, and barriers to discharge.  Plan to remove chest tube today and repeat CXR tomorrow. Patient will need home O2 eval prior to dc. Accepted by [id number].      Izabela Aggarwal RN  November 21, 2024

## 2024-11-21 NOTE — PROGRESS NOTES
Occupational Therapy  Occupational Therapy Daily Treatment Note  Facility/Department: [address]   Patient Name: Clary Harris        MRN: [id number]    : 1945    Date of Service: 2024    Discharge Recommendations  Discharge Recommendations: Patient would benefit from continued therapy after discharge  OT Equipment Recommendations  Other: AE/DME recommnedations TBD    Chief Complaint   Patient presents with    Shortness of Breath    Chest Pain     Onset Wednesday    Chills     Past Medical History:  has a past medical history of COPD (chronic obstructive pulmonary disease) (HCC), Diverticulosis, DJD (degenerative joint disease), Hypertension, Hypertriglyceridemia, Hypothyroidism, Mild valvular heart disease, Osteoarthritis, Sleep apnea, Type 2 diabetes mellitus without complication (HCC), and Vertigo.  Past Surgical History:  has a past surgical history that includes Tubal ligation; Cholecystectomy; Colonoscopy; Breast biopsy; IR CHEST TUBE INSERTION (2024); and CT GUIDED CHEST TUBE (2024).    Assessment  Performance deficits / Impairments: Decreased functional mobility ;Decreased endurance;Decreased balance;Decreased ADL status  Assessment: Pt presents with the above functional deficits limiting return to PLOF.  At this time, pt is MOD I bed mobility transfers and sit<>stand transfers, SUP functional mobility with RW and appears to be safe to return to PLOF with assist as needed. Pt would benefit from skilled OT services during hospitalization however will likely not be in need of OT services following discharge.  Prognosis: Good  REQUIRES OT FOLLOW-UP: Yes  Activity Tolerance  Activity Tolerance: Patient Tolerated treatment well  Activity Tolerance Comments: Pt tolerated 2 sets of 15 repetitions of bicep curls and tricep extensions with use of medium resistance band  Safety Devices  Type of Devices: Call light within reach;Left in chair;Nurse notified;Gait belt  Restraints  Restraints  Initially in Place: No    Restrictions/Precautions  Restrictions/Precautions  Restrictions/Precautions: Fall Risk  Required Braces or Orthoses?: No  Implants present? : Metal implants (R TKA)  Position Activity Restriction  Other position/activity restrictions: Chest tube off suction as of 11/21/24. O2 via nasal canula. Up with assistance.    Subjective  General  Patient assessed for rehabilitation services?: Yes  Response to previous treatment: Patient with no complaints from previous session  Family / Caregiver Present: No  Subjective  Subjective: RN ok'd pt for therapy tx this AM. Pt reporting no pain at time of tx and was agreeable/cooperative throughout.    Objective  Orientation  Overall Orientation Status: Within Functional Limits  Orientation Level: Oriented X4  Cognition  Overall Cognitive Status: WNL    Activities of Daily Living  Additional Comments: Pt declined need for ADLs this AM    Balance  Balance  Sitting: Intact  Standing:  (static: Mod I dynamic: supervision)    Transfers/Mobility  Bed mobility  Supine to Sit: Modified independent  Sit to Supine: Unable to assess  Scooting: Independent  Bed Mobility Comments: HOB moderately elevated; retired to recliner following tx    Transfers  Sit to stand: Modified independent  Stand to sit: Modified independent  Transfer Comments: with use of RW; no LOB noted    Toilet Transfers  Toilet Transfers Comments: Declined need    Functional Mobility: Supervision  Functional Mobility Skilled Clinical Factors: SUP with use of RW from EOB<>room door x2; no LOB noted; pt on 1L of O2 via NC throughout session ranging 85%+ with mobility however inconsistent pleth reading    Patient Education  Patient Education  Education Provided: Transfer Training;Equipment;Energy Conservation;Fall Prevention Strategies;Home Exercise Program  Education Method: Verbal;Demonstration  Education Outcome: Verbalized understanding;Continued education needed    Goals  Short Term Goals  Time

## 2024-11-21 NOTE — PROGRESS NOTES
Pulmonary Progress Note  [facility]      Patient - Clary Harris,  Age - 79 y.o.    - 1945      Room Number - 343/343-01   N -  [id number]   [facility] # - [id number]  Date of Admission -  2024  9:06 PM        Consulting Service/Physician   Consulting - Luis Galvan MD  Primary Care Physician - Kandi Thompson DO     SUBJECTIVE     Looks to be in no distress, chest tube clamped this morning, repeat chest x-ray shows no worsening of pneumothorax or increased effusion.    OBJECTIVE   VITALS    height is 1.651 m (5' 5\") and weight is 83.9 kg (185 lb). Her oral temperature is 98.2 °F (36.8 °C). Her blood pressure is 108/71 and her pulse is 67. Her respiration is 20 and oxygen saturation is 93%.     Body mass index is 30.79 kg/m².  Temperature Range: Temp: 98.2 °F (36.8 °C) Temp  Av.2 °F (36.8 °C)  Min: 97.9 °F (36.6 °C)  Max: 98.6 °F (37 °C)  BP Range:  Systolic (24hrs), Av , Min:101 , Max:128     Diastolic (24hrs), Av, Min:69, Max:94    Pulse Range: Pulse  Av.3  Min: 67  Max: 84  Respiration Range: Resp  Av  Min: 17  Max: 21  Current Pulse Ox::  SpO2: 93 %  24HR Pulse Ox Range:  SpO2  Av.9 %  Min: 84 %  Max: 96 %  Oxygen Amount and Delivery: O2 Flow Rate (L/min): 1 L/min    Wt Readings from Last 3 Encounters:   24 83.9 kg (185 lb)   06/10/24 87.7 kg (193 lb 6.4 oz)   23 88.9 kg (196 lb)       I/O (24 Hours)    Intake/Output Summary (Last 24 hours) at 2024 1542  Last data filed at 2024 1242  Gross per 24 hour   Intake 540 ml   Output 2 ml   Net 538 ml       EXAM     General Appearance  Awake, alert, oriented, in no acute distress  HEENT - normocephalic, atraumatic. []  Mallampati  [x] Crowded airway   [] Macroglossia  []  Retrognathia  [] Micrognathia  []  Normal tongue size []  Normal Bite  [] Fan sign positive    Neck - Supple,  trachea midline   Lungs -diminished no wheezes or

## 2024-11-21 NOTE — PLAN OF CARE
Problem: Chronic Conditions and Co-morbidities  Goal: Patient's chronic conditions and co-morbidity symptoms are monitored and maintained or improved  11/20/2024 2106 by Jackie Vidal RN  Outcome: Progressing  Flowsheets (Taken 11/20/2024 1600 by Lula Rayo RN)  Care Plan - Patient's Chronic Conditions and Co-Morbidity Symptoms are Monitored and Maintained or Improved:   Monitor and assess patient's chronic conditions and comorbid symptoms for stability, deterioration, or improvement   Collaborate with multidisciplinary team to address chronic and comorbid conditions and prevent exacerbation or deterioration   Update acute care plan with appropriate goals if chronic or comorbid symptoms are exacerbated and prevent overall improvement and discharge  11/20/2024 1806 by Lula Rayo RN  Outcome: Progressing  Flowsheets  Taken 11/20/2024 1600  Care Plan - Patient's Chronic Conditions and Co-Morbidity Symptoms are Monitored and Maintained or Improved:   Monitor and assess patient's chronic conditions and comorbid symptoms for stability, deterioration, or improvement   Collaborate with multidisciplinary team to address chronic and comorbid conditions and prevent exacerbation or deterioration   Update acute care plan with appropriate goals if chronic or comorbid symptoms are exacerbated and prevent overall improvement and discharge  Taken 11/20/2024 1309  Care Plan - Patient's Chronic Conditions and Co-Morbidity Symptoms are Monitored and Maintained or Improved:   Monitor and assess patient's chronic conditions and comorbid symptoms for stability, deterioration, or improvement   Collaborate with multidisciplinary team to address chronic and comorbid conditions and prevent exacerbation or deterioration   Update acute care plan with appropriate goals if chronic or comorbid symptoms are exacerbated and prevent overall improvement and discharge  Taken 11/20/2024 0720  Care Plan - Patient's Chronic Conditions and  to discharge planning if patient needs post-hospital services based on physician order or complex needs related to functional status, cognitive ability or social support system  Taken 11/20/2024 0720  Discharge to home or other facility with appropriate resources:   Identify barriers to discharge with patient and caregiver   Arrange for needed discharge resources and transportation as appropriate   Identify discharge learning needs (meds, wound care, etc)   Refer to discharge planning if patient needs post-hospital services based on physician order or complex needs related to functional status, cognitive ability or social support system     Problem: Pain  Goal: Verbalizes/displays adequate comfort level or baseline comfort level  11/20/2024 2106 by Jackie Vidal RN  Outcome: Progressing  Flowsheets (Taken 11/20/2024 1622 by Lula Rayo RN)  Verbalizes/displays adequate comfort level or baseline comfort level:   Encourage patient to monitor pain and request assistance   Assess pain using appropriate pain scale   Administer analgesics based on type and severity of pain and evaluate response   Implement non-pharmacological measures as appropriate and evaluate response   Notify Licensed Independent Practitioner if interventions unsuccessful or patient reports new pain  11/20/2024 1806 by Lula Rayo RN  Outcome: Progressing  Flowsheets  Taken 11/20/2024 1622  Verbalizes/displays adequate comfort level or baseline comfort level:   Encourage patient to monitor pain and request assistance   Assess pain using appropriate pain scale   Administer analgesics based on type and severity of pain and evaluate response   Implement non-pharmacological measures as appropriate and evaluate response   Notify Licensed Independent Practitioner if interventions unsuccessful or patient reports new pain  Taken 11/20/2024 1137  Verbalizes/displays adequate comfort level or baseline comfort level:   Encourage patient to monitor

## 2024-11-22 ENCOUNTER — APPOINTMENT (OUTPATIENT)
Dept: GENERAL RADIOLOGY | Age: 79
DRG: 193 | End: 2024-11-22
Payer: MEDICARE

## 2024-11-22 VITALS
RESPIRATION RATE: 21 BRPM | BODY MASS INDEX: 30.82 KG/M2 | DIASTOLIC BLOOD PRESSURE: 69 MMHG | OXYGEN SATURATION: 92 % | HEART RATE: 82 BPM | HEIGHT: 65 IN | WEIGHT: 185 LBS | SYSTOLIC BLOOD PRESSURE: 96 MMHG | TEMPERATURE: 96.9 F

## 2024-11-22 LAB
ALBUMIN SERPL-MCNC: 3.1 G/DL (ref 3.5–5.2)
ALBUMIN/GLOB SERPL: 0.9 {RATIO} (ref 1–2.5)
ALP SERPL-CCNC: 83 U/L (ref 35–104)
ALT SERPL-CCNC: 9 U/L (ref 5–33)
ANION GAP SERPL CALCULATED.3IONS-SCNC: 8 MMOL/L (ref 9–17)
AST SERPL-CCNC: 13 U/L
BASOPHILS # BLD: 0.1 K/UL (ref 0–0.2)
BASOPHILS NFR BLD: 1 % (ref 0–2)
BILIRUB SERPL-MCNC: 0.3 MG/DL (ref 0.3–1.2)
BUN SERPL-MCNC: 11 MG/DL (ref 8–23)
CALCIUM SERPL-MCNC: 9.1 MG/DL (ref 8.6–10.4)
CHLORIDE SERPL-SCNC: 105 MMOL/L (ref 98–107)
CO2 SERPL-SCNC: 30 MMOL/L (ref 20–31)
CREAT SERPL-MCNC: 0.6 MG/DL (ref 0.5–0.9)
EOSINOPHIL # BLD: 0.1 K/UL (ref 0–0.4)
EOSINOPHILS RELATIVE PERCENT: 3 % (ref 1–4)
ERYTHROCYTE [DISTWIDTH] IN BLOOD BY AUTOMATED COUNT: 15.5 % (ref 12.5–15.4)
GFR, ESTIMATED: >90 ML/MIN/1.73M2
GLUCOSE BLD-MCNC: 113 MG/DL (ref 65–105)
GLUCOSE BLD-MCNC: 116 MG/DL (ref 65–105)
GLUCOSE SERPL-MCNC: 107 MG/DL (ref 70–99)
HCT VFR BLD AUTO: 33.2 % (ref 36–46)
HGB BLD-MCNC: 10.9 G/DL (ref 12–16)
LYMPHOCYTES NFR BLD: 1.5 K/UL (ref 1–4.8)
LYMPHOCYTES RELATIVE PERCENT: 27 % (ref 24–44)
MCH RBC QN AUTO: 28.5 PG (ref 26–34)
MCHC RBC AUTO-ENTMCNC: 32.7 G/DL (ref 31–37)
MCV RBC AUTO: 87.2 FL (ref 80–100)
MONOCYTES NFR BLD: 0.6 K/UL (ref 0.1–1.2)
MONOCYTES NFR BLD: 11 % (ref 2–11)
NEUTROPHILS NFR BLD: 58 % (ref 36–66)
NEUTS SEG NFR BLD: 3.3 K/UL (ref 1.8–7.7)
PLATELET # BLD AUTO: 502 K/UL (ref 140–450)
PMV BLD AUTO: 7.4 FL (ref 6–12)
POTASSIUM SERPL-SCNC: 3.7 MMOL/L (ref 3.7–5.3)
PROT SERPL-MCNC: 6.4 G/DL (ref 6.4–8.3)
RBC # BLD AUTO: 3.81 M/UL (ref 4–5.2)
SODIUM SERPL-SCNC: 143 MMOL/L (ref 135–144)
WBC OTHER # BLD: 5.7 K/UL (ref 3.5–11)

## 2024-11-22 PROCEDURE — 6370000000 HC RX 637 (ALT 250 FOR IP): Performed by: FAMILY MEDICINE

## 2024-11-22 PROCEDURE — 99239 HOSP IP/OBS DSCHRG MGMT >30: CPT | Performed by: FAMILY MEDICINE

## 2024-11-22 PROCEDURE — 2580000003 HC RX 258: Performed by: FAMILY MEDICINE

## 2024-11-22 PROCEDURE — 85025 COMPLETE CBC W/AUTO DIFF WBC: CPT

## 2024-11-22 PROCEDURE — 80053 COMPREHEN METABOLIC PANEL: CPT

## 2024-11-22 PROCEDURE — 6360000002 HC RX W HCPCS: Performed by: FAMILY MEDICINE

## 2024-11-22 PROCEDURE — 36415 COLL VENOUS BLD VENIPUNCTURE: CPT

## 2024-11-22 PROCEDURE — 71045 X-RAY EXAM CHEST 1 VIEW: CPT

## 2024-11-22 PROCEDURE — 82947 ASSAY GLUCOSE BLOOD QUANT: CPT

## 2024-11-22 RX ORDER — LEVOTHYROXINE SODIUM 100 UG/1
100 TABLET ORAL DAILY
Qty: 30 TABLET | Refills: 3 | Status: SHIPPED | OUTPATIENT
Start: 2024-11-23

## 2024-11-22 RX ORDER — FUROSEMIDE 20 MG/1
20 TABLET ORAL DAILY
Qty: 30 TABLET | Refills: 0 | Status: SHIPPED | OUTPATIENT
Start: 2024-11-23 | End: 2024-12-23

## 2024-11-22 RX ORDER — FUROSEMIDE 20 MG/1
20 TABLET ORAL DAILY
Status: DISCONTINUED | OUTPATIENT
Start: 2024-11-22 | End: 2024-11-22 | Stop reason: HOSPADM

## 2024-11-22 RX ORDER — SEMAGLUTIDE 1.34 MG/ML
0.5 INJECTION, SOLUTION SUBCUTANEOUS WEEKLY
Qty: 2 ML | Refills: 0 | Status: SHIPPED | OUTPATIENT
Start: 2024-11-22

## 2024-11-22 RX ADMIN — MELOXICAM 15 MG: 7.5 TABLET ORAL at 09:00

## 2024-11-22 RX ADMIN — PANTOPRAZOLE SODIUM 40 MG: 40 TABLET, DELAYED RELEASE ORAL at 05:29

## 2024-11-22 RX ADMIN — VALSARTAN 80 MG: 160 TABLET, FILM COATED ORAL at 09:00

## 2024-11-22 RX ADMIN — FUROSEMIDE 20 MG: 20 TABLET ORAL at 10:05

## 2024-11-22 RX ADMIN — SODIUM CHLORIDE, PRESERVATIVE FREE 10 ML: 5 INJECTION INTRAVENOUS at 08:59

## 2024-11-22 RX ADMIN — CITALOPRAM HYDROBROMIDE 20 MG: 20 TABLET ORAL at 09:00

## 2024-11-22 RX ADMIN — AMLODIPINE BESYLATE 5 MG: 5 TABLET ORAL at 09:00

## 2024-11-22 RX ADMIN — ENOXAPARIN SODIUM 40 MG: 100 INJECTION SUBCUTANEOUS at 09:00

## 2024-11-22 RX ADMIN — LEVOTHYROXINE SODIUM 100 MCG: 50 TABLET ORAL at 05:29

## 2024-11-22 RX ADMIN — ACETAMINOPHEN 650 MG: 325 TABLET ORAL at 09:00

## 2024-11-22 RX ADMIN — CARVEDILOL 6.25 MG: 3.12 TABLET, FILM COATED ORAL at 09:00

## 2024-11-22 ASSESSMENT — PAIN SCALES - GENERAL
PAINLEVEL_OUTOF10: 0
PAINLEVEL_OUTOF10: 1

## 2024-11-22 NOTE — PROGRESS NOTES
Pt discharged via wheelchair with all belongings, scripts and discharge paperwork. Follow up appointments and discharge instructions reviewed with pt and Daughter Milagro. All question answered to satisfaction. Pt NAVYA [phone]

## 2024-11-22 NOTE — PLAN OF CARE
Problem: Chronic Conditions and Co-morbidities  Goal: Patient's chronic conditions and co-morbidity symptoms are monitored and maintained or improved  11/22/2024 1528 by Lula Rayo RN  Outcome: Progressing  Flowsheets  Taken 11/22/2024 1249  Care Plan - Patient's Chronic Conditions and Co-Morbidity Symptoms are Monitored and Maintained or Improved:   Monitor and assess patient's chronic conditions and comorbid symptoms for stability, deterioration, or improvement   Collaborate with multidisciplinary team to address chronic and comorbid conditions and prevent exacerbation or deterioration   Update acute care plan with appropriate goals if chronic or comorbid symptoms are exacerbated and prevent overall improvement and discharge  Taken 11/22/2024 0715  Care Plan - Patient's Chronic Conditions and Co-Morbidity Symptoms are Monitored and Maintained or Improved:   Monitor and assess patient's chronic conditions and comorbid symptoms for stability, deterioration, or improvement   Collaborate with multidisciplinary team to address chronic and comorbid conditions and prevent exacerbation or deterioration   Update acute care plan with appropriate goals if chronic or comorbid symptoms are exacerbated and prevent overall improvement and discharge     Problem: Pain  Goal: Verbalizes/displays adequate comfort level or baseline comfort level  11/22/2024 1528 by Lula Rayo RN  Outcome: Progressing  Flowsheets  Taken 11/22/2024 1249  Verbalizes/displays adequate comfort level or baseline comfort level:   Encourage patient to monitor pain and request assistance   Assess pain using appropriate pain scale   Administer analgesics based on type and severity of pain and evaluate response   Implement non-pharmacological measures as appropriate and evaluate response   Notify Licensed Independent Practitioner if interventions unsuccessful or patient reports new pain  Taken 11/22/2024 0859  Verbalizes/displays adequate comfort

## 2024-11-22 NOTE — PROGRESS NOTES
[facility]  IN-PATIENT SERVICE   [facility]    Progress Note    11/22/2024    9:27 AM    Name:   Clary Harris  MRN:     [id number]     Acct:      [id number]   Room:   [address] [place] [address] Day:  20  Admit Date:  11/2/2024  9:06 PM    PCP:   Kandi Thompson DO  Code Status:  Full Code    Subjective:     Patient is doing well.  Her chest tubes were pulled yesterday.  She has some mild soreness there, but is otherwise doing well.  She is eating and drinking well.  Getting up to go to the bathroom.  She ambulated the hallways yesterday with PT.  She did use a walker to help with her balance.  She does have a walker at home if needed.  She continues to require 1-2 L of oxygen per NC.  She drops into the 80s on RA.    Medications:     Allergies:    Allergies   Allergen Reactions    Hydrochlorothiazide     Sulfa Antibiotics Hives, Other (See Comments) and Itching       Current Meds:   Scheduled Meds:    insulin lispro  3-15 Units SubCUTAneous TID WC    Budeson-Glycopyrrol-Formoterol  2 puff Inhalation BID    levothyroxine  100 mcg Oral Daily    meloxicam  15 mg Oral Daily    traZODone  100 mg Oral Nightly    dextromethorphan  60 mg Oral 2 times per day    sodium chloride  80 mL IntraVENous Once    sodium chloride flush  5-40 mL IntraVENous 2 times per day    enoxaparin  40 mg SubCUTAneous Daily    amLODIPine  5 mg Oral Daily    valsartan  80 mg Oral Daily    carvedilol  6.25 mg Oral BID WC    citalopram  20 mg Oral Daily    pantoprazole  40 mg Oral QAM AC     Continuous Infusions:    dextrose      sodium chloride 10 mL/hr at 11/16/24 1228     PRN Meds: glucose, dextrose bolus **OR** dextrose bolus, glucagon (rDNA), dextrose, albuterol, benzonatate, sodium chloride flush, sodium chloride, potassium chloride **OR** potassium alternative oral replacement **OR** potassium chloride, magnesium sulfate, ondansetron **OR** ondansetron, polyethylene glycol, acetaminophen **OR**

## 2024-11-22 NOTE — CARE COORDINATION
CM spoke with patient to discuss transitional planning. Patient plans to return home with [facility] services. Patient did not qualify for home oxygen. Patient states that her daughter will provide transportation home and she verbalizes having no additional transitional needs at this time. CM notified Raysa with [id number] that patient will be discharging today. HHC order and MINA needed.    Discharge Report    [facility]  Clinical Case Management Department  Written by: Izabela Aggarwal RN    Patient Name: Clary Harris  Attending Provider: Luis Galvan MD  Admit Date: 2024  9:06 PM  MRN: [id number]  Account: [id number]                     : 1945  Discharge Date: 24      Disposition: home w/ [id number]    Izabela Aggarwal RN

## 2024-11-22 NOTE — PROGRESS NOTES
Pulmonary Progress Note  [facility]  Dr Lopez Lynch/Dr Jose Ramon Brewer/Dr Won ROJAS AGACNP-BC  Elisabet ROJAS NP-C      Patient - Clary Harris,  Age - 79 y.o.    - 1945      Room Number - 343/343-01   [facility] -  [id number]   [facility] # - [id number]  Date of Admission -  2024  9:06 PM        Consulting Service/Physician   Consulting - Luis Galvan MD  Primary Care Physician - Kandi Thompson DO     SUBJECTIVE   Patient evaluated at bedside sitting up in bed in no acute distress on 1 L at this time.  Patient reports that her breathing is improved and has no new concerns since the chest tube was removed yesterday.  She notes that she has been able to walk around.  Per nursing the patient is now down to 1 L and sats well in the 90s but does desaturate with ambulation, patient is undergo a home oxygen study today    Repeat chest x-ray today shows no acute changes with no new significant abnormalities and lab testing    OBJECTIVE   VITALS    height is 1.651 m (5' 5\") and weight is 83.9 kg (185 lb). Her temporal temperature is 96.9 °F (36.1 °C). Her blood pressure is 113/72 and her pulse is 88. Her respiration is 16 and oxygen saturation is 93%.     Body mass index is 30.79 kg/m².  Temperature Range: Temp: 96.9 °F (36.1 °C) Temp  Av.9 °F (36.6 °C)  Min: 96.9 °F (36.1 °C)  Max: 98.5 °F (36.9 °C)  BP Range:  Systolic (24hrs), Av , Min:98 , Max:144     Diastolic (24hrs), Av, Min:56, Max:94    Pulse Range: Pulse  Av.1  Min: 62  Max: 88  Respiration Range: Resp  Av.7  Min: 14  Max: 25  Current Pulse Ox::  SpO2: 93 %  24HR Pulse Ox Range:  SpO2  Av.5 %  Min: 91 %  Max: 93 %  Oxygen Amount and Delivery: O2 Flow Rate (L/min): 1 L/min    Wt Readings from Last 3 Encounters:   24 83.9 kg (185 lb)   06/10/24 87.7 kg (193 lb 6.4 oz)   23 88.9 kg (196 lb)

## 2024-11-22 NOTE — PROGRESS NOTES
Home Oxygen Evaluation completed.    Resting SpO2 on room air = 94%    SpO2 on room air with exercise = 90%    Isabelle Hernández RCP  12:52 PM

## 2024-11-22 NOTE — PLAN OF CARE
Problem: Chronic Conditions and Co-morbidities  Goal: Patient's chronic conditions and co-morbidity symptoms are monitored and maintained or improved  Outcome: Progressing  Flowsheets  Taken 11/21/2024 1242  Care Plan - Patient's Chronic Conditions and Co-Morbidity Symptoms are Monitored and Maintained or Improved:   Monitor and assess patient's chronic conditions and comorbid symptoms for stability, deterioration, or improvement   Collaborate with multidisciplinary team to address chronic and comorbid conditions and prevent exacerbation or deterioration   Update acute care plan with appropriate goals if chronic or comorbid symptoms are exacerbated and prevent overall improvement and discharge  Taken 11/21/2024 0741  Care Plan - Patient's Chronic Conditions and Co-Morbidity Symptoms are Monitored and Maintained or Improved:   Monitor and assess patient's chronic conditions and comorbid symptoms for stability, deterioration, or improvement   Collaborate with multidisciplinary team to address chronic and comorbid conditions and prevent exacerbation or deterioration   Update acute care plan with appropriate goals if chronic or comorbid symptoms are exacerbated and prevent overall improvement and discharge     Problem: Discharge Planning  Goal: Discharge to home or other facility with appropriate resources  Outcome: Progressing  Flowsheets  Taken 11/21/2024 1242  Discharge to home or other facility with appropriate resources:   Identify barriers to discharge with patient and caregiver   Arrange for needed discharge resources and transportation as appropriate   Identify discharge learning needs (meds, wound care, etc)   Refer to discharge planning if patient needs post-hospital services based on physician order or complex needs related to functional status, cognitive ability or social support system  Taken 11/21/2024 0741  Discharge to home or other facility with appropriate resources:   Identify barriers to discharge

## 2024-11-22 NOTE — PLAN OF CARE
Problem: Chronic Conditions and Co-morbidities  Goal: Patient's chronic conditions and co-morbidity symptoms are monitored and maintained or improved  11/22/2024 0453 by Bronwyn Hernandez RN  Outcome: Progressing  11/21/2024 1949 by Lula Rayo RN  Outcome: Progressing  Flowsheets  Taken 11/21/2024 1242  Care Plan - Patient's Chronic Conditions and Co-Morbidity Symptoms are Monitored and Maintained or Improved:   Monitor and assess patient's chronic conditions and comorbid symptoms for stability, deterioration, or improvement   Collaborate with multidisciplinary team to address chronic and comorbid conditions and prevent exacerbation or deterioration   Update acute care plan with appropriate goals if chronic or comorbid symptoms are exacerbated and prevent overall improvement and discharge  Taken 11/21/2024 0741  Care Plan - Patient's Chronic Conditions and Co-Morbidity Symptoms are Monitored and Maintained or Improved:   Monitor and assess patient's chronic conditions and comorbid symptoms for stability, deterioration, or improvement   Collaborate with multidisciplinary team to address chronic and comorbid conditions and prevent exacerbation or deterioration   Update acute care plan with appropriate goals if chronic or comorbid symptoms are exacerbated and prevent overall improvement and discharge     Problem: Discharge Planning  Goal: Discharge to home or other facility with appropriate resources  11/22/2024 0453 by Bronwyn Hernandez RN  Outcome: Progressing  11/21/2024 1949 by Lula Rayo RN  Outcome: Progressing  Flowsheets  Taken 11/21/2024 1242  Discharge to home or other facility with appropriate resources:   Identify barriers to discharge with patient and caregiver   Arrange for needed discharge resources and transportation as appropriate   Identify discharge learning needs (meds, wound care, etc)   Refer to discharge planning if patient needs post-hospital services based on physician order or  Licensed Independent Practitioner for values outside of normal range  Taken 11/21/2024 0741  Maintains optimal cardiac output and hemodynamic stability:   Monitor blood pressure and heart rate   Monitor urine output and notify Licensed Independent Practitioner for values outside of normal range     Problem: Skin/Tissue Integrity - Adult  Goal: Skin integrity remains intact  11/22/2024 0453 by Bronwyn Hernandez RN  Outcome: Progressing  11/21/2024 1949 by Lula Rayo RN  Outcome: Progressing  Flowsheets  Taken 11/21/2024 1242  Skin Integrity Remains Intact: Monitor for areas of redness and/or skin breakdown  Taken 11/21/2024 0741  Skin Integrity Remains Intact: Monitor for areas of redness and/or skin breakdown     Problem: Musculoskeletal - Adult  Goal: Return mobility to safest level of function  11/22/2024 0453 by Bronwyn Hernandez RN  Outcome: Progressing  11/21/2024 1949 by Lula Rayo RN  Outcome: Progressing  Flowsheets  Taken 11/21/2024 1242  Return Mobility to Safest Level of Function:   Assess patient stability and activity tolerance for standing, transferring and ambulating with or without assistive devices   Assist with transfers and ambulation using safe patient handling equipment as needed  Taken 11/21/2024 0741  Return Mobility to Safest Level of Function:   Assist with transfers and ambulation using safe patient handling equipment as needed   Assess patient stability and activity tolerance for standing, transferring and ambulating with or without assistive devices  Goal: Maintain proper alignment of affected body part  11/22/2024 0453 by Bronwyn Hernandez RN  Outcome: Progressing  11/21/2024 1949 by Lula Rayo RN  Outcome: Progressing  Flowsheets  Taken 11/21/2024 1242  Maintain proper alignment of affected body part: Support and protect limb and body alignment per provider's orders  Taken 11/21/2024 0741  Maintain proper alignment of affected body part: Support and protect limb and body

## 2024-11-22 NOTE — DISCHARGE INSTR - COC
Continuity of Care Form    Patient Name: Clary Harris   :  1945  MRN:  [id number]    Admit date:  2024  Discharge date:  24    Code Status Order: Full Code   Advance Directives:   Advance Care Flowsheet Documentation             Admitting Physician:  Luis Galvan MD  PCP: Kandi Thompson DO    Discharging Nurse: Lula Rayo RN  Discharging Hospital Unit/Room#: 343/343-01  Discharging Unit Phone Number: [phone]    Emergency Contact:   Extended Emergency Contact Information  Primary Emergency Contact: Milagro Rome  Home Phone: [phone]  Mobile Phone: [phone]  Relation: Child  Preferred language: English   needed? No    Past Surgical History:  Past Surgical History:   Procedure Laterality Date    BREAST BIOPSY      CHOLECYSTECTOMY      COLONOSCOPY      CT GUIDED CHEST TUBE  2024    CT GUIDED CHEST TUBE 2024 [facility] CT SCAN    IR CHEST TUBE INSERTION  2024    IR CHEST TUBE INSERTION 2024 Kvng Marsh MD [facility] PROCED[facility]    TUBAL LIGATION         Immunization History:   Immunization History   Administered Date(s) Administered    COVID-19, PFIZER Bivalent, DO NOT Dilute, (age 12y+), IM, 30 mcg/0.3 mL 2022    COVID-19, PFIZER GRAY top, DO NOT Dilute, (age 12 y+), IM, 30 mcg/0.3 mL 2022    COVID-19, PFIZER PURPLE top, DILUTE for use, (age 12 y+), 30mcg/0.3mL 2021, 2021, 10/11/2021    COVID-19, PFIZER, (age 12y+), IM, 30mcg/0.3mL 10/12/2023    Influenza, FLUAD, (age 65 y+), IM, Quadv, 0.5mL 2022, 10/12/2023    Influenza, FLUAD, (age 65 y+), IM, Trivalent PF, 0.5mL 2018    Influenza, FLUZONE High Dose, (age 65 y+), IM, Trivalent PF, 0.5mL 2017, 10/22/2019, 2020    Pneumococcal, PCV-13, PREVNAR 13, (age 6w+), IM, 0.5mL 2015    Pneumococcal, PCV20, PREVNAR 20, (age 6w+), IM, 0.5mL 06/10/2024    Pneumococcal, PPSV23, PNEUMOVAX 23, (age 2y+), SC/IM, 0.5mL 2013    TDaP, ADACEL

## 2024-11-22 NOTE — PROCEDURES
PROCEDURE NOTE  Date: 2024   Name: Clary Harris  YOB: 1945    Procedures      Pleural catheter/chest tube removal      The patient was placed in the left lateral decubitus position, the dressing was taken off the pleural catheter.  She was premedicated with 2 mg of morphine.  We cut the sutures around the pleural catheter and had her .  We then covered the site with Vaseline gauze reinforced with a 4 x 4 and Tegaderm dressing.  Patient tolerated the procedure well

## 2024-11-24 NOTE — DISCHARGE SUMMARY
[facility]  IN-PATIENT SERVICE   [facility]    Discharge Summary     Patient ID: Clary Harris  :  1945   MRN: [id number]     ACCOUNT:  [id number]   Patient's PCP: Kandi Thompson DO  Admit Date: 2024   Discharge Date: 2024  Length of Stay: 20  Code Status:  Prior  Admitting Physician: Luis Galvan MD  Discharge Physician: Xiomara Palm MD     Active Discharge Diagnoses:     Hospital Problem Lists:  Principal Problem:    Pneumonia due to infectious agent  Active Problems:    COPD (chronic obstructive pulmonary disease) (HCC)    Hypertension    Hypothyroidism    Mild valvular heart disease    Obstructive sleep apnea    Type 2 diabetes mellitus without complication (HCC)    Chest pain    Pleural effusion associated with pulmonary infection    Protein calorie malnutrition (HCC)  Resolved Problems:    * No resolved hospital problems. *      Admission Condition:  fair     Discharged Condition: good    Hospital Stay:     Hospital Course:  Clary Harris is a 79 y.o. female who was admitted for the management of Pneumonia due to infectious agent , presented to ER with Shortness of Breath, Chest Pain (Onset Wednesday), and Chills    She required 4L of oxygen via NC and was started on IV rocephin and azithromycin. Pulmonary was consulted. Bedside u/s showed pleural debris and severe loculations.  Interventional radiology was consulted for chest tube placement that was performed on .  Patient underwent multiple flushes with tPA and dornase. Abx changed to unasyn and azithromycin and then to rocephin and flagyl due to elevated liver enzyme.  Oxygen requirements were able to be weaned to 2L. On , interventional radiology pulled patient's chest tube and placed two pigtail catheters to try to drain more of the loculated fluid. Antibiotics discontinued on . The pigtail catheters were flushed twice and then were able to be removed

## 2024-11-25 ENCOUNTER — TELEPHONE (OUTPATIENT)
Age: 79
End: 2024-11-25

## 2024-11-25 LAB
MICROORGANISM SPEC CULT: NORMAL
MICROORGANISM/AGENT SPEC: NORMAL
SPECIMEN DESCRIPTION: NORMAL

## 2024-11-25 NOTE — TELEPHONE ENCOUNTER
Care Transitions Initial Follow Up Call    Outreach made within 2 business days of discharge: Yes    Patient: Clary Harris Patient : 1945   MRN: [id number]  Reason for Admission: Pneumonia  Discharge Date: 24       Spoke with: Patient    Discharge department/facility: [facility]    TCM Interactive Patient Contact:  Was patient able to fill all prescriptions: Yes  Was patient instructed to bring all medications to the follow-up visit: Yes  Is patient taking all medications as directed in the discharge summary? Yes  Does patient understand their discharge instructions: Yes  Does patient have questions or concerns that need addressed prior to 7-14 day follow up office visit: no    Additional needs identified to be addressed with provider  No needs identified             Scheduled appointment with PCP within 7-14 days - 2024 at 4:20 PM    Follow Up  Future Appointments   Date Time Provider Department Center   2024  1:40 PM Kandi Thompson DO [place] [facility]       Keyona Acharya

## 2024-11-26 ENCOUNTER — TELEPHONE (OUTPATIENT)
Age: 79
End: 2024-11-26

## 2024-11-26 LAB
MICROORGANISM SPEC CULT: NORMAL
MICROORGANISM/AGENT SPEC: NORMAL
SPECIMEN DESCRIPTION: NORMAL

## 2024-11-26 NOTE — TELEPHONE ENCOUNTER
Madison/Anat called to let you know that patient did not want to do any Home Care. She informed them that she is doing ok and did not feel at this time that she needed it. Madison said if she were to change her mind all they would need is another referral sent to them

## 2024-11-29 PROBLEM — K80.20 CHOLELITHIASIS WITHOUT OBSTRUCTION: Status: ACTIVE | Noted: 2024-11-29

## 2024-12-02 LAB
MICROORGANISM SPEC CULT: NORMAL
MICROORGANISM SPEC CULT: NORMAL
MICROORGANISM/AGENT SPEC: NORMAL
MICROORGANISM/AGENT SPEC: NORMAL
SPECIMEN DESCRIPTION: NORMAL
SPECIMEN DESCRIPTION: NORMAL

## 2024-12-05 ENCOUNTER — OFFICE VISIT (OUTPATIENT)
Age: 79
End: 2024-12-05

## 2024-12-05 VITALS
TEMPERATURE: 96.8 F | SYSTOLIC BLOOD PRESSURE: 110 MMHG | OXYGEN SATURATION: 95 % | DIASTOLIC BLOOD PRESSURE: 62 MMHG | WEIGHT: 186.4 LBS | BODY MASS INDEX: 31.02 KG/M2 | HEART RATE: 96 BPM

## 2024-12-05 DIAGNOSIS — E11.8 TYPE II DIABETES MELLITUS WITH COMPLICATION (HCC): ICD-10-CM

## 2024-12-05 DIAGNOSIS — J18.9 PLEURAL EFFUSION ASSOCIATED WITH PULMONARY INFECTION: ICD-10-CM

## 2024-12-05 DIAGNOSIS — E03.9 HYPOTHYROIDISM, UNSPECIFIED TYPE: ICD-10-CM

## 2024-12-05 DIAGNOSIS — J18.9 PNEUMONIA OF RIGHT LOWER LOBE DUE TO INFECTIOUS ORGANISM: Primary | ICD-10-CM

## 2024-12-05 DIAGNOSIS — J91.8 PLEURAL EFFUSION ASSOCIATED WITH PULMONARY INFECTION: ICD-10-CM

## 2024-12-05 DIAGNOSIS — E78.2 MIXED HYPERLIPIDEMIA: ICD-10-CM

## 2024-12-05 DIAGNOSIS — F33.1 MAJOR DEPRESSIVE DISORDER, RECURRENT, MODERATE (HCC): ICD-10-CM

## 2024-12-05 SDOH — ECONOMIC STABILITY: FOOD INSECURITY: WITHIN THE PAST 12 MONTHS, THE FOOD YOU BOUGHT JUST DIDN'T LAST AND YOU DIDN'T HAVE MONEY TO GET MORE.: NEVER TRUE

## 2024-12-05 SDOH — ECONOMIC STABILITY: FOOD INSECURITY: WITHIN THE PAST 12 MONTHS, YOU WORRIED THAT YOUR FOOD WOULD RUN OUT BEFORE YOU GOT MONEY TO BUY MORE.: NEVER TRUE

## 2024-12-05 SDOH — ECONOMIC STABILITY: INCOME INSECURITY: HOW HARD IS IT FOR YOU TO PAY FOR THE VERY BASICS LIKE FOOD, HOUSING, MEDICAL CARE, AND HEATING?: NOT HARD AT ALL

## 2024-12-05 NOTE — PROGRESS NOTES
Vaccine Information Sheet, \"Influenza - Inactivated\"  given to Clary Harris, or parent/legal guardian of  Clary Harris and verbalized understanding.    Patient responses:    Have you ever had a reaction to a flu vaccine? No  Are you able to eat eggs without adverse effects?  Yes  Do you have any current illness?  No  Have you ever had Guillian Montrose Syndrome?  No    Flu vaccine given per order. Please see immunization tab.                
experienced the light-headedness on one occasion. She also notes night sweats which she was experiencing in the hospital as well.      She will be seeing Dr. Miguel later this month who will do a pulmonary workup such as imaging and possible PFT. She was told to leonardo/[facility]inic [facility] per Dr. Petersen. She notes that she has a history of COPD. She states that she may have scar tissue in her upper lung due to smoking, [place], and breathing in beautician chemicals.    She states that her appetite is reduced on Ozempic but makes sure to eat 3 meals a day or to supplement with a protein shake. She lost 9lbs in the hospital as she was on a low carb diet and her portions were smaller than what she eats at home.    She is taking Crestor, Celexa for mood, Meloxicam, and trazodone for sleep. She is also continuing Norvasc, Coreg, and Lasix. She monitors her BP at home with values of 122/72.     She has not seen the eye doctor recently. She has a history of cataract surgery. She plans to see Dr. Echavarria.     She reports a funny feeling in her feet and lower extremities. She states that she also had this in the hospital. She was taking potassium in the hospital but only took it for 4 days.     She did have covid in July as well.    She denies headaches, syncope, chest pain, nausea, vomiting, diarrhea, constipation, blood in the stool, edema, claudication, skin changes, vision changes, fever, or chills.    REVIEW OF SYSTEM      Review of Systems:   Constitutional:  Negative for chills, fatigue, fever and unexpected weight change.   Eyes:  Negative for visual disturbance.   Respiratory:  Negative for cough, chest tightness, shortness of breath and wheezing.    Cardiovascular:  Negative for chest pain, palpitations and leg swelling.   Gastrointestinal:  Negative for abdominal distention, abdominal pain, blood in stool, constipation, diarrhea, nausea and vomiting.   Genitourinary:  Negative for dysuria, hematuria and

## 2024-12-09 RX ORDER — LEVOTHYROXINE SODIUM 100 UG/1
TABLET ORAL
Refills: 0 | OUTPATIENT
Start: 2024-12-09

## 2024-12-09 RX ORDER — LEVOTHYROXINE SODIUM 100 UG/1
100 TABLET ORAL DAILY
Qty: 90 TABLET | Refills: 3 | Status: SHIPPED | OUTPATIENT
Start: 2024-12-09 | End: 2025-01-13 | Stop reason: SDUPTHER

## 2024-12-09 RX ORDER — FUROSEMIDE 20 MG/1
TABLET ORAL
Refills: 0 | OUTPATIENT
Start: 2024-12-09

## 2024-12-09 NOTE — TELEPHONE ENCOUNTER
Clary Harris is calling to request a refill on the following medication(s):    Medication Request:  Requested Prescriptions     Pending Prescriptions Disp Refills    levothyroxine (SYNTHROID) 100 MCG tablet [Pharmacy Med Name: L-THYROXINE (SYNTHROID) TABS 100MCG]  0    furosemide (LASIX) 20 MG tablet [Pharmacy Med Name: FUROSEMIDE TABS 20MG]  0       Last Visit Date (If Applicable):  Visit date not found    Next Visit Date:    Visit date not found

## 2024-12-19 ENCOUNTER — HOSPITAL ENCOUNTER (OUTPATIENT)
Age: 79
Setting detail: SPECIMEN
Discharge: HOME | End: 2024-12-19

## 2024-12-19 DIAGNOSIS — E11.8 TYPE II DIABETES MELLITUS WITH COMPLICATION (HCC): ICD-10-CM

## 2024-12-19 DIAGNOSIS — E78.2 MIXED HYPERLIPIDEMIA: ICD-10-CM

## 2024-12-19 DIAGNOSIS — K21.9 GASTROESOPHAGEAL REFLUX DISEASE WITHOUT ESOPHAGITIS: ICD-10-CM

## 2024-12-19 LAB
ALBUMIN SERPL-MCNC: 4 G/DL (ref 3.5–5.2)
ALBUMIN/GLOB SERPL: 1.4 {RATIO} (ref 1–2.5)
ALP SERPL-CCNC: 86 U/L (ref 35–104)
ALT SERPL-CCNC: 24 U/L (ref 10–35)
ANION GAP SERPL CALCULATED.3IONS-SCNC: 9 MMOL/L (ref 9–16)
AST SERPL-CCNC: 31 U/L (ref 10–35)
BASOPHILS # BLD: 0.03 K/UL (ref 0–0.2)
BASOPHILS NFR BLD: 1 % (ref 0–2)
BILIRUB SERPL-MCNC: 0.4 MG/DL (ref 0–1.2)
BUN SERPL-MCNC: 16 MG/DL (ref 8–23)
CALCIUM SERPL-MCNC: 9.5 MG/DL (ref 8.6–10.4)
CHLORIDE SERPL-SCNC: 104 MMOL/L (ref 98–107)
CHOLEST SERPL-MCNC: 122 MG/DL (ref 0–199)
CHOLESTEROL/HDL RATIO: 3.1
CO2 SERPL-SCNC: 30 MMOL/L (ref 20–31)
CREAT SERPL-MCNC: 0.8 MG/DL (ref 0.6–0.9)
EOSINOPHIL # BLD: 0.14 K/UL (ref 0–0.44)
EOSINOPHILS RELATIVE PERCENT: 3 % (ref 1–4)
ERYTHROCYTE [DISTWIDTH] IN BLOOD BY AUTOMATED COUNT: 14.2 % (ref 11.8–14.4)
GFR, ESTIMATED: 75 ML/MIN/1.73M2
GLUCOSE SERPL-MCNC: 132 MG/DL (ref 74–99)
HCT VFR BLD AUTO: 43.5 % (ref 36.3–47.1)
HDLC SERPL-MCNC: 39 MG/DL
HGB BLD-MCNC: 12.8 G/DL (ref 11.9–15.1)
IMM GRANULOCYTES # BLD AUTO: <0.03 K/UL (ref 0–0.3)
IMM GRANULOCYTES NFR BLD: 0 %
LDLC SERPL CALC-MCNC: 47 MG/DL (ref 0–100)
LYMPHOCYTES NFR BLD: 2.31 K/UL (ref 1.1–3.7)
LYMPHOCYTES RELATIVE PERCENT: 44 % (ref 24–43)
MCH RBC QN AUTO: 27.1 PG (ref 25.2–33.5)
MCHC RBC AUTO-ENTMCNC: 29.4 G/DL (ref 28.4–34.8)
MCV RBC AUTO: 92.2 FL (ref 82.6–102.9)
MONOCYTES NFR BLD: 0.42 K/UL (ref 0.1–1.2)
MONOCYTES NFR BLD: 8 % (ref 3–12)
NEUTROPHILS NFR BLD: 44 % (ref 36–65)
NEUTS SEG NFR BLD: 2.3 K/UL (ref 1.5–8.1)
NRBC BLD-RTO: 0 PER 100 WBC
PLATELET # BLD AUTO: 251 K/UL (ref 138–453)
PMV BLD AUTO: 10.1 FL (ref 8.1–13.5)
POTASSIUM SERPL-SCNC: 4.4 MMOL/L (ref 3.7–5.3)
PROT SERPL-MCNC: 6.9 G/DL (ref 6.6–8.7)
RBC # BLD AUTO: 4.72 M/UL (ref 3.95–5.11)
SODIUM SERPL-SCNC: 143 MMOL/L (ref 136–145)
TRIGL SERPL-MCNC: 180 MG/DL
VIT B12 SERPL-MCNC: 911 PG/ML (ref 232–1245)
VLDLC SERPL CALC-MCNC: 36 MG/DL (ref 1–30)
WBC OTHER # BLD: 5.2 K/UL (ref 3.5–11.3)

## 2024-12-19 RX ORDER — FUROSEMIDE 20 MG/1
20 TABLET ORAL DAILY
Qty: 30 TABLET | Refills: 2 | Status: SHIPPED | OUTPATIENT
Start: 2024-12-19

## 2024-12-19 NOTE — TELEPHONE ENCOUNTER
*We only ordered this for 30 days with no refills in November.  I did not add any refills.      Clary Harris is calling to request a refill on the following medication(s):    Medication Request:  Requested Prescriptions     Pending Prescriptions Disp Refills    furosemide (LASIX) 20 MG tablet [Pharmacy Med Name: FUROSEMIDE 20 MG TABLET] 30 tablet 0     Sig: TAKE 1 TABLET BY MOUTH DAILY       Last Visit Date (If Applicable):  Visit date not found    Next Visit Date:    Visit date not found

## 2024-12-19 NOTE — TELEPHONE ENCOUNTER
Clary Harris is calling to request a refill on the following medication(s):    Medication Request:  Requested Prescriptions     Pending Prescriptions Disp Refills    omeprazole (PRILOSEC) 20 MG delayed release capsule [Pharmacy Med Name: OMEPRAZOLE DR CAPS 20MG] 90 capsule 3     Sig: TAKE 1 CAPSULE DAILY       Last Visit Date (If Applicable):  12/5/2024    Next Visit Date:    1/22/2025

## 2024-12-23 LAB
MICROORGANISM SPEC CULT: NORMAL
MICROORGANISM/AGENT SPEC: NORMAL
SPECIMEN DESCRIPTION: NORMAL

## 2024-12-30 LAB
MICROORGANISM SPEC CULT: NORMAL
MICROORGANISM/AGENT SPEC: NORMAL
SPECIMEN DESCRIPTION: NORMAL

## 2025-01-02 ENCOUNTER — APPOINTMENT (OUTPATIENT)
Dept: CT IMAGING | Age: 80
DRG: 194 | End: 2025-01-02
Payer: MEDICARE

## 2025-01-02 ENCOUNTER — HOSPITAL ENCOUNTER (INPATIENT)
Age: 80
LOS: 3 days | Discharge: HOME | DRG: 194 | End: 2025-01-05
Attending: STUDENT IN AN ORGANIZED HEALTH CARE EDUCATION/TRAINING PROGRAM | Admitting: FAMILY MEDICINE
Payer: MEDICARE

## 2025-01-02 DIAGNOSIS — J18.9 PNEUMONIA OF BOTH LUNGS DUE TO INFECTIOUS ORGANISM, UNSPECIFIED PART OF LUNG: Primary | ICD-10-CM

## 2025-01-02 LAB
ALBUMIN SERPL-MCNC: 3.7 G/DL (ref 3.5–5.2)
ALBUMIN/GLOB SERPL: 0.9 {RATIO} (ref 1–2.5)
ALP SERPL-CCNC: 97 U/L (ref 35–104)
ALT SERPL-CCNC: 13 U/L (ref 5–33)
ANION GAP SERPL CALCULATED.3IONS-SCNC: 11 MMOL/L (ref 9–17)
AST SERPL-CCNC: 21 U/L
B PARAP IS1001 DNA NPH QL NAA+NON-PROBE: NOT DETECTED
B PERT DNA SPEC QL NAA+PROBE: NOT DETECTED
BASOPHILS # BLD: 0 K/UL (ref 0–0.2)
BASOPHILS NFR BLD: 0 % (ref 0–2)
BILIRUB SERPL-MCNC: 0.6 MG/DL (ref 0.3–1.2)
BUN SERPL-MCNC: 12 MG/DL (ref 8–23)
C PNEUM DNA NPH QL NAA+NON-PROBE: NOT DETECTED
CALCIUM SERPL-MCNC: 9.4 MG/DL (ref 8.6–10.4)
CHLORIDE SERPL-SCNC: 101 MMOL/L (ref 98–107)
CO2 SERPL-SCNC: 28 MMOL/L (ref 20–31)
CREAT SERPL-MCNC: 0.8 MG/DL (ref 0.5–0.9)
EOSINOPHIL # BLD: 0.1 K/UL (ref 0–0.4)
EOSINOPHILS RELATIVE PERCENT: 1 % (ref 1–4)
ERYTHROCYTE [DISTWIDTH] IN BLOOD BY AUTOMATED COUNT: 15.6 % (ref 12.5–15.4)
FLUAV AG SPEC QL: NEGATIVE
FLUAV RNA NPH QL NAA+NON-PROBE: NOT DETECTED
FLUBV AG SPEC QL: NEGATIVE
FLUBV RNA NPH QL NAA+NON-PROBE: NOT DETECTED
GFR, ESTIMATED: 75 ML/MIN/1.73M2
GLUCOSE SERPL-MCNC: 138 MG/DL (ref 70–99)
HADV DNA NPH QL NAA+NON-PROBE: NOT DETECTED
HCOV 229E RNA NPH QL NAA+NON-PROBE: NOT DETECTED
HCOV HKU1 RNA NPH QL NAA+NON-PROBE: NOT DETECTED
HCOV NL63 RNA NPH QL NAA+NON-PROBE: NOT DETECTED
HCOV OC43 RNA NPH QL NAA+NON-PROBE: NOT DETECTED
HCT VFR BLD AUTO: 38.2 % (ref 36–46)
HGB BLD-MCNC: 12.5 G/DL (ref 12–16)
HMPV RNA NPH QL NAA+NON-PROBE: NOT DETECTED
HPIV1 RNA NPH QL NAA+NON-PROBE: NOT DETECTED
HPIV2 RNA NPH QL NAA+NON-PROBE: NOT DETECTED
HPIV3 RNA NPH QL NAA+NON-PROBE: NOT DETECTED
HPIV4 RNA NPH QL NAA+NON-PROBE: NOT DETECTED
LACTATE BLDV-SCNC: 0.8 MMOL/L (ref 0.5–1.9)
LACTATE BLDV-SCNC: 1.3 MMOL/L (ref 0.5–1.9)
LYMPHOCYTES NFR BLD: 1.6 K/UL (ref 1–4.8)
LYMPHOCYTES RELATIVE PERCENT: 14 % (ref 24–44)
M PNEUMO DNA NPH QL NAA+NON-PROBE: NOT DETECTED
MCH RBC QN AUTO: 27.4 PG (ref 26–34)
MCHC RBC AUTO-ENTMCNC: 32.7 G/DL (ref 31–37)
MCV RBC AUTO: 83.6 FL (ref 80–100)
MONOCYTES NFR BLD: 1.1 K/UL (ref 0.1–1.2)
MONOCYTES NFR BLD: 9 % (ref 2–11)
NEUTROPHILS NFR BLD: 76 % (ref 36–66)
NEUTS SEG NFR BLD: 9.1 K/UL (ref 1.8–7.7)
PLATELET # BLD AUTO: 361 K/UL (ref 140–450)
PMV BLD AUTO: 7.4 FL (ref 6–12)
POTASSIUM SERPL-SCNC: 3.7 MMOL/L (ref 3.7–5.3)
PROT SERPL-MCNC: 7.6 G/DL (ref 6.4–8.3)
RBC # BLD AUTO: 4.57 M/UL (ref 4–5.2)
RSV RNA NPH QL NAA+NON-PROBE: NOT DETECTED
RV+EV RNA NPH QL NAA+NON-PROBE: NOT DETECTED
SARS-COV-2 RDRP RESP QL NAA+PROBE: NOT DETECTED
SARS-COV-2 RNA NPH QL NAA+NON-PROBE: NOT DETECTED
SODIUM SERPL-SCNC: 140 MMOL/L (ref 135–144)
SPECIMEN DESCRIPTION: NORMAL
SPECIMEN DESCRIPTION: NORMAL
WBC OTHER # BLD: 11.9 K/UL (ref 3.5–11)

## 2025-01-02 PROCEDURE — 2580000003 HC RX 258: Performed by: PHYSICIAN ASSISTANT

## 2025-01-02 PROCEDURE — 87635 SARS-COV-2 COVID-19 AMP PRB: CPT

## 2025-01-02 PROCEDURE — 87040 BLOOD CULTURE FOR BACTERIA: CPT

## 2025-01-02 PROCEDURE — 85025 COMPLETE CBC W/AUTO DIFF WBC: CPT

## 2025-01-02 PROCEDURE — 99285 EMERGENCY DEPT VISIT HI MDM: CPT

## 2025-01-02 PROCEDURE — 83605 ASSAY OF LACTIC ACID: CPT

## 2025-01-02 PROCEDURE — 6370000000 HC RX 637 (ALT 250 FOR IP): Performed by: PHYSICIAN ASSISTANT

## 2025-01-02 PROCEDURE — 94640 AIRWAY INHALATION TREATMENT: CPT

## 2025-01-02 PROCEDURE — 6360000002 HC RX W HCPCS: Performed by: PHYSICIAN ASSISTANT

## 2025-01-02 PROCEDURE — 2500000003 HC RX 250 WO HCPCS: Performed by: FAMILY MEDICINE

## 2025-01-02 PROCEDURE — 80053 COMPREHEN METABOLIC PANEL: CPT

## 2025-01-02 PROCEDURE — 87804 INFLUENZA ASSAY W/OPTIC: CPT

## 2025-01-02 PROCEDURE — 6370000000 HC RX 637 (ALT 250 FOR IP): Performed by: FAMILY MEDICINE

## 2025-01-02 PROCEDURE — 2500000003 HC RX 250 WO HCPCS: Performed by: PHYSICIAN ASSISTANT

## 2025-01-02 PROCEDURE — 2060000000 HC ICU INTERMEDIATE R&B

## 2025-01-02 PROCEDURE — 6360000004 HC RX CONTRAST MEDICATION: Performed by: STUDENT IN AN ORGANIZED HEALTH CARE EDUCATION/TRAINING PROGRAM

## 2025-01-02 PROCEDURE — 71260 CT THORAX DX C+: CPT

## 2025-01-02 PROCEDURE — 0202U NFCT DS 22 TRGT SARS-COV-2: CPT

## 2025-01-02 PROCEDURE — 36415 COLL VENOUS BLD VENIPUNCTURE: CPT

## 2025-01-02 PROCEDURE — 2500000003 HC RX 250 WO HCPCS: Performed by: STUDENT IN AN ORGANIZED HEALTH CARE EDUCATION/TRAINING PROGRAM

## 2025-01-02 RX ORDER — ROSUVASTATIN CALCIUM 20 MG/1
20 TABLET, COATED ORAL DAILY
Status: DISCONTINUED | OUTPATIENT
Start: 2025-01-02 | End: 2025-01-05 | Stop reason: HOSPADM

## 2025-01-02 RX ORDER — LEVOTHYROXINE SODIUM 50 UG/1
100 TABLET ORAL DAILY
Status: DISCONTINUED | OUTPATIENT
Start: 2025-01-03 | End: 2025-01-05 | Stop reason: HOSPADM

## 2025-01-02 RX ORDER — TRAZODONE HYDROCHLORIDE 100 MG/1
100 TABLET ORAL NIGHTLY
Status: DISCONTINUED | OUTPATIENT
Start: 2025-01-02 | End: 2025-01-05 | Stop reason: HOSPADM

## 2025-01-02 RX ORDER — POTASSIUM CHLORIDE 7.45 MG/ML
10 INJECTION INTRAVENOUS PRN
Status: DISCONTINUED | OUTPATIENT
Start: 2025-01-02 | End: 2025-01-05 | Stop reason: HOSPADM

## 2025-01-02 RX ORDER — SODIUM CHLORIDE 9 MG/ML
INJECTION, SOLUTION INTRAVENOUS PRN
Status: DISCONTINUED | OUTPATIENT
Start: 2025-01-02 | End: 2025-01-05 | Stop reason: HOSPADM

## 2025-01-02 RX ORDER — ONDANSETRON 2 MG/ML
4 INJECTION INTRAMUSCULAR; INTRAVENOUS EVERY 6 HOURS PRN
Status: DISCONTINUED | OUTPATIENT
Start: 2025-01-02 | End: 2025-01-05 | Stop reason: HOSPADM

## 2025-01-02 RX ORDER — CITALOPRAM HYDROBROMIDE 20 MG/1
20 TABLET ORAL DAILY
Status: DISCONTINUED | OUTPATIENT
Start: 2025-01-03 | End: 2025-01-05 | Stop reason: HOSPADM

## 2025-01-02 RX ORDER — AMLODIPINE BESYLATE 5 MG/1
5 TABLET ORAL DAILY
Status: DISCONTINUED | OUTPATIENT
Start: 2025-01-03 | End: 2025-01-05 | Stop reason: HOSPADM

## 2025-01-02 RX ORDER — CARVEDILOL 3.12 MG/1
6.25 TABLET ORAL 2 TIMES DAILY WITH MEALS
Status: DISCONTINUED | OUTPATIENT
Start: 2025-01-03 | End: 2025-01-05 | Stop reason: HOSPADM

## 2025-01-02 RX ORDER — 0.9 % SODIUM CHLORIDE 0.9 %
80 INTRAVENOUS SOLUTION INTRAVENOUS ONCE
Status: DISCONTINUED | OUTPATIENT
Start: 2025-01-02 | End: 2025-01-05 | Stop reason: HOSPADM

## 2025-01-02 RX ORDER — SODIUM CHLORIDE 0.9 % (FLUSH) 0.9 %
5-40 SYRINGE (ML) INJECTION EVERY 12 HOURS SCHEDULED
Status: DISCONTINUED | OUTPATIENT
Start: 2025-01-02 | End: 2025-01-05 | Stop reason: HOSPADM

## 2025-01-02 RX ORDER — ENOXAPARIN SODIUM 100 MG/ML
40 INJECTION SUBCUTANEOUS DAILY
Status: DISCONTINUED | OUTPATIENT
Start: 2025-01-03 | End: 2025-01-05 | Stop reason: HOSPADM

## 2025-01-02 RX ORDER — ONDANSETRON 4 MG/1
4 TABLET, ORALLY DISINTEGRATING ORAL EVERY 8 HOURS PRN
Status: DISCONTINUED | OUTPATIENT
Start: 2025-01-02 | End: 2025-01-05 | Stop reason: HOSPADM

## 2025-01-02 RX ORDER — IPRATROPIUM BROMIDE AND ALBUTEROL SULFATE 2.5; .5 MG/3ML; MG/3ML
1 SOLUTION RESPIRATORY (INHALATION) ONCE
Status: COMPLETED | OUTPATIENT
Start: 2025-01-02 | End: 2025-01-02

## 2025-01-02 RX ORDER — ACETAMINOPHEN 325 MG/1
650 TABLET ORAL EVERY 6 HOURS PRN
Status: DISCONTINUED | OUTPATIENT
Start: 2025-01-02 | End: 2025-01-05 | Stop reason: HOSPADM

## 2025-01-02 RX ORDER — MELOXICAM 7.5 MG/1
15 TABLET ORAL DAILY
Status: DISCONTINUED | OUTPATIENT
Start: 2025-01-03 | End: 2025-01-05 | Stop reason: HOSPADM

## 2025-01-02 RX ORDER — IOPAMIDOL 755 MG/ML
75 INJECTION, SOLUTION INTRAVASCULAR
Status: COMPLETED | OUTPATIENT
Start: 2025-01-02 | End: 2025-01-02

## 2025-01-02 RX ORDER — MAGNESIUM SULFATE IN WATER 40 MG/ML
2000 INJECTION, SOLUTION INTRAVENOUS PRN
Status: DISCONTINUED | OUTPATIENT
Start: 2025-01-02 | End: 2025-01-05 | Stop reason: HOSPADM

## 2025-01-02 RX ORDER — SODIUM CHLORIDE 0.9 % (FLUSH) 0.9 %
5-40 SYRINGE (ML) INJECTION PRN
Status: DISCONTINUED | OUTPATIENT
Start: 2025-01-02 | End: 2025-01-05 | Stop reason: HOSPADM

## 2025-01-02 RX ORDER — SODIUM CHLORIDE 9 MG/ML
INJECTION, SOLUTION INTRAVENOUS CONTINUOUS
Status: DISCONTINUED | OUTPATIENT
Start: 2025-01-02 | End: 2025-01-02

## 2025-01-02 RX ORDER — FUROSEMIDE 20 MG/1
20 TABLET ORAL DAILY
Status: DISCONTINUED | OUTPATIENT
Start: 2025-01-03 | End: 2025-01-05 | Stop reason: HOSPADM

## 2025-01-02 RX ORDER — PANTOPRAZOLE SODIUM 40 MG/1
40 TABLET, DELAYED RELEASE ORAL
Status: DISCONTINUED | OUTPATIENT
Start: 2025-01-03 | End: 2025-01-05 | Stop reason: HOSPADM

## 2025-01-02 RX ORDER — POTASSIUM CHLORIDE 1500 MG/1
40 TABLET, EXTENDED RELEASE ORAL PRN
Status: DISCONTINUED | OUTPATIENT
Start: 2025-01-02 | End: 2025-01-05 | Stop reason: HOSPADM

## 2025-01-02 RX ORDER — SODIUM CHLORIDE 0.9 % (FLUSH) 0.9 %
10 SYRINGE (ML) INJECTION PRN
Status: DISCONTINUED | OUTPATIENT
Start: 2025-01-02 | End: 2025-01-05 | Stop reason: HOSPADM

## 2025-01-02 RX ORDER — VALSARTAN 160 MG/1
320 TABLET ORAL DAILY
Status: DISCONTINUED | OUTPATIENT
Start: 2025-01-03 | End: 2025-01-05 | Stop reason: HOSPADM

## 2025-01-02 RX ORDER — POLYETHYLENE GLYCOL 3350 17 G/17G
17 POWDER, FOR SOLUTION ORAL DAILY PRN
Status: DISCONTINUED | OUTPATIENT
Start: 2025-01-02 | End: 2025-01-05 | Stop reason: HOSPADM

## 2025-01-02 RX ORDER — ACETAMINOPHEN 650 MG/1
650 SUPPOSITORY RECTAL EVERY 6 HOURS PRN
Status: DISCONTINUED | OUTPATIENT
Start: 2025-01-02 | End: 2025-01-05 | Stop reason: HOSPADM

## 2025-01-02 RX ADMIN — IOPAMIDOL 75 ML: 755 INJECTION, SOLUTION INTRAVENOUS at 17:34

## 2025-01-02 RX ADMIN — SODIUM CHLORIDE: 9 INJECTION, SOLUTION INTRAVENOUS at 21:43

## 2025-01-02 RX ADMIN — SODIUM CHLORIDE, PRESERVATIVE FREE 10 ML: 5 INJECTION INTRAVENOUS at 23:17

## 2025-01-02 RX ADMIN — WATER 1000 MG: 1 INJECTION INTRAMUSCULAR; INTRAVENOUS; SUBCUTANEOUS at 21:47

## 2025-01-02 RX ADMIN — IPRATROPIUM BROMIDE AND ALBUTEROL SULFATE 1 DOSE: .5; 2.5 SOLUTION RESPIRATORY (INHALATION) at 20:58

## 2025-01-02 RX ADMIN — ROSUVASTATIN 20 MG: 20 TABLET, FILM COATED ORAL at 23:17

## 2025-01-02 RX ADMIN — Medication 80 ML: at 17:34

## 2025-01-02 RX ADMIN — TRAZODONE HYDROCHLORIDE 100 MG: 100 TABLET ORAL at 23:17

## 2025-01-02 RX ADMIN — AZITHROMYCIN MONOHYDRATE 500 MG: 500 INJECTION, POWDER, LYOPHILIZED, FOR SOLUTION INTRAVENOUS at 21:51

## 2025-01-02 RX ADMIN — SODIUM CHLORIDE, PRESERVATIVE FREE 10 ML: 5 INJECTION INTRAVENOUS at 17:34

## 2025-01-02 ASSESSMENT — PAIN SCALES - GENERAL
PAINLEVEL_OUTOF10: 0
PAINLEVEL_OUTOF10: 0
PAINLEVEL_OUTOF10: 4
PAINLEVEL_OUTOF10: 0

## 2025-01-02 ASSESSMENT — PAIN DESCRIPTION - LOCATION: LOCATION: CHEST;BACK

## 2025-01-02 ASSESSMENT — PAIN DESCRIPTION - DESCRIPTORS: DESCRIPTORS: ACHING

## 2025-01-02 ASSESSMENT — PAIN - FUNCTIONAL ASSESSMENT: PAIN_FUNCTIONAL_ASSESSMENT: 0-10

## 2025-01-02 ASSESSMENT — PAIN DESCRIPTION - PAIN TYPE: TYPE: ACUTE PAIN

## 2025-01-03 LAB
ALBUMIN SERPL-MCNC: 3.3 G/DL (ref 3.5–5.2)
ALBUMIN/GLOB SERPL: 1 {RATIO} (ref 1–2.5)
ALP SERPL-CCNC: 90 U/L (ref 35–104)
ALT SERPL-CCNC: 14 U/L (ref 5–33)
ANION GAP SERPL CALCULATED.3IONS-SCNC: 10 MMOL/L (ref 9–17)
AST SERPL-CCNC: 21 U/L
BASOPHILS # BLD: 0.1 K/UL (ref 0–0.2)
BASOPHILS NFR BLD: 1 % (ref 0–2)
BILIRUB SERPL-MCNC: 0.4 MG/DL (ref 0.3–1.2)
BUN SERPL-MCNC: 13 MG/DL (ref 8–23)
CALCIUM SERPL-MCNC: 8.9 MG/DL (ref 8.6–10.4)
CHLORIDE SERPL-SCNC: 103 MMOL/L (ref 98–107)
CO2 SERPL-SCNC: 28 MMOL/L (ref 20–31)
CREAT SERPL-MCNC: 0.6 MG/DL (ref 0.5–0.9)
EOSINOPHIL # BLD: 0.2 K/UL (ref 0–0.4)
EOSINOPHILS RELATIVE PERCENT: 2 % (ref 1–4)
ERYTHROCYTE [DISTWIDTH] IN BLOOD BY AUTOMATED COUNT: 15.8 % (ref 12.5–15.4)
GFR, ESTIMATED: >90 ML/MIN/1.73M2
GLUCOSE BLD-MCNC: 207 MG/DL (ref 65–105)
GLUCOSE BLD-MCNC: 212 MG/DL (ref 65–105)
GLUCOSE BLD-MCNC: 280 MG/DL (ref 65–105)
GLUCOSE SERPL-MCNC: 106 MG/DL (ref 70–99)
HCT VFR BLD AUTO: 33.4 % (ref 36–46)
HGB BLD-MCNC: 10.9 G/DL (ref 12–16)
L PNEUMO1 AG UR QL IA.RAPID: NEGATIVE
LYMPHOCYTES NFR BLD: 1.8 K/UL (ref 1–4.8)
LYMPHOCYTES RELATIVE PERCENT: 16 % (ref 24–44)
MCH RBC QN AUTO: 27.4 PG (ref 26–34)
MCHC RBC AUTO-ENTMCNC: 32.7 G/DL (ref 31–37)
MCV RBC AUTO: 84 FL (ref 80–100)
MONOCYTES NFR BLD: 1.1 K/UL (ref 0.1–1.2)
MONOCYTES NFR BLD: 9 % (ref 2–11)
NEUTROPHILS NFR BLD: 72 % (ref 36–66)
NEUTS SEG NFR BLD: 8.5 K/UL (ref 1.8–7.7)
PLATELET # BLD AUTO: 323 K/UL (ref 140–450)
PMV BLD AUTO: 7.2 FL (ref 6–12)
POTASSIUM SERPL-SCNC: 4 MMOL/L (ref 3.7–5.3)
PROT SERPL-MCNC: 6.7 G/DL (ref 6.4–8.3)
RBC # BLD AUTO: 3.98 M/UL (ref 4–5.2)
S PNEUM AG SPEC QL: POSITIVE
SODIUM SERPL-SCNC: 141 MMOL/L (ref 135–144)
SPECIMEN SOURCE: NORMAL
WBC OTHER # BLD: 11.7 K/UL (ref 3.5–11)

## 2025-01-03 PROCEDURE — 36415 COLL VENOUS BLD VENIPUNCTURE: CPT

## 2025-01-03 PROCEDURE — 80053 COMPREHEN METABOLIC PANEL: CPT

## 2025-01-03 PROCEDURE — 2500000003 HC RX 250 WO HCPCS: Performed by: FAMILY MEDICINE

## 2025-01-03 PROCEDURE — 2060000000 HC ICU INTERMEDIATE R&B

## 2025-01-03 PROCEDURE — 87899 AGENT NOS ASSAY W/OPTIC: CPT

## 2025-01-03 PROCEDURE — 87449 NOS EACH ORGANISM AG IA: CPT

## 2025-01-03 PROCEDURE — 6370000000 HC RX 637 (ALT 250 FOR IP): Performed by: FAMILY MEDICINE

## 2025-01-03 PROCEDURE — 99222 1ST HOSP IP/OBS MODERATE 55: CPT | Performed by: FAMILY MEDICINE

## 2025-01-03 PROCEDURE — 6370000000 HC RX 637 (ALT 250 FOR IP): Performed by: INTERNAL MEDICINE

## 2025-01-03 PROCEDURE — 2580000003 HC RX 258: Performed by: FAMILY MEDICINE

## 2025-01-03 PROCEDURE — 87641 MR-STAPH DNA AMP PROBE: CPT

## 2025-01-03 PROCEDURE — 94761 N-INVAS EAR/PLS OXIMETRY MLT: CPT

## 2025-01-03 PROCEDURE — 6360000002 HC RX W HCPCS: Performed by: FAMILY MEDICINE

## 2025-01-03 PROCEDURE — 2700000000 HC OXYGEN THERAPY PER DAY

## 2025-01-03 PROCEDURE — 94640 AIRWAY INHALATION TREATMENT: CPT

## 2025-01-03 PROCEDURE — 82947 ASSAY GLUCOSE BLOOD QUANT: CPT

## 2025-01-03 PROCEDURE — 86738 MYCOPLASMA ANTIBODY: CPT

## 2025-01-03 PROCEDURE — 85025 COMPLETE CBC W/AUTO DIFF WBC: CPT

## 2025-01-03 PROCEDURE — 6360000002 HC RX W HCPCS: Performed by: INTERNAL MEDICINE

## 2025-01-03 RX ORDER — IPRATROPIUM BROMIDE AND ALBUTEROL SULFATE 2.5; .5 MG/3ML; MG/3ML
1 SOLUTION RESPIRATORY (INHALATION)
Status: DISCONTINUED | OUTPATIENT
Start: 2025-01-03 | End: 2025-01-05 | Stop reason: HOSPADM

## 2025-01-03 RX ORDER — INSULIN LISPRO 100 [IU]/ML
0-4 INJECTION, SOLUTION INTRAVENOUS; SUBCUTANEOUS
Status: DISCONTINUED | OUTPATIENT
Start: 2025-01-03 | End: 2025-01-05 | Stop reason: HOSPADM

## 2025-01-03 RX ORDER — DEXTROSE MONOHYDRATE 100 MG/ML
INJECTION, SOLUTION INTRAVENOUS CONTINUOUS PRN
Status: DISCONTINUED | OUTPATIENT
Start: 2025-01-03 | End: 2025-01-05 | Stop reason: HOSPADM

## 2025-01-03 RX ORDER — GLUCAGON 1 MG/ML
1 KIT INJECTION PRN
Status: DISCONTINUED | OUTPATIENT
Start: 2025-01-03 | End: 2025-01-05 | Stop reason: HOSPADM

## 2025-01-03 RX ORDER — BENZONATATE 100 MG/1
200 CAPSULE ORAL 3 TIMES DAILY PRN
Status: DISCONTINUED | OUTPATIENT
Start: 2025-01-03 | End: 2025-01-05 | Stop reason: HOSPADM

## 2025-01-03 RX ADMIN — INSULIN LISPRO 1 UNITS: 100 INJECTION, SOLUTION INTRAVENOUS; SUBCUTANEOUS at 17:37

## 2025-01-03 RX ADMIN — VALSARTAN 320 MG: 160 TABLET, FILM COATED ORAL at 09:22

## 2025-01-03 RX ADMIN — LEVOTHYROXINE SODIUM 100 MCG: 0.05 TABLET ORAL at 06:13

## 2025-01-03 RX ADMIN — ENOXAPARIN SODIUM 40 MG: 100 INJECTION SUBCUTANEOUS at 09:23

## 2025-01-03 RX ADMIN — CITALOPRAM HYDROBROMIDE 20 MG: 20 TABLET ORAL at 09:22

## 2025-01-03 RX ADMIN — METHYLPREDNISOLONE SODIUM SUCCINATE 40 MG: 40 INJECTION, POWDER, FOR SOLUTION INTRAMUSCULAR; INTRAVENOUS at 11:22

## 2025-01-03 RX ADMIN — PANTOPRAZOLE SODIUM 40 MG: 40 TABLET, DELAYED RELEASE ORAL at 06:13

## 2025-01-03 RX ADMIN — SODIUM CHLORIDE, PRESERVATIVE FREE 10 ML: 5 INJECTION INTRAVENOUS at 09:27

## 2025-01-03 RX ADMIN — SODIUM CHLORIDE, PRESERVATIVE FREE 10 ML: 5 INJECTION INTRAVENOUS at 21:56

## 2025-01-03 RX ADMIN — INSULIN LISPRO 1 UNITS: 100 INJECTION, SOLUTION INTRAVENOUS; SUBCUTANEOUS at 12:46

## 2025-01-03 RX ADMIN — INSULIN LISPRO 2 UNITS: 100 INJECTION, SOLUTION INTRAVENOUS; SUBCUTANEOUS at 21:55

## 2025-01-03 RX ADMIN — FUROSEMIDE 20 MG: 20 TABLET ORAL at 09:21

## 2025-01-03 RX ADMIN — CARVEDILOL 6.25 MG: 3.12 TABLET, FILM COATED ORAL at 17:38

## 2025-01-03 RX ADMIN — METHYLPREDNISOLONE SODIUM SUCCINATE 40 MG: 40 INJECTION, POWDER, FOR SOLUTION INTRAMUSCULAR; INTRAVENOUS at 21:59

## 2025-01-03 RX ADMIN — IPRATROPIUM BROMIDE AND ALBUTEROL SULFATE 1 DOSE: 2.5; .5 SOLUTION RESPIRATORY (INHALATION) at 10:52

## 2025-01-03 RX ADMIN — MELOXICAM 15 MG: 7.5 TABLET ORAL at 09:21

## 2025-01-03 RX ADMIN — WATER 1000 MG: 1 INJECTION INTRAMUSCULAR; INTRAVENOUS; SUBCUTANEOUS at 22:02

## 2025-01-03 RX ADMIN — TRAZODONE HYDROCHLORIDE 100 MG: 100 TABLET ORAL at 22:01

## 2025-01-03 RX ADMIN — CARVEDILOL 6.25 MG: 3.12 TABLET, FILM COATED ORAL at 09:21

## 2025-01-03 RX ADMIN — AZITHROMYCIN MONOHYDRATE 500 MG: 500 INJECTION, POWDER, LYOPHILIZED, FOR SOLUTION INTRAVENOUS at 22:11

## 2025-01-03 RX ADMIN — AMLODIPINE BESYLATE 5 MG: 5 TABLET ORAL at 09:22

## 2025-01-03 RX ADMIN — ROSUVASTATIN 20 MG: 20 TABLET, FILM COATED ORAL at 22:01

## 2025-01-03 RX ADMIN — IPRATROPIUM BROMIDE AND ALBUTEROL SULFATE 1 DOSE: 2.5; .5 SOLUTION RESPIRATORY (INHALATION) at 19:44

## 2025-01-03 RX ADMIN — IPRATROPIUM BROMIDE AND ALBUTEROL SULFATE 1 DOSE: 2.5; .5 SOLUTION RESPIRATORY (INHALATION) at 16:07

## 2025-01-03 ASSESSMENT — PAIN SCALES - GENERAL: PAINLEVEL_OUTOF10: 0

## 2025-01-03 NOTE — ED PROVIDER NOTES
EMERGENCY DEPARTMENT ENCOUNTER    Pt Name: Clary Harris  MRN: [id number]  Birthdate 1945  Date of evaluation: 1/2/25  CHIEF COMPLAINT       Chief Complaint   Patient presents with    Cough     Cough, low grade temp, shortness of breath.  Recent admission for pneumonia.      HISTORY OF PRESENT ILLNESS   Patient is a 79-year-old female who presents with a cough and fever.  Patient states that symptoms started about 4 days ago.  She was at Dr. Escalante's office that day to follow-up after being hospitalized for pneumonia about a month ago.  She states that the provider that saw her felt that she was getting a viral illness and told her to start taking Mucinex which she did but now she has begun to feel wheezy, reports a subjective fever at home this morning.  She does not use a nebulizer at home.             REVIEW OF SYSTEMS     Review of Systems   Constitutional:  Positive for fever.   Respiratory:  Positive for cough and shortness of breath.      PASTMEDICAL HISTORY     Past Medical History:   Diagnosis Date    COPD (chronic obstructive pulmonary disease) ([facility])     Diverticulosis     DJD (degenerative joint disease)     right knee    Hypertension     Hypertriglyceridemia     Hypothyroidism     Mild valvular heart disease     Osteoarthritis     Sleep apnea     Type 2 diabetes mellitus without complication (HCC)     Vertigo      Past Problem List  Patient Active Problem List   Diagnosis Code    COPD (chronic obstructive pulmonary disease) ([facility]) J44.9    Diverticulosis K57.90    DJD (degenerative joint disease) M19.90    Hypertension I10    Hypertriglyceridemia E78.1    Hypothyroidism E03.9    Mild valvular heart disease I38    Obstructive sleep apnea G47.33    Type 2 diabetes mellitus without complication ([facility]) E11.9    Vertigo R42    Major depressive disorder, recurrent, mild F33.0    Major depressive disorder, recurrent, moderate F33.1    Major depressive disorder, recurrent, unspecified F33.9    Pneumonia 
packs/day for 26.0 years (13.0 ttl pk-yrs)     Types: Cigarettes     Start date:      Quit date:      Years since quittin.0    Smokeless tobacco: Never   Vaping Use    Vaping status: Never Used   Substance Use Topics    Alcohol use: Yes     Comment: social    Drug use: Never       I performed a history and physical examination of the patient and discussed management with the mid level provideer. I reviewed the mid level provider's note and agree with the documented findings and plan of care. Any areas of disagreement are noted on the chart. I was personally present for the key portions of any procedures. I have documented in the chart those procedures where I was not present during the key portions. I have reviewed the emergency nurses triage note. I agree with the chief complaint, past medical history, past surgical history, allergies, medications, social and family history as documented unless otherwise noted below. Documentation of the HPI, Physical Exam and Medical Decision Making performed by mid level providers is based on my personal performance of the HPI, PE and MDM. For Physician Assistant/ Nurse Practitioner cases/documentation I have personally evaluated this patient and have completed at least one if not all key elements of the E/M (history, physical exam, and MDM). Additional findings are as noted.    Bertrand Moraes MD  Attending Emergency Physician        Bertrand Moraes MD  [phone]

## 2025-01-03 NOTE — H&P
[facility] Medicine   IN-PATIENT [facility]    HISTORY AND PHYSICAL EXAMINATION            Date:   1/3/2025  Patient name:  Clary Harris  Date of admission:  1/2/2025  4:11 PM  MRN:   [id number]  Account:  [id number]  YOB: 1945  PCP:    Kandi Thompson DO  Room:   [address] [place] [address]  Code Status:    Full Code      History Obtained From:     patient, spouse    History of Present Illness:     lCary Harris is a 79 y.o. Non- / non  female who presents with Cough (Cough, low grade temp, shortness of breath.  Recent admission for pneumonia. )   and is admitted to the hospital for the management of Community acquired pneumonia of right upper lobe of lung.    Last month, patient was admitted to hospital due to right loculated pleural effusion that required prolonged chest tubes.  She was able to be discharged home after chest tubes were removed, and she did not require any oxygen.  She saw pulmonology five days ago who stated she was doing well but had some slight wheezing, so it was recommended that she start mucinex.  Over the next couple of days, patient developed productive cough, nasal congestion, chills, and low grade fever of .  She developed worsening TORRES with her oxygen dropping into the 80s when walking.  She went to ED and was found to have multifocal pneumonia.  She required 2-4L of oxygen.  She was started on azithromycin and rocephin.  Her respiratory panel is negative.  This morning, she has continued to have a dry cough and is requiring 4L of oxygen.    Past Medical History:     Past Medical History:   Diagnosis Date    COPD (chronic obstructive pulmonary disease) (HCC)     Diverticulosis     DJD (degenerative joint disease)     right knee    Hypertension     Hypertriglyceridemia     Hypothyroidism     Mild valvular heart disease     Osteoarthritis     Sleep apnea     Type 2 diabetes mellitus without complication (HCC)     Vertigo

## 2025-01-03 NOTE — CONSULTS
[facility] PULMONARY & CRITICAL CARE SPECIALISTS  Jose Ramon Oh MD/Lopez ROJAS AGACNP-BC, NP-C      Elisabet ROJAS NP-C      Bruno ROJAS NP-C     Pulmonary and Critical Care CONSULT NOTE:      DATE OF CONSULT 1/3/2025    REASON FOR CONSULTATION:  Pneumonia      PCP Kandi Thompson DO     CHIEF COMPLAINT: Cough, wheezing and increased shortness of breath    HISTORY OF PRESENT ILLNESS:   This a very pleasant 79-year-old female that I had taken care of a couple of months ago here at [facility].  She has rather severe multilobar pneumonia with complex loculated parapneumonic effusion requiring chest tube and intrapleural tPA/dornase.  The pleural fluid did respond and she actually followed up in our office last week.  She saw our nurse practitioner.  She had stopped using the Breztri.  She was doing well for well over a month but over the last 1 week start developing increasing cough and some increased dyspnea on exertion with some wheezing.  She did a pulmonary function test in our office that showed moderate restrictive ventilatory defect without airflow obstruction.    She presented to hospital due to worsening shortness of breath and cough.  She is currently wearing 3 L of oxygen but is not normally on oxygen at home.  I do not see a room air documented oxygen saturation.  The first oxygen saturation I see documented was 92% on 3 L.    She denies pleurisy.  She denies hemoptysis.  She denies any recent sick contacts.    Please refer to our previous notes for further details.  I did review the outpatient records from our office.      ALLERGIES:  Allergies   Allergen Reactions    Hydrochlorothiazide     Sulfa Antibiotics Hives, Other (See Comments) and Itching       HOME MEDICATIONS:  Medications Prior to Admission: omeprazole (PRILOSEC) 20 MG delayed release capsule, TAKE 1 CAPSULE DAILY  furosemide (LASIX) 20 MG tablet, TAKE 1 TABLET BY MOUTH

## 2025-01-04 LAB
GLUCOSE BLD-MCNC: 199 MG/DL (ref 65–105)
GLUCOSE BLD-MCNC: 223 MG/DL (ref 65–105)
GLUCOSE BLD-MCNC: 271 MG/DL (ref 65–105)
GLUCOSE BLD-MCNC: 340 MG/DL (ref 65–105)
MRSA, DNA, NASAL: NEGATIVE
SPECIMEN DESCRIPTION: NORMAL

## 2025-01-04 PROCEDURE — 2700000000 HC OXYGEN THERAPY PER DAY

## 2025-01-04 PROCEDURE — 6360000002 HC RX W HCPCS: Performed by: INTERNAL MEDICINE

## 2025-01-04 PROCEDURE — 6370000000 HC RX 637 (ALT 250 FOR IP): Performed by: FAMILY MEDICINE

## 2025-01-04 PROCEDURE — 99232 SBSQ HOSP IP/OBS MODERATE 35: CPT | Performed by: FAMILY MEDICINE

## 2025-01-04 PROCEDURE — 94761 N-INVAS EAR/PLS OXIMETRY MLT: CPT

## 2025-01-04 PROCEDURE — 2060000000 HC ICU INTERMEDIATE R&B

## 2025-01-04 PROCEDURE — 6370000000 HC RX 637 (ALT 250 FOR IP): Performed by: INTERNAL MEDICINE

## 2025-01-04 PROCEDURE — 6360000002 HC RX W HCPCS: Performed by: FAMILY MEDICINE

## 2025-01-04 PROCEDURE — 94640 AIRWAY INHALATION TREATMENT: CPT

## 2025-01-04 PROCEDURE — 2580000003 HC RX 258: Performed by: FAMILY MEDICINE

## 2025-01-04 PROCEDURE — 82947 ASSAY GLUCOSE BLOOD QUANT: CPT

## 2025-01-04 PROCEDURE — 2500000003 HC RX 250 WO HCPCS: Performed by: FAMILY MEDICINE

## 2025-01-04 RX ORDER — ALBUTEROL SULFATE 0.83 MG/ML
2.5 SOLUTION RESPIRATORY (INHALATION) EVERY 4 HOURS PRN
Status: DISCONTINUED | OUTPATIENT
Start: 2025-01-04 | End: 2025-01-05 | Stop reason: HOSPADM

## 2025-01-04 RX ORDER — PREDNISONE 20 MG/1
20 TABLET ORAL DAILY
Status: DISCONTINUED | OUTPATIENT
Start: 2025-01-05 | End: 2025-01-05 | Stop reason: HOSPADM

## 2025-01-04 RX ADMIN — ENOXAPARIN SODIUM 40 MG: 100 INJECTION SUBCUTANEOUS at 09:04

## 2025-01-04 RX ADMIN — SODIUM CHLORIDE, PRESERVATIVE FREE 10 ML: 5 INJECTION INTRAVENOUS at 09:08

## 2025-01-04 RX ADMIN — IPRATROPIUM BROMIDE AND ALBUTEROL SULFATE 1 DOSE: 2.5; .5 SOLUTION RESPIRATORY (INHALATION) at 20:41

## 2025-01-04 RX ADMIN — CITALOPRAM HYDROBROMIDE 20 MG: 20 TABLET ORAL at 09:05

## 2025-01-04 RX ADMIN — PANTOPRAZOLE SODIUM 40 MG: 40 TABLET, DELAYED RELEASE ORAL at 06:03

## 2025-01-04 RX ADMIN — CARVEDILOL 6.25 MG: 3.12 TABLET, FILM COATED ORAL at 09:04

## 2025-01-04 RX ADMIN — SODIUM CHLORIDE, PRESERVATIVE FREE 10 ML: 5 INJECTION INTRAVENOUS at 21:35

## 2025-01-04 RX ADMIN — METHYLPREDNISOLONE SODIUM SUCCINATE 40 MG: 40 INJECTION, POWDER, FOR SOLUTION INTRAMUSCULAR; INTRAVENOUS at 06:03

## 2025-01-04 RX ADMIN — ROSUVASTATIN 20 MG: 20 TABLET, FILM COATED ORAL at 21:28

## 2025-01-04 RX ADMIN — IPRATROPIUM BROMIDE AND ALBUTEROL SULFATE 1 DOSE: 2.5; .5 SOLUTION RESPIRATORY (INHALATION) at 15:27

## 2025-01-04 RX ADMIN — AMLODIPINE BESYLATE 5 MG: 5 TABLET ORAL at 09:05

## 2025-01-04 RX ADMIN — INSULIN LISPRO 3 UNITS: 100 INJECTION, SOLUTION INTRAVENOUS; SUBCUTANEOUS at 12:43

## 2025-01-04 RX ADMIN — INSULIN LISPRO 1 UNITS: 100 INJECTION, SOLUTION INTRAVENOUS; SUBCUTANEOUS at 21:55

## 2025-01-04 RX ADMIN — IPRATROPIUM BROMIDE AND ALBUTEROL SULFATE 1 DOSE: 2.5; .5 SOLUTION RESPIRATORY (INHALATION) at 07:52

## 2025-01-04 RX ADMIN — VALSARTAN 320 MG: 160 TABLET, FILM COATED ORAL at 09:04

## 2025-01-04 RX ADMIN — LEVOTHYROXINE SODIUM 100 MCG: 0.05 TABLET ORAL at 06:03

## 2025-01-04 RX ADMIN — INSULIN LISPRO 2 UNITS: 100 INJECTION, SOLUTION INTRAVENOUS; SUBCUTANEOUS at 16:49

## 2025-01-04 RX ADMIN — AZITHROMYCIN MONOHYDRATE 500 MG: 500 INJECTION, POWDER, LYOPHILIZED, FOR SOLUTION INTRAVENOUS at 21:34

## 2025-01-04 RX ADMIN — METHYLPREDNISOLONE SODIUM SUCCINATE 40 MG: 40 INJECTION, POWDER, FOR SOLUTION INTRAMUSCULAR; INTRAVENOUS at 14:09

## 2025-01-04 RX ADMIN — WATER 1000 MG: 1 INJECTION INTRAMUSCULAR; INTRAVENOUS; SUBCUTANEOUS at 21:29

## 2025-01-04 RX ADMIN — FUROSEMIDE 20 MG: 20 TABLET ORAL at 09:05

## 2025-01-04 RX ADMIN — METHYLPREDNISOLONE SODIUM SUCCINATE 40 MG: 40 INJECTION, POWDER, FOR SOLUTION INTRAMUSCULAR; INTRAVENOUS at 21:28

## 2025-01-04 RX ADMIN — TRAZODONE HYDROCHLORIDE 100 MG: 100 TABLET ORAL at 21:28

## 2025-01-04 RX ADMIN — IPRATROPIUM BROMIDE AND ALBUTEROL SULFATE 1 DOSE: 2.5; .5 SOLUTION RESPIRATORY (INHALATION) at 11:40

## 2025-01-04 RX ADMIN — CARVEDILOL 6.25 MG: 3.12 TABLET, FILM COATED ORAL at 16:50

## 2025-01-04 RX ADMIN — MELOXICAM 15 MG: 7.5 TABLET ORAL at 09:04

## 2025-01-05 VITALS
SYSTOLIC BLOOD PRESSURE: 138 MMHG | RESPIRATION RATE: 16 BRPM | BODY MASS INDEX: 31.55 KG/M2 | HEIGHT: 65 IN | HEART RATE: 81 BPM | DIASTOLIC BLOOD PRESSURE: 98 MMHG | OXYGEN SATURATION: 91 % | TEMPERATURE: 98.2 F | WEIGHT: 189.38 LBS

## 2025-01-05 LAB
GLUCOSE BLD-MCNC: 163 MG/DL (ref 65–105)
GLUCOSE BLD-MCNC: 269 MG/DL (ref 65–105)

## 2025-01-05 PROCEDURE — 94761 N-INVAS EAR/PLS OXIMETRY MLT: CPT

## 2025-01-05 PROCEDURE — 6370000000 HC RX 637 (ALT 250 FOR IP): Performed by: FAMILY MEDICINE

## 2025-01-05 PROCEDURE — 6370000000 HC RX 637 (ALT 250 FOR IP): Performed by: INTERNAL MEDICINE

## 2025-01-05 PROCEDURE — 2700000000 HC OXYGEN THERAPY PER DAY

## 2025-01-05 PROCEDURE — 82947 ASSAY GLUCOSE BLOOD QUANT: CPT

## 2025-01-05 PROCEDURE — 99239 HOSP IP/OBS DSCHRG MGMT >30: CPT | Performed by: FAMILY MEDICINE

## 2025-01-05 PROCEDURE — 94640 AIRWAY INHALATION TREATMENT: CPT

## 2025-01-05 PROCEDURE — 94618 PULMONARY STRESS TESTING: CPT

## 2025-01-05 PROCEDURE — 6360000002 HC RX W HCPCS: Performed by: FAMILY MEDICINE

## 2025-01-05 RX ORDER — PREDNISONE 10 MG/1
TABLET ORAL
Qty: 16 TABLET | Refills: 0 | Status: SHIPPED | OUTPATIENT
Start: 2025-01-06

## 2025-01-05 RX ORDER — CEFUROXIME AXETIL 500 MG/1
500 TABLET ORAL 2 TIMES DAILY
Qty: 14 TABLET | Refills: 0 | Status: SHIPPED | OUTPATIENT
Start: 2025-01-05 | End: 2025-01-12

## 2025-01-05 RX ORDER — BENZONATATE 200 MG/1
200 CAPSULE ORAL 3 TIMES DAILY PRN
Qty: 30 CAPSULE | Refills: 1 | Status: SHIPPED | OUTPATIENT
Start: 2025-01-05 | End: 2025-01-12

## 2025-01-05 RX ADMIN — IPRATROPIUM BROMIDE AND ALBUTEROL SULFATE 1 DOSE: 2.5; .5 SOLUTION RESPIRATORY (INHALATION) at 08:38

## 2025-01-05 RX ADMIN — LEVOTHYROXINE SODIUM 100 MCG: 0.05 TABLET ORAL at 08:05

## 2025-01-05 RX ADMIN — FUROSEMIDE 20 MG: 20 TABLET ORAL at 10:04

## 2025-01-05 RX ADMIN — AMLODIPINE BESYLATE 5 MG: 5 TABLET ORAL at 10:03

## 2025-01-05 RX ADMIN — MELOXICAM 15 MG: 7.5 TABLET ORAL at 10:03

## 2025-01-05 RX ADMIN — PREDNISONE 20 MG: 20 TABLET ORAL at 10:04

## 2025-01-05 RX ADMIN — PANTOPRAZOLE SODIUM 40 MG: 40 TABLET, DELAYED RELEASE ORAL at 08:04

## 2025-01-05 RX ADMIN — VALSARTAN 320 MG: 160 TABLET, FILM COATED ORAL at 10:04

## 2025-01-05 RX ADMIN — CITALOPRAM HYDROBROMIDE 20 MG: 20 TABLET ORAL at 10:03

## 2025-01-05 RX ADMIN — IPRATROPIUM BROMIDE AND ALBUTEROL SULFATE 1 DOSE: 2.5; .5 SOLUTION RESPIRATORY (INHALATION) at 11:44

## 2025-01-05 RX ADMIN — INSULIN LISPRO 2 UNITS: 100 INJECTION, SOLUTION INTRAVENOUS; SUBCUTANEOUS at 12:52

## 2025-01-05 RX ADMIN — ENOXAPARIN SODIUM 40 MG: 100 INJECTION SUBCUTANEOUS at 10:04

## 2025-01-05 RX ADMIN — CARVEDILOL 6.25 MG: 3.12 TABLET, FILM COATED ORAL at 10:03

## 2025-01-05 NOTE — DISCHARGE SUMMARY
[facility] Family Medicine  IN-PATIENT SERVICE   [facility] - Location: [place]    Discharge Summary     Patient ID: Clary Harris  :  1945   MRN: [id number]     ACCOUNT:  [id number]   Patient's PCP: Kandi Thompson DO  Admit Date: 2025   Discharge Date: 2025  Length of Stay: 3  Code Status:  Full Code  Admitting Physician: Xiomara Palm MD  Discharge Physician: John Mock MD     Active Discharge Diagnoses:     Hospital Problem Lists:  Principal Problem:    Community acquired pneumonia of right upper lobe of lung  Resolved Problems:    * No resolved hospital problems. *  Multifocal pneumonia  Degenerative joint disease  Hypertension  Hyperlipidemia  Hypothyroidism  Mild valvular heart disease  Osteoarthritis  Diabetes mellitus type 2 without complication  History of sleep apnea    Admission Condition:  fair     Discharged Condition: good    Hospital Stay:     Hospital Course:  Clary Harris is a 79 y.o. female who was admitted for the management of Community acquired pneumonia of right upper lobe of lung , presented to ER with Cough (Cough, low grade temp, shortness of breath.  Recent admission for pneumonia. )    Earlier in December patient was hospitalized for multifocal pneumonia with pleural effusion loculations.  She had numerous chest tubes during her hospital stay and eventually recovered.  She was doing well until after holiday family get together she developed chest congestion and cough again.  She was seen in emergency room and imaging studies showed evidence of infiltrate in the right and left upper lobe and small right pleural effusion which has improved since previous studies.  Patient was admitted for further therapy including oxygen which she does not have at home as well as IV steroids and IV antibiotics.  She was seen in consultation by pulmonology    Significant therapeutic interventions: IV steroids IV antibiotics and aerosol treatments

## 2025-01-05 NOTE — CARE COORDINATION
CM spoke with patient about DME need for oxygen. Patient would like to use [employer]. CM faxed over face to face, H&P. order, o2 eval and MAR and face sheet to [facility] at [employer]. Hope stated okay to give oxygen tank from the hospital to patient.     1333 Oxygen taken given to patient and informed to call [employer] when she leaves the hospital.     IMM letter provided to patient.  Patient offered four hours to make informed decision regarding appeal process; patient agreeable to discharge.   
pneumonia of right upper lobe of lung [J18.9]    IF APPLICABLE: The Patient and/or patient representative Clary and her family were provided with a choice of provider and agrees with the discharge plan. Freedom of choice list with basic dialogue that supports the patient's individualized plan of care/goals and shares the quality data associated with the providers was provided to:     Patient Representative Name:       The Patient and/or Patient Representative Agree with the Discharge Plan?      Patrica Carlisle  Case Management Department

## 2025-01-05 NOTE — PLAN OF CARE
Problem: Chronic Conditions and Co-morbidities  Goal: Patient's chronic conditions and co-morbidity symptoms are monitored and maintained or improved  1/5/2025 0252 by Melly Mares RN  Outcome: Progressing  Flowsheets (Taken 1/4/2025 2000)  Care Plan - Patient's Chronic Conditions and Co-Morbidity Symptoms are Monitored and Maintained or Improved: Monitor and assess patient's chronic conditions and comorbid symptoms for stability, deterioration, or improvement  1/4/2025 1852 by Josie Thomas RN  Outcome: Progressing  Flowsheets (Taken 1/4/2025 0853)  Care Plan - Patient's Chronic Conditions and Co-Morbidity Symptoms are Monitored and Maintained or Improved:   Monitor and assess patient's chronic conditions and comorbid symptoms for stability, deterioration, or improvement   Collaborate with multidisciplinary team to address chronic and comorbid conditions and prevent exacerbation or deterioration   Update acute care plan with appropriate goals if chronic or comorbid symptoms are exacerbated and prevent overall improvement and discharge     Problem: Discharge Planning  Goal: Discharge to home or other facility with appropriate resources  1/5/2025 0252 by Melly Mares RN  Outcome: Progressing  Flowsheets (Taken 1/4/2025 2000)  Discharge to home or other facility with appropriate resources: Identify barriers to discharge with patient and caregiver  1/4/2025 1852 by Josie Thomas RN  Outcome: Progressing  Flowsheets (Taken 1/4/2025 0853)  Discharge to home or other facility with appropriate resources:   Identify barriers to discharge with patient and caregiver   Arrange for needed discharge resources and transportation as appropriate   Identify discharge learning needs (meds, wound care, etc)   Arrange for interpreters to assist at discharge as needed   Refer to discharge planning if patient needs post-hospital services based on physician order or complex needs related to functional status, cognitive 
  Problem: Chronic Conditions and Co-morbidities  Goal: Patient's chronic conditions and co-morbidity symptoms are monitored and maintained or improved  Outcome: Progressing  Flowsheets (Taken 1/3/2025 0041)  Care Plan - Patient's Chronic Conditions and Co-Morbidity Symptoms are Monitored and Maintained or Improved:   Monitor and assess patient's chronic conditions and comorbid symptoms for stability, deterioration, or improvement   Collaborate with multidisciplinary team to address chronic and comorbid conditions and prevent exacerbation or deterioration   Update acute care plan with appropriate goals if chronic or comorbid symptoms are exacerbated and prevent overall improvement and discharge     Problem: Discharge Planning  Goal: Discharge to home or other facility with appropriate resources  Outcome: Progressing  Flowsheets (Taken 1/3/2025 0041)  Discharge to home or other facility with appropriate resources:   Identify barriers to discharge with patient and caregiver   Refer to discharge planning if patient needs post-hospital services based on physician order or complex needs related to functional status, cognitive ability or social support system     Problem: ABCDS Injury Assessment  Goal: Absence of physical injury  Outcome: Progressing  Flowsheets (Taken 1/3/2025 0041)  Absence of Physical Injury: Implement safety measures based on patient assessment     Problem: Respiratory - Adult  Goal: Achieves optimal ventilation and oxygenation  Outcome: Progressing  Flowsheets (Taken 1/3/2025 0041)  Achieves optimal ventilation and oxygenation:   Assess for changes in respiratory status   Assess for changes in mentation and behavior   Position to facilitate oxygenation and minimize respiratory effort     
  Problem: Chronic Conditions and Co-morbidities  Goal: Patient's chronic conditions and co-morbidity symptoms are monitored and maintained or improved  Outcome: Progressing  Flowsheets (Taken 1/3/2025 2000)  Care Plan - Patient's Chronic Conditions and Co-Morbidity Symptoms are Monitored and Maintained or Improved: Monitor and assess patient's chronic conditions and comorbid symptoms for stability, deterioration, or improvement     Problem: Discharge Planning  Goal: Discharge to home or other facility with appropriate resources  Outcome: Progressing  Flowsheets (Taken 1/3/2025 2000)  Discharge to home or other facility with appropriate resources: Identify barriers to discharge with patient and caregiver     Problem: ABCDS Injury Assessment  Goal: Absence of physical injury  Outcome: Progressing     Problem: Respiratory - Adult  Goal: Achieves optimal ventilation and oxygenation  1/4/2025 0135 by Melly Mares RN  Outcome: Progressing  Flowsheets (Taken 1/3/2025 2000)  Achieves optimal ventilation and oxygenation: Assess for changes in respiratory status  1/3/2025 1948 by Art Ennis RCP  Outcome: Progressing  Flowsheets (Taken 1/3/2025 1948)  Achieves optimal ventilation and oxygenation:   Assess for changes in respiratory status   Respiratory therapy support as indicated   Oxygen supplementation based on oxygen saturation or arterial blood gases   Encourage broncho-pulmonary hygiene including cough, deep breathe, incentive spirometry   Assess and instruct to report shortness of breath or any respiratory difficulty     
  Problem: Respiratory - Adult  Goal: Achieves optimal ventilation and oxygenation  1/3/2025 1948 by Art Ennis, SHADI  Outcome: Progressing  Flowsheets (Taken 1/3/2025 1948)  Achieves optimal ventilation and oxygenation:   Assess for changes in respiratory status   Respiratory therapy support as indicated   Oxygen supplementation based on oxygen saturation or arterial blood gases   Encourage broncho-pulmonary hygiene including cough, deep breathe, incentive spirometry   Assess and instruct to report shortness of breath or any respiratory difficulty     
  Problem: Respiratory - Adult  Goal: Achieves optimal ventilation and oxygenation  1/5/2025 0849 by Mayra Goldsmith RCP  Outcome: Progressing  1/5/2025 0252 by Melly Mares RN  Outcome: Progressing  Flowsheets (Taken 1/4/2025 2000)  Achieves optimal ventilation and oxygenation: Assess for changes in respiratory status  1/4/2025 1852 by Josie Thomas RN  Outcome: Progressing  Flowsheets (Taken 1/4/2025 0853)  Achieves optimal ventilation and oxygenation:   Assess for changes in respiratory status   Assess for changes in mentation and behavior   Position to facilitate oxygenation and minimize respiratory effort   Oxygen supplementation based on oxygen saturation or arterial blood gases   Initiate smoking cessation protocol as indicated   Encourage broncho-pulmonary hygiene including cough, deep breathe, incentive spirometry   Assess the need for suctioning and aspirate as needed   Assess and instruct to report shortness of breath or any respiratory difficulty   Respiratory therapy support as indicated     Problem: Respiratory - Adult  Goal: Achieves optimal ventilation and oxygenation  1/5/2025 0849 by Mayra Goldsmith RCP  Outcome: Progressing  1/5/2025 0252 by Melly Mares RN  Outcome: Progressing  Flowsheets (Taken 1/4/2025 2000)  Achieves optimal ventilation and oxygenation: Assess for changes in respiratory status  1/4/2025 1852 by Josie Thomas RN  Outcome: Progressing  Flowsheets (Taken 1/4/2025 0853)  Achieves optimal ventilation and oxygenation:   Assess for changes in respiratory status   Assess for changes in mentation and behavior   Position to facilitate oxygenation and minimize respiratory effort   Oxygen supplementation based on oxygen saturation or arterial blood gases   Initiate smoking cessation protocol as indicated   Encourage broncho-pulmonary hygiene including cough, deep breathe, incentive spirometry   Assess the need for suctioning and aspirate as 
Aerosol treatment ordered by dr metzger. Few crackles and diminished bases instructed pt on duoneb aerosol  
Detail Level: Detailed
broncho-pulmonary hygiene including cough, deep breathe, incentive spirometry   Assess the need for suctioning and aspirate as needed   Assess and instruct to report shortness of breath or any respiratory difficulty   Respiratory therapy support as indicated     
Add 01408 Cpt? (Important Note: In 2017 The Use Of 10338 Is Being Tracked By Cms To Determine Future Global Period Reimbursement For Global Periods): yes

## 2025-01-05 NOTE — PROGRESS NOTES
01/03/25 1236   Encounter Summary   Service Provided For Patient   Referral/Consult From Rounding   Last Encounter  01/03/25   Spiritual/Emotional needs   Type Spiritual Support  (Volunteer)   Assessment/Intervention/Outcome   Intervention Prayer (assurance of)/[place]       
  [facility] Family Medicine  IN-PATIENT SERVICE   [facility] - Location: [place]    Progress Note    1/4/2025    12:13 PM    Name:   Clary Harris  MRN:     [id number]     Acct:      [id number]   Room:   [facility]-01   Day:  2  Admit Date:  1/2/2025  4:11 PM    PCP:   Kandi Thompson DO  Code Status:  Full Code    Subjective:     Patient had uneventful night.  Vital signs are stable.  She is satting in the mid 90s on 2 L of O2.  No dyspnea.  Patient is alert appetite is good up ambulating in the room.  Respiratory panel positive for strep pneumonia  Medications:     Allergies:    Allergies   Allergen Reactions    Hydrochlorothiazide     Sulfa Antibiotics Hives, Other (See Comments) and Itching       Current Meds:   Scheduled Meds:    methylPREDNISolone  40 mg IntraVENous 3 times per day    ipratropium 0.5 mg-albuterol 2.5 mg  1 Dose Inhalation 4x Daily RT    insulin lispro  0-4 Units SubCUTAneous 4x Daily AC & HS    sodium chloride  80 mL IntraVENous Once    sodium chloride flush  5-40 mL IntraVENous 2 times per day    enoxaparin  40 mg SubCUTAneous Daily    amLODIPine  5 mg Oral Daily    valsartan  320 mg Oral Daily    carvedilol  6.25 mg Oral BID WC    citalopram  20 mg Oral Daily    coenzyme Q10  200 mg Oral Daily    furosemide  20 mg Oral Daily    levothyroxine  100 mcg Oral Daily    meloxicam  15 mg Oral Daily    pantoprazole  40 mg Oral QAM AC    rosuvastatin  20 mg Oral Daily    traZODone  100 mg Oral Nightly    cefTRIAXone (ROCEPHIN) IV  1,000 mg IntraVENous Q24H    azithromycin  500 mg IntraVENous Q24H     Continuous Infusions:    dextrose      sodium chloride       PRN Meds: benzonatate, glucose, dextrose bolus **OR** dextrose bolus, glucagon (rDNA), dextrose, sodium chloride flush, sodium chloride flush, sodium chloride, potassium chloride **OR** potassium alternative oral replacement **OR** potassium chloride, magnesium sulfate, ondansetron **OR** ondansetron, polyethylene glycol, 
Pulmonary Progress Note  Pulmonary and Critical Care Specialists      Patient - Clary Harris,  Age - 79 y.o.    - 1945      Room Number - 327/327-01   N -  [id number]   [facility] # - [id number]  Date of Admission -  2025  4:11 PM        Consulting Service/Physician   Consulting - Xiomara Palm MD  Primary Care Physician - Kandi Thompson DO     SUBJECTIVE   Patient is an good spirits.  She reportedly feels better than yesterday per her report    OBJECTIVE   VITALS    height is 1.651 m (5' 5\") and weight is 85.9 kg (189 lb 6 oz). Her oral temperature is 97.8 °F (36.6 °C). Her blood pressure is 110/75 and her pulse is 80. Her respiration is 20 and oxygen saturation is 92%.     Body mass index is 31.51 kg/m².  Temperature Range: Temp: 97.8 °F (36.6 °C) Temp  Av.9 °F (36.6 °C)  Min: 97.4 °F (36.3 °C)  Max: 98.4 °F (36.9 °C)  BP Range:  Systolic (24hrs), Av , Min:107 , Max:126     Diastolic (24hrs), Av, Min:66, Max:84    Pulse Range: Pulse  Av.1  Min: 67  Max: 85  Respiration Range: Resp  Av.7  Min: 16  Max: 20  Current Pulse Ox::  SpO2: 92 %  24HR Pulse Ox Range:  SpO2  Av.2 %  Min: 91 %  Max: 96 %  Oxygen Amount and Delivery: O2 Flow Rate (L/min): 0 L/min    Wt Readings from Last 3 Encounters:   25 85.9 kg (189 lb 6 oz)   24 84.6 kg (186 lb 6.4 oz)   24 83.9 kg (185 lb)       I/O (24 Hours)    Intake/Output Summary (Last 24 hours) at 2025 4577  Last data filed at 2025 0908  Gross per 24 hour   Intake 10 ml   Output --   Net 10 ml       EXAM     General Appearance  Awake, alert, oriented, in no acute distress  HEENT - normocephalic, atraumatic.  Neck - Supple,  trachea midline   Lungs -coarse breath sounds I do not hear any crackles rales or wheezes   Heart Exam:PMI normal. No lifts, heaves, or thrills. RRR. No murmurs, clicks, gallops, or rubs  Abdomen Exam: Abdomen soft, non-tender. BS normal.   Extremity Exam: No signs of cyanosis    MEDS 
\"POCPO2\", \"PO2ART\", \"PO2\", \"POCHCO3\", \"GVX8AWM\", \"HCO3\", \"NBEA\", \"PBEA\", \"BEART\", \"BE\", \"THGBART\", \"THB\", \"OAG7YLU\", \"KPAG0WQO\", \"T5MCWWJT\", \"O2SAT\", \"FIO2\"  Lab Results   Component Value Date/Time    SPECIAL RIGHT HAND 10CC 01/02/2025 09:15 PM     Lab Results   Component Value Date/Time    CULTURE NO GROWTH 2 DAYS 01/02/2025 09:15 PM       Radiology:  CT CHEST PULMONARY EMBOLISM W CONTRAST    Result Date: 1/2/2025  1. No evidence of pulmonary embolic disease. 2. Patchy ground-glass opacification in the left upper lobe and superior segment of the left lower lobe as well as focal consolidation in the right upper lobe posteriorly, possibly multifocal infiltrate. 3. Small right pleural effusion with dependent changes in the right middle and lower lobe, overall improved. 4. Mild cardiomegaly with coronary atherosclerosis.       Physical Examination:     General appearance:  alert, cooperative and no distress  Neck: No masses nor rigidity  Lungs: Few faint rales were heard at both bases no rhonchi no wheezes good airflow was noted  Heart: Normal sinus rhythm no extrasystoles  Abdomen: Flat nondistended  Extremities: No edema  Skin:  no gross lesions, rashes, induration    Assessment:     Hospital Problems             Last Modified POA    * (Principal) Community acquired pneumonia of right upper lobe of lung 1/2/2025 Yes   History of COPD  Degenerative joint disease  Hypertension  Hyperlipidemia  Compensated hypothyroidism  History of mild valvular heart disease  Osteoarthritis  Type 2 diabetes mellitus without complications  History of sleep apnea    Plan:       Medical Decision Making: Medium  Discussed with pulmonary plan on discharging patient this afternoon on tapering dose of steroids and Ceftin 500 mg twice daily.  Discussed with discharge planner about home O2    John Mock MD  1/5/2025  11:02 AM     Portion of this note were dictated using Dragon speech recognition software. It has been reviewed for accuracy,

## 2025-01-05 NOTE — DISCHARGE INSTR - COC
Rehab): Good    Recommended Labs or Other Treatments After Discharge: Nocturnal O2 evaluation    Physician Certification: I certify the above information and transfer of Clary Harris  is necessary for the continuing treatment of the diagnosis listed and that she requires Home Care for less 30 days.     Update Admission H&P: No change in H&P    PHYSICIAN SIGNATURE:  Electronically signed by John Mock MD on 1/5/25 at 11:15 AM EST

## 2025-01-05 NOTE — DISCHARGE INSTR - DIET

## 2025-01-05 NOTE — FLOWSHEET NOTE
Home Oxygen Evaluation    Home Oxygen Evaluation completed.    Patient is on 2 liters per minute via nasal cannula.  Resting SpO2 = 97%  Resting SpO2 on room air = 92%    SpO2 on room air with exercise = 85%  SpO2 on oxygen as above with exercise = 92%    Nocturnal Oximetry with patient on room air is recommended is SpO2 is between 89% and 95% (requires additional order).    Mayra Goldsmith RCP  8:44 AM

## 2025-01-06 ENCOUNTER — TELEPHONE (OUTPATIENT)
Age: 80
End: 2025-01-06

## 2025-01-06 LAB — M PNEUMO IGM SER QL IA: 0.35

## 2025-01-06 NOTE — TELEPHONE ENCOUNTER
Care Transitions Initial Follow Up Call    Outreach made within 2 business days of discharge: Yes    Patient: Clary Harris Patient : 1945   MRN: [id number]  Reason for Admission: Pneumonia   Discharge Date: 25       Spoke with: Patient    Discharge department/facility: [facility] Interactive Patient Contact:  Was patient able to fill all prescriptions: Yes  Was patient instructed to bring all medications to the follow-up visit: Yes  Is patient taking all medications as directed in the discharge summary? Yes  Does patient understand their discharge instructions: Yes  Does patient have questions or concerns that need addressed prior to 7-14 day follow up office visit: no    Additional needs identified to be addressed with provider  No needs identified             Scheduled appointment with PCP within 7-14 days    Follow Up  Future Appointments   Date Time Provider Department Center   2025  4:20 PM Kandi Thompson DO [place] [facility]   2025 10:20 AM Kandi Thompson DO [place] [facility] DEP       DAYRON PERES MA

## 2025-01-06 NOTE — TELEPHONE ENCOUNTER
It looks like  sent to [facility] yesterday  Call [facility] and make sure they have it- ok for MA to call in and read order from

## 2025-01-06 NOTE — TELEPHONE ENCOUNTER
Patient was d/c from hospital yesterday    She got all her meds called in, but they missed one    Needs:  Prednisone 10mg    [facility] Null

## 2025-01-06 NOTE — TELEPHONE ENCOUNTER
Called and gave verbal order to [facility] for prednisone.  Patient notified that [facility] had new script and was processing.

## 2025-01-08 LAB
MICROORGANISM SPEC CULT: NORMAL
MICROORGANISM SPEC CULT: NORMAL
SERVICE CMNT-IMP: NORMAL
SERVICE CMNT-IMP: NORMAL
SPECIMEN DESCRIPTION: NORMAL
SPECIMEN DESCRIPTION: NORMAL

## 2025-01-13 ENCOUNTER — OFFICE VISIT (OUTPATIENT)
Age: 80
End: 2025-01-13
Payer: MEDICARE

## 2025-01-13 VITALS
BODY MASS INDEX: 30.29 KG/M2 | OXYGEN SATURATION: 95 % | HEART RATE: 88 BPM | DIASTOLIC BLOOD PRESSURE: 70 MMHG | WEIGHT: 182 LBS | SYSTOLIC BLOOD PRESSURE: 114 MMHG | TEMPERATURE: 98.8 F

## 2025-01-13 DIAGNOSIS — M15.0 PRIMARY OSTEOARTHRITIS INVOLVING MULTIPLE JOINTS: ICD-10-CM

## 2025-01-13 DIAGNOSIS — F51.01 PRIMARY INSOMNIA: ICD-10-CM

## 2025-01-13 DIAGNOSIS — K21.9 GASTROESOPHAGEAL REFLUX DISEASE WITHOUT ESOPHAGITIS: ICD-10-CM

## 2025-01-13 DIAGNOSIS — D64.9 ANEMIA, UNSPECIFIED TYPE: ICD-10-CM

## 2025-01-13 DIAGNOSIS — E78.2 MIXED HYPERLIPIDEMIA: ICD-10-CM

## 2025-01-13 DIAGNOSIS — J18.9 PNEUMONIA OF BOTH LUNGS DUE TO INFECTIOUS ORGANISM, UNSPECIFIED PART OF LUNG: Primary | ICD-10-CM

## 2025-01-13 DIAGNOSIS — E11.69 TYPE 2 DIABETES MELLITUS WITH OTHER SPECIFIED COMPLICATION, WITHOUT LONG-TERM CURRENT USE OF INSULIN (HCC): ICD-10-CM

## 2025-01-13 DIAGNOSIS — I10 ESSENTIAL HYPERTENSION: ICD-10-CM

## 2025-01-13 DIAGNOSIS — F33.0 MAJOR DEPRESSIVE DISORDER, RECURRENT, MILD (HCC): ICD-10-CM

## 2025-01-13 PROCEDURE — G8427 DOCREV CUR MEDS BY ELIG CLIN: HCPCS | Performed by: FAMILY MEDICINE

## 2025-01-13 PROCEDURE — G8417 CALC BMI ABV UP PARAM F/U: HCPCS | Performed by: FAMILY MEDICINE

## 2025-01-13 PROCEDURE — 1036F TOBACCO NON-USER: CPT | Performed by: FAMILY MEDICINE

## 2025-01-13 PROCEDURE — 1160F RVW MEDS BY RX/DR IN RCRD: CPT | Performed by: FAMILY MEDICINE

## 2025-01-13 PROCEDURE — 3078F DIAST BP <80 MM HG: CPT | Performed by: FAMILY MEDICINE

## 2025-01-13 PROCEDURE — 1159F MED LIST DOCD IN RCRD: CPT | Performed by: FAMILY MEDICINE

## 2025-01-13 PROCEDURE — 1111F DSCHRG MED/CURRENT MED MERGE: CPT | Performed by: FAMILY MEDICINE

## 2025-01-13 PROCEDURE — 1090F PRES/ABSN URINE INCON ASSESS: CPT | Performed by: FAMILY MEDICINE

## 2025-01-13 PROCEDURE — 99214 OFFICE O/P EST MOD 30 MIN: CPT | Performed by: FAMILY MEDICINE

## 2025-01-13 PROCEDURE — G8400 PT W/DXA NO RESULTS DOC: HCPCS | Performed by: FAMILY MEDICINE

## 2025-01-13 PROCEDURE — 1123F ACP DISCUSS/DSCN MKR DOCD: CPT | Performed by: FAMILY MEDICINE

## 2025-01-13 PROCEDURE — 3074F SYST BP LT 130 MM HG: CPT | Performed by: FAMILY MEDICINE

## 2025-01-13 RX ORDER — LEVOTHYROXINE SODIUM 100 UG/1
100 TABLET ORAL DAILY
Qty: 90 TABLET | Refills: 3 | Status: SHIPPED | OUTPATIENT
Start: 2025-01-13

## 2025-01-13 RX ORDER — AMLODIPINE BESYLATE 5 MG/1
5 TABLET ORAL DAILY
Qty: 90 TABLET | Refills: 3 | Status: SHIPPED | OUTPATIENT
Start: 2025-01-13

## 2025-01-13 RX ORDER — MELOXICAM 15 MG/1
15 TABLET ORAL DAILY
Qty: 90 TABLET | Refills: 1 | Status: SHIPPED | OUTPATIENT
Start: 2025-01-13

## 2025-01-13 RX ORDER — PREDNISONE 10 MG/1
TABLET ORAL
Qty: 16 TABLET | Refills: 0 | Status: CANCELLED | OUTPATIENT
Start: 2025-01-13

## 2025-01-13 RX ORDER — CITALOPRAM HYDROBROMIDE 20 MG/1
20 TABLET ORAL DAILY
Qty: 90 TABLET | Refills: 3 | Status: SHIPPED | OUTPATIENT
Start: 2025-01-13

## 2025-01-13 RX ORDER — CANDESARTAN 32 MG/1
32 TABLET ORAL DAILY
Qty: 90 TABLET | Refills: 3 | Status: SHIPPED | OUTPATIENT
Start: 2025-01-13

## 2025-01-13 RX ORDER — SEMAGLUTIDE 1.34 MG/ML
0.5 INJECTION, SOLUTION SUBCUTANEOUS WEEKLY
Qty: 2 ML | Refills: 0 | Status: CANCELLED | OUTPATIENT
Start: 2025-01-13

## 2025-01-13 RX ORDER — FUROSEMIDE 20 MG/1
20 TABLET ORAL DAILY
Qty: 30 TABLET | Refills: 2 | Status: CANCELLED | OUTPATIENT
Start: 2025-01-13

## 2025-01-13 RX ORDER — ROSUVASTATIN CALCIUM 20 MG/1
20 TABLET, COATED ORAL NIGHTLY
Qty: 90 TABLET | Refills: 3 | Status: SHIPPED | OUTPATIENT
Start: 2025-01-13

## 2025-01-13 RX ORDER — TRAZODONE HYDROCHLORIDE 100 MG/1
100 TABLET ORAL NIGHTLY
Qty: 90 TABLET | Refills: 3 | Status: SHIPPED | OUTPATIENT
Start: 2025-01-13 | End: 2026-01-08

## 2025-01-13 RX ORDER — CARVEDILOL 6.25 MG/1
6.25 TABLET ORAL 2 TIMES DAILY WITH MEALS
Qty: 180 TABLET | Refills: 3 | Status: SHIPPED | OUTPATIENT
Start: 2025-01-13

## 2025-01-13 ASSESSMENT — PATIENT HEALTH QUESTIONNAIRE - PHQ9
SUM OF ALL RESPONSES TO PHQ QUESTIONS 1-9: 0
SUM OF ALL RESPONSES TO PHQ9 QUESTIONS 1 & 2: 0
SUM OF ALL RESPONSES TO PHQ QUESTIONS 1-9: 0
6. FEELING BAD ABOUT YOURSELF - OR THAT YOU ARE A FAILURE OR HAVE LET YOURSELF OR YOUR FAMILY DOWN: NOT AT ALL
2. FEELING DOWN, DEPRESSED OR HOPELESS: NOT AT ALL
8. MOVING OR SPEAKING SO SLOWLY THAT OTHER PEOPLE COULD HAVE NOTICED. OR THE OPPOSITE, BEING SO FIGETY OR RESTLESS THAT YOU HAVE BEEN MOVING AROUND A LOT MORE THAN USUAL: NOT AT ALL
9. THOUGHTS THAT YOU WOULD BE BETTER OFF DEAD, OR OF HURTING YOURSELF: NOT AT ALL
3. TROUBLE FALLING OR STAYING ASLEEP: NOT AT ALL
10. IF YOU CHECKED OFF ANY PROBLEMS, HOW DIFFICULT HAVE THESE PROBLEMS MADE IT FOR YOU TO DO YOUR WORK, TAKE CARE OF THINGS AT HOME, OR GET ALONG WITH OTHER PEOPLE: NOT DIFFICULT AT ALL
SUM OF ALL RESPONSES TO PHQ QUESTIONS 1-9: 0
SUM OF ALL RESPONSES TO PHQ QUESTIONS 1-9: 0
4. FEELING TIRED OR HAVING LITTLE ENERGY: NOT AT ALL
5. POOR APPETITE OR OVEREATING: NOT AT ALL
1. LITTLE INTEREST OR PLEASURE IN DOING THINGS: NOT AT ALL
7. TROUBLE CONCENTRATING ON THINGS, SUCH AS READING THE NEWSPAPER OR WATCHING TELEVISION: NOT AT ALL

## 2025-01-13 NOTE — PROGRESS NOTES
hypertension  Comments:  continue amlodipine, candesartan, and carvedilol  Orders:  -     amLODIPine (NORVASC) 5 MG tablet; Take 1 tablet by mouth daily, Disp-90 tablet, R-3Normal  -     candesartan (ATACAND) 32 MG tablet; Take 1 tablet by mouth daily, Disp-90 tablet, R-3Normal  -     carvedilol (COREG) 6.25 MG tablet; Take 1 tablet by mouth 2 times daily (with meals), Disp-180 tablet, R-3Normal  3. Major depressive disorder, recurrent, mild (HCC)  -     citalopram (CELEXA) 20 MG tablet; Take 1 tablet by mouth daily, Disp-90 tablet, R-3Normal  4. Primary osteoarthritis involving multiple joints  Comments:  wean mobic to every other day  Orders:  -     meloxicam (MOBIC) 15 MG tablet; Take 1 tablet by mouth daily, Disp-90 tablet, R-1Normal  5. Gastroesophageal reflux disease without esophagitis  -     omeprazole (PRILOSEC) 20 MG delayed release capsule; Take 1 capsule by mouth daily, Disp-90 capsule, R-3Normal  6. Mixed hyperlipidemia  Comments:  continue rosuvastatin  Orders:  -     rosuvastatin (CRESTOR) 20 MG tablet; Take 1 tablet by mouth at bedtime, Disp-90 tablet, R-3Normal  7. Type 2 diabetes mellitus with other specified complication, without long-term current use of insulin ([facility])  -     Semaglutide, 1 MG/DOSE, (OZEMPIC) 4 MG/3ML SOPN sc injection; Inject 1 mg into the skin every 7 days, Disp-3 Adjustable Dose Pre-filled Pen Syringe, R-1Normal  8. Anemia, unspecified type  -     CBC with Auto Differential; Future  9. Primary insomnia  -     traZODone (DESYREL) 100 MG tablet; Take 1 tablet by mouth nightly, Disp-90 tablet, R-3Normal         CBC and thyroid studies in FEB  Fu pulmonary  O2 prn  Refills sent   Mammogram scheduled  Update DEXA in future    Return in about 3 months (around 4/13/2025).

## 2025-02-05 DIAGNOSIS — E11.69 TYPE 2 DIABETES MELLITUS WITH OTHER SPECIFIED COMPLICATION, WITHOUT LONG-TERM CURRENT USE OF INSULIN (HCC): ICD-10-CM

## 2025-02-05 RX ORDER — SEMAGLUTIDE 1.34 MG/ML
INJECTION, SOLUTION SUBCUTANEOUS
Qty: 9 ML | Refills: 3 | Status: SHIPPED | OUTPATIENT
Start: 2025-02-05

## 2025-02-05 NOTE — TELEPHONE ENCOUNTER
Clary Harris is calling to request a refill on the following medication(s):    Medication Request:  Requested Prescriptions     Pending Prescriptions Disp Refills    OZEMPIC, 1 MG/DOSE, 4 MG/3ML SOPN sc injection [Pharmacy Med Name: OZEMPIC PEN (1MG/DOSE) 3ML 4MG/3ML] 9 mL 3     Sig: INJECT 1 MG UNDER THE SKIN EVERY 7 DAYS       Last Visit Date (If Applicable):  1/13/2025    Next Visit Date:    5/12/2025

## 2025-02-11 ENCOUNTER — HOSPITAL ENCOUNTER (OUTPATIENT)
Age: 80
Setting detail: SPECIMEN
Discharge: HOME | End: 2025-02-11

## 2025-02-11 DIAGNOSIS — E03.9 HYPOTHYROIDISM, UNSPECIFIED TYPE: ICD-10-CM

## 2025-02-11 DIAGNOSIS — D64.9 ANEMIA, UNSPECIFIED TYPE: ICD-10-CM

## 2025-02-11 LAB
BASOPHILS # BLD: 0.06 K/UL (ref 0–0.2)
BASOPHILS NFR BLD: 1 % (ref 0–2)
EOSINOPHIL # BLD: 0.05 K/UL (ref 0–0.44)
EOSINOPHIL # BLD: 49 /UL (ref 200–400)
EOSINOPHILS RELATIVE PERCENT: 1 % (ref 1–4)
ERYTHROCYTE [DISTWIDTH] IN BLOOD BY AUTOMATED COUNT: 15.7 % (ref 11.8–14.4)
HCT VFR BLD AUTO: 43.2 % (ref 36.3–47.1)
HGB BLD-MCNC: 12.9 G/DL (ref 11.9–15.1)
IMM GRANULOCYTES # BLD AUTO: <0.03 K/UL (ref 0–0.3)
IMM GRANULOCYTES NFR BLD: 0 %
LYMPHOCYTES NFR BLD: 1.99 K/UL (ref 1.1–3.7)
LYMPHOCYTES RELATIVE PERCENT: 24 % (ref 24–43)
MCH RBC QN AUTO: 26.5 PG (ref 25.2–33.5)
MCHC RBC AUTO-ENTMCNC: 29.9 G/DL (ref 28.4–34.8)
MCV RBC AUTO: 88.9 FL (ref 82.6–102.9)
MONOCYTES NFR BLD: 0.45 K/UL (ref 0.1–1.2)
MONOCYTES NFR BLD: 6 % (ref 3–12)
NEUTROPHILS NFR BLD: 68 % (ref 36–65)
NEUTS SEG NFR BLD: 5.58 K/UL (ref 1.5–8.1)
NRBC BLD-RTO: 0 PER 100 WBC
PLATELET # BLD AUTO: 263 K/UL (ref 138–453)
PMV BLD AUTO: 10 FL (ref 8.1–13.5)
RBC # BLD AUTO: 4.86 M/UL (ref 3.95–5.11)
RBC # BLD: ABNORMAL 10*6/UL
T4 FREE SERPL-MCNC: 1.1 NG/DL (ref 0.92–1.68)
TSH SERPL DL<=0.05 MIU/L-ACNC: 2.04 UIU/ML (ref 0.27–4.2)
WBC OTHER # BLD: 8.1 K/UL (ref 3.5–11.3)

## 2025-02-12 LAB
A ALTERNATA IGE QN: <0.1 KU/L (ref 0–0.34)
A FUMIGATUS IGE QN: <0.1 KU/L (ref 0–0.34)
ALLERGEN BIRCH IGE: <0.1 KU/L (ref 0–0.34)
BERMUDA GRASS IGE QN: <0.1 KU/L (ref 0–0.34)
BOXELDER IGE QN: <0.1 KU/L (ref 0–0.34)
C HERBARUM IGE QN: <0.1 KUL/L (ref 0–0.34)
CALIF WALNUT POLN IGE QN: <0.1 KU/L (ref 0–0.34)
CAT DANDER IGE QN: <0.1 KU/L (ref 0–0.34)
CMN PIGWEED IGE QN: <0.1 KU/L (ref 0–0.34)
COMMON RAGWEED IGE QN: <0.1 KU/L (ref 0–0.34)
COTTONWOOD IGE QN: <0.1 KU/L (ref 0–0.34)
D FARINAE IGE QN: <0.1 KU/L (ref 0–0.34)
D PTERONYSS IGE QN: <0.1 KU/L (ref 0–0.34)
DOG DANDER IGE QN: <0.1 KU/L (ref 0–0.34)
IGE SERPL-ACNC: 6 IU/ML (ref 0–100)
LONDON PLANE IGE QN: <0.1 KU/L (ref 0–0.34)
M RACEMOSUS IGE QN: <0.1 KU/L (ref 0–0.34)
MOUSE EPITH IGE QN: <0.1 KU/L (ref 0–0.34)
MT JUNIPER IGE QN: <0.1 KU/L (ref 0–0.34)
P NOTATUM IGE QN: <0.1 KU/L (ref 0–0.34)
PECAN/HICK TREE IGE QN: <0.1 KU/L (ref 0–0.34)
ROACH IGE QN: <0.1 KU/L (ref 0–0.34)
SALTWORT IGE QN: <0.1 KU/L (ref 0–0.34)
SHEEP SORREL IGE QN: <0.1 KU/L (ref 0–0.34)
TIMOTHY IGE QN: <0.1 KU/L (ref 0–0.34)
WHITE ASH IGE QN: <0.1 KU/L (ref 0–0.34)
WHITE ELM IGE QN: <0.1 KU/L (ref 0–0.34)
WHITE MULBERRY IGE QN: <0.1 KU/L (ref 0–0.34)
WHITE OAK IGE QN: <0.1 KU/L (ref 0–0.34)

## 2025-05-06 SDOH — HEALTH STABILITY: PHYSICAL HEALTH: ON AVERAGE, HOW MANY MINUTES DO YOU ENGAGE IN EXERCISE AT THIS LEVEL?: 30 MIN

## 2025-05-06 SDOH — HEALTH STABILITY: PHYSICAL HEALTH: ON AVERAGE, HOW MANY DAYS PER WEEK DO YOU ENGAGE IN MODERATE TO STRENUOUS EXERCISE (LIKE A BRISK WALK)?: 1 DAY

## 2025-05-06 ASSESSMENT — PATIENT HEALTH QUESTIONNAIRE - PHQ9
SUM OF ALL RESPONSES TO PHQ QUESTIONS 1-9: 0
3. TROUBLE FALLING OR STAYING ASLEEP: NOT AT ALL
7. TROUBLE CONCENTRATING ON THINGS, SUCH AS READING THE NEWSPAPER OR WATCHING TELEVISION: NOT AT ALL
9. THOUGHTS THAT YOU WOULD BE BETTER OFF DEAD, OR OF HURTING YOURSELF: NOT AT ALL
2. FEELING DOWN, DEPRESSED OR HOPELESS: NOT AT ALL
6. FEELING BAD ABOUT YOURSELF - OR THAT YOU ARE A FAILURE OR HAVE LET YOURSELF OR YOUR FAMILY DOWN: NOT AT ALL
1. LITTLE INTEREST OR PLEASURE IN DOING THINGS: NOT AT ALL
SUM OF ALL RESPONSES TO PHQ QUESTIONS 1-9: 0
4. FEELING TIRED OR HAVING LITTLE ENERGY: NOT AT ALL
8. MOVING OR SPEAKING SO SLOWLY THAT OTHER PEOPLE COULD HAVE NOTICED. OR THE OPPOSITE, BEING SO FIGETY OR RESTLESS THAT YOU HAVE BEEN MOVING AROUND A LOT MORE THAN USUAL: NOT AT ALL
5. POOR APPETITE OR OVEREATING: NOT AT ALL
10. IF YOU CHECKED OFF ANY PROBLEMS, HOW DIFFICULT HAVE THESE PROBLEMS MADE IT FOR YOU TO DO YOUR WORK, TAKE CARE OF THINGS AT HOME, OR GET ALONG WITH OTHER PEOPLE: NOT DIFFICULT AT ALL
SUM OF ALL RESPONSES TO PHQ QUESTIONS 1-9: 0
SUM OF ALL RESPONSES TO PHQ QUESTIONS 1-9: 0

## 2025-05-06 ASSESSMENT — LIFESTYLE VARIABLES
HOW OFTEN DO YOU HAVE A DRINK CONTAINING ALCOHOL: 2
HOW OFTEN DO YOU HAVE SIX OR MORE DRINKS ON ONE OCCASION: 1
HOW MANY STANDARD DRINKS CONTAINING ALCOHOL DO YOU HAVE ON A TYPICAL DAY: 1
HOW MANY STANDARD DRINKS CONTAINING ALCOHOL DO YOU HAVE ON A TYPICAL DAY: 1 OR 2
HOW OFTEN DO YOU HAVE A DRINK CONTAINING ALCOHOL: MONTHLY OR LESS

## 2025-05-12 ENCOUNTER — OFFICE VISIT (OUTPATIENT)
Age: 80
End: 2025-05-12
Payer: MEDICARE

## 2025-05-12 VITALS
WEIGHT: 188 LBS | HEIGHT: 65 IN | RESPIRATION RATE: 14 BRPM | HEART RATE: 69 BPM | DIASTOLIC BLOOD PRESSURE: 76 MMHG | OXYGEN SATURATION: 96 % | BODY MASS INDEX: 31.32 KG/M2 | SYSTOLIC BLOOD PRESSURE: 120 MMHG

## 2025-05-12 DIAGNOSIS — Z12.31 ENCOUNTER FOR SCREENING MAMMOGRAM FOR MALIGNANT NEOPLASM OF BREAST: ICD-10-CM

## 2025-05-12 DIAGNOSIS — E78.2 MIXED HYPERLIPIDEMIA: ICD-10-CM

## 2025-05-12 DIAGNOSIS — I10 ESSENTIAL HYPERTENSION: ICD-10-CM

## 2025-05-12 DIAGNOSIS — E03.9 HYPOTHYROIDISM, UNSPECIFIED TYPE: ICD-10-CM

## 2025-05-12 DIAGNOSIS — Z00.00 MEDICARE ANNUAL WELLNESS VISIT, SUBSEQUENT: Primary | ICD-10-CM

## 2025-05-12 DIAGNOSIS — Z78.0 POSTMENOPAUSAL: ICD-10-CM

## 2025-05-12 DIAGNOSIS — M15.0 PRIMARY OSTEOARTHRITIS INVOLVING MULTIPLE JOINTS: ICD-10-CM

## 2025-05-12 DIAGNOSIS — E11.69 TYPE 2 DIABETES MELLITUS WITH OTHER SPECIFIED COMPLICATION, WITHOUT LONG-TERM CURRENT USE OF INSULIN (HCC): ICD-10-CM

## 2025-05-12 DIAGNOSIS — F33.1 MAJOR DEPRESSIVE DISORDER, RECURRENT, MODERATE (HCC): ICD-10-CM

## 2025-05-12 DIAGNOSIS — J44.9 CHRONIC OBSTRUCTIVE PULMONARY DISEASE, UNSPECIFIED COPD TYPE (HCC): ICD-10-CM

## 2025-05-12 PROCEDURE — 3023F SPIROM DOC REV: CPT | Performed by: FAMILY MEDICINE

## 2025-05-12 PROCEDURE — 3078F DIAST BP <80 MM HG: CPT | Performed by: FAMILY MEDICINE

## 2025-05-12 PROCEDURE — 1160F RVW MEDS BY RX/DR IN RCRD: CPT | Performed by: FAMILY MEDICINE

## 2025-05-12 PROCEDURE — G8400 PT W/DXA NO RESULTS DOC: HCPCS | Performed by: FAMILY MEDICINE

## 2025-05-12 PROCEDURE — 3074F SYST BP LT 130 MM HG: CPT | Performed by: FAMILY MEDICINE

## 2025-05-12 PROCEDURE — 99214 OFFICE O/P EST MOD 30 MIN: CPT | Performed by: FAMILY MEDICINE

## 2025-05-12 PROCEDURE — 1036F TOBACCO NON-USER: CPT | Performed by: FAMILY MEDICINE

## 2025-05-12 PROCEDURE — G8427 DOCREV CUR MEDS BY ELIG CLIN: HCPCS | Performed by: FAMILY MEDICINE

## 2025-05-12 PROCEDURE — 1159F MED LIST DOCD IN RCRD: CPT | Performed by: FAMILY MEDICINE

## 2025-05-12 PROCEDURE — G8417 CALC BMI ABV UP PARAM F/U: HCPCS | Performed by: FAMILY MEDICINE

## 2025-05-12 PROCEDURE — 1090F PRES/ABSN URINE INCON ASSESS: CPT | Performed by: FAMILY MEDICINE

## 2025-05-12 PROCEDURE — 1123F ACP DISCUSS/DSCN MKR DOCD: CPT | Performed by: FAMILY MEDICINE

## 2025-05-12 PROCEDURE — G0439 PPPS, SUBSEQ VISIT: HCPCS | Performed by: FAMILY MEDICINE

## 2025-05-12 RX ORDER — MELOXICAM 15 MG/1
15 TABLET ORAL DAILY
Qty: 90 TABLET | Refills: 1 | Status: SHIPPED | OUTPATIENT
Start: 2025-05-12

## 2025-05-12 RX ORDER — CEPHALEXIN 500 MG/1
CAPSULE ORAL
COMMUNITY
Start: 2025-02-27 | End: 2025-05-12

## 2025-05-12 NOTE — PATIENT INSTRUCTIONS
Clary    Thank you for choosing [facility].  We know you have options when it comes to your healthcare; we appreciate that you chose us. Our goal is to provide exceptional  service and world class care to every patient.  You will be receiving a survey via email or text message asking for your feedback.  Please take a few minutes to share your thoughts about your recent visit. Your comments help us understand what we do well and ways we can improve.  Thank you in advance for your valuable feedback.      Dr. Donna Thompson, DO        Learning About Being Active as an Older Adult  Why is being active important as you get older?     Being active is one of the best things you can do for your health. And it's never too late to start. Being active--or getting active, if you aren't already--has definite benefits. It can:  Give you more energy,  Keep your mind sharp.  Improve balance to reduce your risk of falls.  Help you manage chronic illness with fewer medicines.  No matter how old you are, how fit you are, or what health problems you have, there is a form of activity that will work for you. And the more physical activity you can do, the better your overall health will be.  What kinds of activity can help you stay healthy?  Being more active will make your daily activities easier. Physical activity includes planned exercise and things you do in daily life. There are four types of activity:  Aerobic.  Doing aerobic activity makes your heart and lungs strong.  Includes walking, dancing, and gardening.  Aim for at least 2½ hours spread throughout the week.  It improves your energy and can help you sleep better.  Muscle-strengthening.  This type of activity can help maintain muscle and strengthen bones.  Includes climbing stairs, using resistance bands, and lifting or carrying heavy loads.  Aim for at least twice a week.  It can help protect the knees and other joints.  Stretching.  Stretching gives you better

## 2025-05-12 NOTE — PROGRESS NOTES
Medicare Annual Wellness Visit    Clary Harris is here for Medicare AWV (Subsequent medicare wellness exam)    Assessment & Plan   Type 2 diabetes mellitus with other specified complication, without long-term current use of insulin (HCC)  Primary osteoarthritis involving multiple joints  Comments:  wean mobic to every other day  The following orders have not been finalized:  Orders:  -     meloxicam (MOBIC) 15 MG tablet  Essential hypertension  Major depressive disorder, recurrent, moderate (HCC)  Mixed hyperlipidemia  Chronic obstructive pulmonary disease, unspecified COPD type (HCC)  Hypothyroidism, unspecified type       No follow-ups on file.     Subjective       Patient's complete Health Risk Assessment and screening values have been reviewed and are found in Flowsheets. The following problems were reviewed today and where indicated follow up appointments were made and/or referrals ordered.    Positive Risk Factor Screenings with Interventions:              Inactivity:  On average, how many days per week do you engage in moderate to strenuous exercise (like a brisk walk)?: (Patient-Rptd) 1 day (!) Abnormal  On average, how many minutes do you engage in exercise at this level?: (Patient-Rptd) 30 min    Interventions:  See AVS for additional education material     Abnormal BMI (obese):  Body mass index is 31.28 kg/m². (!) Abnormal    Interventions:  See A/P for plan and any pertinent orders                           Objective   Vitals:    05/12/25 1138   BP: 120/76   Pulse: 69   Resp: 14   SpO2: 96%   Weight: 85.3 kg (188 lb)   Height: 1.651 m (5' 5\")      Body mass index is 31.28 kg/m².                    Allergies   Allergen Reactions    Hydrochlorothiazide     Sulfa Antibiotics Hives, Other (See Comments) and Itching     Prior to Visit Medications    Medication Sig Taking? Authorizing Provider   OZEMPIC, 1 MG/DOSE, 4 MG/3ML SOPN sc injection INJECT 1 MG UNDER THE SKIN EVERY 7 DAYS Yes Kandi Thompson,

## 2025-05-12 NOTE — TELEPHONE ENCOUNTER
Clary Harris is calling to request a refill on the following medication(s):    Medication Request:  Requested Prescriptions     Pending Prescriptions Disp Refills    semaglutide, 2 MG/DOSE, (OZEMPIC) 8 MG/3ML SOPN sc injection 3 mL 3     Sig: Inject 2 mg into the skin every 7 days       Last Visit Date (If Applicable):  5/12/2025    Next Visit Date:    9/10/2025

## 2025-05-12 NOTE — PROGRESS NOTES
[facility]  [facility] MEDICINE  [address]  [facility] [address]  Dept: [phone]     Date of Visit:  2025  Patient Name: Clary Harris   Patient :  1945     CHIEF COMPLAINT/HPI:     Chief Complaint   Patient presents with    Medicare AWV     Subsequent medicare wellness exam        HPI      Clary Harris, 79 y.o. presents today for a Medicare Annual Wellness Visit and a follow up of osteoarthritis, hypertension, depression, type II diabetes mellitus, COPD, and hypothyroidism. She has been well. She has not been hospitalized since her last appointment. She continues to report shortness of breath after activity but this issue is improving. She notes she was in [place] a few weeks ago and often had to stop to take breaks when walking. She denies wheezing. She uses Breztri occasionally because she reports this medication causes a dry cough. She will use this medication at night and she will experience coughing fits. She also reports hoarseness today. She does wash her mouth out after use. She sees Dr. Escalante in July.    The last time she was hospitalized her thyroid dosage was decreased. She has been experiences chills, dry skin, and fatigue.    She has been taking Ozempic 1mg weekly, she has not lost weight on this medication. She tolerates this dosage well. She reports instances of reflux if she eats later at night, but not routinely.  No N/V. She takes an over the counter laxative which keeps her bowel movements regular.     She stopped taking Lasix and her swelling remains well controlled. She takes Coreg, Atacand, and Norvasc daily for her blood pressure.     She takes trazodone nightly to help with sleep. She has been taking Mobic 15mg daily for her joint pain. She reports a degenerative disc disease so she intermittently experiences low back pain. She also has cervical disc disease. She also takes prilosec.    She sees dermatology

## 2025-05-22 ENCOUNTER — HOSPITAL ENCOUNTER (OUTPATIENT)
Age: 80
Setting detail: SPECIMEN
Discharge: HOME | End: 2025-05-22

## 2025-05-22 DIAGNOSIS — E11.69 TYPE 2 DIABETES MELLITUS WITH OTHER SPECIFIED COMPLICATION, WITHOUT LONG-TERM CURRENT USE OF INSULIN (HCC): ICD-10-CM

## 2025-05-22 DIAGNOSIS — E78.2 MIXED HYPERLIPIDEMIA: ICD-10-CM

## 2025-05-22 LAB
ALBUMIN SERPL-MCNC: 4 G/DL (ref 3.5–5.2)
ALBUMIN/GLOB SERPL: 1.5 {RATIO} (ref 1–2.5)
ALP SERPL-CCNC: 73 U/L (ref 35–104)
ALT SERPL-CCNC: 18 U/L (ref 10–35)
ANION GAP SERPL CALCULATED.3IONS-SCNC: 9 MMOL/L (ref 9–16)
AST SERPL-CCNC: 24 U/L (ref 10–35)
BASOPHILS # BLD: 0.09 K/UL (ref 0–0.2)
BASOPHILS NFR BLD: 1 % (ref 0–2)
BILIRUB SERPL-MCNC: 0.5 MG/DL (ref 0–1.2)
BUN SERPL-MCNC: 23 MG/DL (ref 8–23)
CALCIUM SERPL-MCNC: 9.9 MG/DL (ref 8.6–10.4)
CHLORIDE SERPL-SCNC: 105 MMOL/L (ref 98–107)
CHOLEST SERPL-MCNC: 99 MG/DL (ref 0–199)
CHOLESTEROL/HDL RATIO: 2.4
CO2 SERPL-SCNC: 28 MMOL/L (ref 20–31)
CREAT SERPL-MCNC: 1 MG/DL (ref 0.6–0.9)
CREAT UR-MCNC: 589 MG/DL (ref 28–217)
EOSINOPHIL # BLD: 0.19 K/UL (ref 0–0.44)
EOSINOPHILS RELATIVE PERCENT: 2 % (ref 1–4)
ERYTHROCYTE [DISTWIDTH] IN BLOOD BY AUTOMATED COUNT: 14.2 % (ref 11.8–14.4)
EST. AVERAGE GLUCOSE BLD GHB EST-MCNC: 114 MG/DL
GFR, ESTIMATED: 58 ML/MIN/1.73M2
GLUCOSE SERPL-MCNC: 91 MG/DL (ref 74–99)
HBA1C MFR BLD: 5.6 % (ref 4–6)
HCT VFR BLD AUTO: 42.8 % (ref 36.3–47.1)
HDLC SERPL-MCNC: 42 MG/DL
HGB BLD-MCNC: 12.9 G/DL (ref 11.9–15.1)
IMM GRANULOCYTES # BLD AUTO: 0.03 K/UL (ref 0–0.3)
IMM GRANULOCYTES NFR BLD: 0 %
LDLC SERPL CALC-MCNC: 33 MG/DL (ref 0–100)
LYMPHOCYTES NFR BLD: 2.08 K/UL (ref 1.1–3.7)
LYMPHOCYTES RELATIVE PERCENT: 22 % (ref 24–43)
MCH RBC QN AUTO: 26.8 PG (ref 25.2–33.5)
MCHC RBC AUTO-ENTMCNC: 30.1 G/DL (ref 28.4–34.8)
MCV RBC AUTO: 89 FL (ref 82.6–102.9)
MICROALBUMIN UR-MCNC: 162 MG/L (ref 0–20)
MICROALBUMIN/CREAT UR-RTO: 28 MCG/MG CREAT (ref 0–25)
MONOCYTES NFR BLD: 0.83 K/UL (ref 0.1–1.2)
MONOCYTES NFR BLD: 9 % (ref 3–12)
NEUTROPHILS NFR BLD: 66 % (ref 36–65)
NEUTS SEG NFR BLD: 6.19 K/UL (ref 1.5–8.1)
NRBC BLD-RTO: 0 PER 100 WBC
PLATELET # BLD AUTO: 289 K/UL (ref 138–453)
PMV BLD AUTO: 10.1 FL (ref 8.1–13.5)
POTASSIUM SERPL-SCNC: 4.8 MMOL/L (ref 3.7–5.3)
PROT SERPL-MCNC: 6.6 G/DL (ref 6.6–8.7)
RBC # BLD AUTO: 4.81 M/UL (ref 3.95–5.11)
SODIUM SERPL-SCNC: 142 MMOL/L (ref 136–145)
T4 FREE SERPL-MCNC: 1.2 NG/DL (ref 0.92–1.68)
TRIGL SERPL-MCNC: 121 MG/DL
TSH SERPL DL<=0.05 MIU/L-ACNC: 1.92 UIU/ML (ref 0.27–4.2)
VLDLC SERPL CALC-MCNC: 24 MG/DL (ref 1–30)
WBC OTHER # BLD: 9.4 K/UL (ref 3.5–11.3)

## 2025-05-23 ENCOUNTER — RESULTS FOLLOW-UP (OUTPATIENT)
Age: 80
End: 2025-05-23

## 2025-06-19 ENCOUNTER — HOSPITAL ENCOUNTER (OUTPATIENT)
Dept: MAMMOGRAPHY | Age: 80
Discharge: HOME | End: 2025-06-21
Attending: FAMILY MEDICINE
Payer: MEDICARE

## 2025-06-19 VITALS — WEIGHT: 188 LBS | BODY MASS INDEX: 31.28 KG/M2

## 2025-06-19 DIAGNOSIS — Z78.0 POSTMENOPAUSAL: ICD-10-CM

## 2025-06-19 DIAGNOSIS — Z12.31 ENCOUNTER FOR SCREENING MAMMOGRAM FOR MALIGNANT NEOPLASM OF BREAST: ICD-10-CM

## 2025-06-19 PROCEDURE — 77080 DXA BONE DENSITY AXIAL: CPT

## 2025-06-19 PROCEDURE — 77063 BREAST TOMOSYNTHESIS BI: CPT

## 2025-07-09 DIAGNOSIS — E11.69 TYPE 2 DIABETES MELLITUS WITH OTHER SPECIFIED COMPLICATION, WITHOUT LONG-TERM CURRENT USE OF INSULIN (HCC): ICD-10-CM

## 2025-07-09 RX ORDER — SEMAGLUTIDE 2.68 MG/ML
INJECTION, SOLUTION SUBCUTANEOUS
Qty: 9 ML | Refills: 3 | Status: SHIPPED | OUTPATIENT
Start: 2025-07-09

## 2025-07-09 NOTE — TELEPHONE ENCOUNTER
Clary Harris is calling to request a refill on the following medication(s):    Medication Request:  Requested Prescriptions     Pending Prescriptions Disp Refills    OZEMPIC, 2 MG/DOSE, 8 MG/3ML SOPN sc injection [Pharmacy Med Name: OZEMPIC  2MG SOLUTION PEN-INJECTOR  PEN DOSE] 9 mL 3     Sig: INJECT SUBCUTANEOUSLY 2 MG EVERY WEEK       Last Visit Date (If Applicable):  5/12/2025    Next Visit Date:    9/10/2025

## 2025-07-25 NOTE — PROGRESS NOTES
[facility] PULMONARY,CRITICAL CARE & SLEEP   Jose Ramonmarie Oh MD/Lopez ROJAS AGAISISP-BC, NP-C       Elisabet ROJAS NP-C      Bruno ROJAS NP-C                                  Pulmonary Critical Care Progress Note    Patient - Clary Harris   Age - 78 y.o.   - 1945  MRN - [id number]  [id number]  Date of Admission - 2024  9:06 PM    Consulting Service/Physician:       Primary Care Physician: Kandi Thompson DO    SUBJECTIVE:     Chief Complaint:   Chief Complaint   Patient presents with    Shortness of Breath    Chest Pain     Onset Wednesday    Chills   Pleuritic chest pain  Subjective:    Patient had pleuritic chest pain and progressive shortness of breath for approximately 1 week prior to presentation to the hospital.    She really did not have much in the way of cough or phlegm.    Patient was a previous 1 pack/day smoker from approximately age 19 until she was in her early 40s.  She has been labeled as having COPD based on PFT at [facility].  Saw Emmie for [facility] on CPAP but just stopped using it.  DME???    VITALS  /77   Pulse 93   Temp 98.8 °F (37.1 °C) (Oral)   Resp 20   Ht 1.651 m (5' 5\")   Wt 83.9 kg (185 lb)   SpO2 90%   BMI 30.79 kg/m²   Wt Readings from Last 3 Encounters:   24 83.9 kg (185 lb)   06/10/24 87.7 kg (193 lb 6.4 oz)   23 88.9 kg (196 lb)     I/O (24 Hours)  No intake or output data in the 24 hours ending 24 1021  Ventilator:   Settings  FiO2 : 21 %  Exam:   Physical Exam   Constitutional:  Alert and cooperative   Cardiovascular:  Regular rate and rhythm.  Normal heart tones.  No JVD.    Pulmonary/Chest: Diminished air exchange over the right hemithorax with diffuse bronchial breath sounds  Extremities: No edema or discoloration  Infusions:      sodium chloride       Meds:     Current Facility-Administered Medications:     albuterol (PROVENTIL) (2.5 MG/3ML) 0.083% nebulizer solution 2.5  Lvm for pt to give call back about his concerns

## 2025-08-15 ENCOUNTER — HOSPITAL ENCOUNTER (OUTPATIENT)
Dept: PULMONOLOGY | Age: 80
Discharge: HOME | End: 2025-08-15
Payer: MEDICARE

## 2025-08-15 LAB
DLCO %PRED: NORMAL
DLCO PRED: NORMAL
DLCO/VA %PRED: NORMAL
DLCO/VA PRED: NORMAL
DLCO/VA: NORMAL
DLCO: NORMAL
EXPIRATORY TIME: NORMAL
FEF 25-75% %PRED-PRE: NORMAL
FEF 25-75% PRED: NORMAL
FEF 25-75-PRE: NORMAL
FEV1 %PRED-PRE: NORMAL
FEV1 PRED: NORMAL
FEV1/FVC %PRED-PRE: NORMAL
FEV1/FVC PRED: NORMAL
FEV1/FVC: NORMAL
FEV1: NORMAL
FVC %PRED-PRE: NORMAL
FVC PRED: NORMAL
FVC: NORMAL
GAW %PRED: NORMAL
GAW PRED: NORMAL
GAW: NORMAL
IC PRE %PRED: NORMAL
IC PRED: NORMAL
IC: NORMAL
MVV %PRED-PRE: NORMAL
MVV PRED: NORMAL
MVV-PRE: NORMAL
PEF %PRED-PRE: NORMAL
PEF PRED: NORMAL
PEF-PRE: NORMAL
RAW %PRED: NORMAL
RAW PRED: NORMAL
RAW: NORMAL
RV PRE %PRED: NORMAL
RV PRED: NORMAL
RV: NORMAL
SVC %PRED: NORMAL
SVC PRED: NORMAL
SVC: NORMAL
TLC PRE %PRED: NORMAL
TLC PRED: NORMAL
TLC: NORMAL
VA %PRED: NORMAL
VA PRED: NORMAL
VA: NORMAL
VTG %PRED: NORMAL
VTG PRED: NORMAL
VTG: NORMAL

## 2025-08-15 PROCEDURE — 94664 DEMO&/EVAL PT USE INHALER: CPT

## 2025-08-15 PROCEDURE — 94060 EVALUATION OF WHEEZING: CPT
